# Patient Record
Sex: MALE | Race: WHITE | NOT HISPANIC OR LATINO | Employment: FULL TIME | URBAN - METROPOLITAN AREA
[De-identification: names, ages, dates, MRNs, and addresses within clinical notes are randomized per-mention and may not be internally consistent; named-entity substitution may affect disease eponyms.]

---

## 2017-02-22 DIAGNOSIS — E84.9 CF (CYSTIC FIBROSIS) (H): ICD-10-CM

## 2017-02-22 RX ORDER — LUMACAFTOR AND IVACAFTOR 200; 125 MG/1; MG/1
TABLET, FILM COATED ORAL
Qty: 336 TABLET | Refills: 0 | Status: SHIPPED | OUTPATIENT
Start: 2017-02-22 | End: 2017-04-19

## 2017-03-03 ENCOUNTER — TELEPHONE (OUTPATIENT)
Dept: PULMONOLOGY | Facility: CLINIC | Age: 52
End: 2017-03-03

## 2017-03-03 DIAGNOSIS — E84.9 CF (CYSTIC FIBROSIS) (H): Primary | ICD-10-CM

## 2017-03-03 RX ORDER — CIPROFLOXACIN 750 MG/1
750 TABLET, FILM COATED ORAL 2 TIMES DAILY
Qty: 42 TABLET | Refills: 0 | Status: SHIPPED | OUTPATIENT
Start: 2017-03-03 | End: 2017-03-24

## 2017-03-03 NOTE — TELEPHONE ENCOUNTER
[facility]  March 3, 2017    Dorene Mireles    CF Provider: Siobhan Munoz MD    Caller: Patient     Clinical information:  Michele Altamirano called to report that he will be traveling out of the country for work for the next five of six weeks. Wondering if he can take an antibiotic with in case of illness. Also has questions regarding possible side effects from [employer]. Notes that his blood pressure is elevated ~ 150/110. Also noted that in the afternoon his distance vision seems to be blurred.    Plan:   Discussed with Dr Munoz:  PCP to address elevated blood pressure as well as vision changes.  Call back with any new or worsening symptoms/concerns.    Caller verbalized understanding of plan and agrees with advice given.

## 2017-04-13 DIAGNOSIS — E84.9 CF (CYSTIC FIBROSIS) (H): Primary | ICD-10-CM

## 2017-04-19 ENCOUNTER — OFFICE VISIT (OUTPATIENT)
Dept: PULMONOLOGY | Facility: CLINIC | Age: 52
End: 2017-04-19
Attending: INTERNAL MEDICINE
Payer: COMMERCIAL

## 2017-04-19 ENCOUNTER — HOSPITAL ENCOUNTER (OUTPATIENT)
Dept: PHYSICAL THERAPY | Facility: CLINIC | Age: 52
Setting detail: THERAPIES SERIES
End: 2017-04-19
Attending: INTERNAL MEDICINE
Payer: COMMERCIAL

## 2017-04-19 ENCOUNTER — ALLIED HEALTH/NURSE VISIT (OUTPATIENT)
Dept: CARE COORDINATION | Facility: CLINIC | Age: 52
End: 2017-04-19

## 2017-04-19 DIAGNOSIS — K21.9 GASTROESOPHAGEAL REFLUX DISEASE WITHOUT ESOPHAGITIS: ICD-10-CM

## 2017-04-19 DIAGNOSIS — E84.0 CYSTIC FIBROSIS WITH PULMONARY MANIFESTATIONS (H): ICD-10-CM

## 2017-04-19 DIAGNOSIS — K86.89 PANCREATIC INSUFFICIENCY: ICD-10-CM

## 2017-04-19 DIAGNOSIS — E84.9 CYSTIC FIBROSIS (H): Primary | ICD-10-CM

## 2017-04-19 DIAGNOSIS — E84.9 CYSTIC FIBROSIS (H): ICD-10-CM

## 2017-04-19 DIAGNOSIS — Z13.9 RISK AND FUNCTIONAL ASSESSMENT: Primary | ICD-10-CM

## 2017-04-19 DIAGNOSIS — E55.9 VITAMIN D DEFICIENCY: ICD-10-CM

## 2017-04-19 DIAGNOSIS — E84.9 CF (CYSTIC FIBROSIS) (H): ICD-10-CM

## 2017-04-19 LAB
EXPTIME-PRE: 11.73 SEC
FEF2575-%PRED-PRE: 33 %
FEF2575-PRE: 1.16 L/SEC
FEF2575-PRED: 3.46 L/SEC
FEFMAX-%PRED-PRE: 79 %
FEFMAX-PRE: 7.57 L/SEC
FEFMAX-PRED: 9.56 L/SEC
FEV1-%PRED-PRE: 62 %
FEV1-PRE: 2.38 L
FEV1FEV6-PRE: 67 %
FEV1FEV6-PRED: 80 %
FEV1FVC-PRE: 64 %
FEV1FVC-PRED: 79 %
FIFMAX-PRE: 6.41 L/SEC
FVC-%PRED-PRE: 78 %
FVC-PRE: 3.75 L
FVC-PRED: 4.79 L

## 2017-04-19 PROCEDURE — 94375 RESPIRATORY FLOW VOLUME LOOP: CPT | Mod: ZF | Performed by: INTERNAL MEDICINE

## 2017-04-19 PROCEDURE — 99212 OFFICE O/P EST SF 10 MIN: CPT | Mod: ZF

## 2017-04-19 PROCEDURE — 40000185 ZZHC STATISTIC PT OUTPT VISIT

## 2017-04-19 RX ORDER — SODIUM CHLORIDE FOR INHALATION 7 %
4 VIAL, NEBULIZER (ML) INHALATION 2 TIMES DAILY
Qty: 720 ML | Refills: 3 | Status: SHIPPED | OUTPATIENT
Start: 2017-04-19 | End: 2017-12-22

## 2017-04-19 RX ORDER — AZITHROMYCIN 500 MG/1
500 TABLET, FILM COATED ORAL
Qty: 39 TABLET | Refills: 3 | Status: SHIPPED | OUTPATIENT
Start: 2017-04-19 | End: 2019-03-06

## 2017-04-19 ASSESSMENT — PAIN SCALES - GENERAL: PAINLEVEL: NO PAIN (0)

## 2017-04-19 NOTE — PROGRESS NOTES
[facility]  April 19, 2017         Assessment and Plan:   Michele Altamirano is a 51 year old male with cystic fibrosis.    1. CF lung disease with moderate obstruction: Michele has shown evidence of excellent stability in his pulmonary function as well as his pulmonary symptomatology.  He had stopped doing vest therapy after recent travel abroad and only recently restarted.  He did notice that his hypertonic saline caused his increased headaches.  We did discuss today stopping the hypertonic saline and switching to Pulmozyme 2.5 mg nebulized therapy once daily.  He will consider this as an option.    2.  Cystic fibrosis sinus disease.  Since last being seen, Michele has had sinus surgery.  He says although it helped him breathe better through his sinuses, he continued to have headaches following this.  Presently he has no concerning sinus symptoms.    3.  History of abnormal glucose tolerance.  Michele historically has shown evidence of hyperglycemia.  He currently is not on insulin therapy.  The plan is to follow blood sugars and respond to any symptom change.    4.  Abdominal bloating.  This has been a longstanding problem for Michele, but he did report after decreasing the amount of pancreatic enzymes that he is taking each day that this is much improved.  He reports he only went down by 1 or 2 with meals.  He had no change in stool with this.    5.  Fungal sinusitis.  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  He did his amphotericin nebulized therapy for 3 weeks' time and then discontinued because he did not find it useful.    6.  Psychosocial.  Michele continues to work full-time.  He reports that in general work is going well.  He is doing a fair amount of traveling for work.   7. Pancreatic Insufficiency:  The patient has no new symptoms consistent with worsening malabsorption.  The plan is to continue the present dose of pancreatic enzymes  and vitamin supplementation.    Siobhan Munoz MD MPH   of Medicine  Pulmonary, Allergy, Critical Care and Sleep Medicine      Interval History:     Michele reports that he continues to produce sputum that is yellow in color.  His cough frequency and sputum volume are unchanged.  His activity tolerance remains quite stable.  He tries to neb daily.          Review of Systems:     CONSTITUTIONAL: no fever, no chills, no change in weight, no change in energy, no change in appetite    INTEGUMENTARY/SKIN: no rash, no obvious new lesions    ENT/MOUTH: no sore throat, no new sinus pain, no new nasal drainage     RESPIRATORY: see interval history    CV: no chest pain, no palpitations, no peripheral edema, no orthopnea, no PND    GI: no nausea, no vomiting, no change in stools, no fatty stools, no GERD, no abdominal pain    : no dysuria, no urinary frequency, no incontinence    MUSCULOSKELETAL: no myalgias, no arthralgias    ENDOCRINE: no excessive thirst    NEURO:  continued headache, no numbness, no tingling    SLEEP: no issues    PSYCHIATRIC: mood stable          Past Medical and Surgical History:     Past Medical History:   Diagnosis Date     CAP (community acquired pneumonia)     2011     CF (cystic fibrosis) (H)     diagnosed 1969, malnutrition, staph empyema     Chronic knee pain      GERD (gastroesophageal reflux disease)     egd in the past     Pancreatic insufficiency      Past Surgical History:   Procedure Laterality Date     ENDOSCOPIC SINUS SURGERY      removal of mucocele behind eye, 1992     OPTICAL TRACKING SYSTEM ENDOSCOPIC SINUS SURGERY Bilateral 11/28/2016    Procedure: OPTICAL TRACKING SYSTEM ENDOSCOPIC SINUS SURGERY;  Surgeon: Harriett Cosby MD;  Location:  OR           Family History:     Family History   Problem Relation Age of Onset     Lipids Father      Cardiovascular Father      DIABETES Maternal Grandmother      type 2            Social History:     Social History      Social History     Marital status:      Spouse name: N/A     Number of children: 1     Years of education: N/A     Occupational History           Social History Main Topics     Smoking status: Never Smoker     Smokeless tobacco: Never Used     Alcohol use No     Drug use: No     Sexual activity: Yes     Partners: Female     Birth control/ protection: None     Other Topics Concern     Not on file     Social History Narrative    Nov 2015--Lives in [place] with his wife and 10 yo son.  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            Medications:     Current Outpatient Prescriptions   Medication     sodium chloride (OCEAN) 0.65 % nasal spray     pantoprazole (PROTONIX) 40 MG enteric coated tablet     ranitidine (ZANTAC) 150 MG tablet     albuterol (PROAIR HFA, PROVENTIL HFA, VENTOLIN HFA) 108 (90 BASE) MCG/ACT inhaler     Cholecalciferol (VITAMIN D) 2000 UNITS CAPS     multivitamin CF formula (AQUADEKS) chewable tablet     azithromycin (ZITHROMAX) 500 MG tablet     dornase alpha (PULMOZYME) 1 MG/ML neb solution     sodium chloride inhalant 7 % NEBU neb solution     ZENPEP 87857 UNITS CPEP per EC capsule     lumacaftor-ivacaftor (ORKAMBI) 200-125 MG tablet     No current facility-administered medications for this visit.             Physical Exam:   BP (P) 138/84 (BP Location: Right arm, Patient Position: Chair, Cuff Size: Adult Regular)  Pulse (P) 69  Resp (P) 16  Wt (P) 71.8 kg (158 lb 4.6 oz)  SpO2 (P) 98%  BMI (P) 22.71 kg/m2    Constitutional:   Awake, alert and in no apparent distress     Eyes:   nonicteric     ENT:   oral mucosa moist without lesions, normal tm bilaterally, bilateral mucosal edema     Neck:   Supple without supraclavicular or cervical lymphadenopathy     Lungs:   Good air flow.  No crackles. No rhonchi.  No wheezes.     Cardiovascular:   Normal S1 and S2.  RRR.  No murmur, gallop or rub.     Abdomen:   NABS,  soft, nontender, nondistended.  No HSM.     Musculoskeletal:   No edema, digital clubbing present     Neurologic:   Alert and conversant.     Skin:   Warm, dry.  No rash on limited exam.             Data:   All laboratory and imaging data reviewed.    Cystic Fibrosis Culture  Specimen Description   Date Value Ref Range Status   01/12/2016 Sputum  Final   01/12/2016 Sputum  Final   01/12/2016 Sputum  Final    Culture Micro   Date Value Ref Range Status   01/12/2016 (A)  Final    Normal tessa  Moderate growth Pseudomonas aeruginosa, mucoid strain  Heavy growth Pseudomonas aeruginosa     01/12/2016   Final    Culture negative for acid fast bacilli  Assayed at [facility] [address]     11/04/2015 (A)  Final    Light growth Serratia marcescens  Moderate growth Pseudomonas aeruginosa  Moderate growth Pseudomonas aeruginosa, mucoid strain  Plus normal tessa          Recent Results (from the past 168 hour(s))   General PFT Lab (Please always keep checked)    Collection Time: 04/19/17  7:45 AM   Result Value Ref Range    FVC-Pred 4.79 L    FVC-Pre 3.75 L    FVC-%Pred-Pre 78 %    FEV1-Pre 2.38 L    FEV1-%Pred-Pre 62 %    FEV1FVC-Pred 79 %    FEV1FVC-Pre 64 %    FEFMax-Pred 9.56 L/sec    FEFMax-Pre 7.57 L/sec    FEFMax-%Pred-Pre 79 %    FEF2575-Pred 3.46 L/sec    FEF2575-Pre 1.16 L/sec    KXL9890-%Pred-Pre 33 %    ExpTime-Pre 11.73 sec    FIFMax-Pre 6.41 L/sec    FEV1FEV6-Pred 80 %    FEV1FEV6-Pre 67 %       PFT: Moderate obstructive lung disease.  When compared to , the FEV1 and FVC have 10/18/2017.  The decrease in FVC may represent air trapping v. restrictive physiology.  Lung volumes would be necessary to determine.

## 2017-04-19 NOTE — MR AVS SNAPSHOT
After Visit Summary   4/19/2017    Michele Altamirano    MRN: [id number]           Patient Information     Date Of Birth          1965        Visit Information        Provider Department      4/19/2017  8:00 AM Regina Jose PT [facility]         Follow-ups after your visit        Your next 10 appointments already scheduled     Jul 19, 2017  8:00 AM CDT   CF LOOP with UC PFL CF   [facility] Pulmonary Function Testing ([facility])    [address] [place] [id number] [phone]            Jul 19, 2017  8:30 AM CDT   (Arrive by 8:15 AM)   RETURN CYSTIC FIBROSIS VISIT with Siobhan Munoz MD   [facility] ([facility])    [address] [place] [id number] [phone]              Future tests that were ordered for you today     Open Future Orders        Priority Expected Expires Ordered    Cystic Fibrosis Culture Aerob Bacterial Routine  4/19/2018 4/19/2017            Who to contact     If you have questions or need follow up information about today's clinic visit or your schedule please contact [facility] directly at [phone].  Normal or non-critical lab and imaging results will be communicated to you by MyChart, letter or phone within 4 business days after the clinic has received the results. If you do not hear from us within 7 days, please contact the clinic through [employer] or phone. If you have a critical or abnormal lab result, we will notify you by phone as soon as possible.  Submit refill requests through [employer] or call your pharmacy and they will forward the refill request to us. Please allow 3 business days for your refill to be completed.          Additional Information About Your Visit        [employer] Information     [employer] gives you secure access to  your electronic health record. If you see a primary care provider, you can also send messages to your care team and make appointments. If you have questions, please call your primary care clinic.  If you do not have a primary care provider, please call [phone] and they will assist you.        Care EveryWhere ID     This is your Care EveryWhere ID. This could be used by other organizations to access your [place] medical records  [id number]         Blood Pressure from Last 3 Encounters:   04/19/17 (P) 138/84   12/22/16 146/71   11/28/16 131/85    Weight from Last 3 Encounters:   04/19/17 (P) 71.8 kg (158 lb 4.6 oz)   11/28/16 70.3 kg (155 lb)   10/18/16 73.1 kg (161 lb 2.5 oz)              Today, you had the following     No orders found for display         Today's Medication Changes      Start taking these medicines.        Dose/Directions    dornase alpha 1 MG/ML neb solution   Commonly known as:  PULMOZYME   Used for:  Cystic fibrosis (H)        Dose:  2.5 mg   Inhale 2.5 mg into the lungs daily   Quantity:  75 mL   Refills:  11         These medicines have changed or have updated prescriptions.        Dose/Directions    lumacaftor-ivacaftor 200-125 MG tablet   Commonly known as:  ORKAMBI   This may have changed:  See the new instructions.   Used for:  CF (cystic fibrosis) (H)        TAKE 2 TABLETS BY MOUTH EVERY 12 HOURS WITH FAT CONTAINING FOOD. STOREAT ROOM TEMPERATURE.   Quantity:  336 tablet   Refills:  0            Where to get your medicines      These medications were sent to [facility] - [place], [place] - [address]  [address], [facility] [address]     Phone:  [phone]     azithromycin 500 MG tablet    dornase alpha 1 MG/ML neb solution    sodium chloride inhalant 7 % Nebu neb solution    ZENPEP 44160 UNITS Cpep         Call your pharmacy to confirm that your medication is ready for pickup. It may take up to 24 hours for them to receive the  prescription. If the prescription is not ready within 3 business days, please contact your clinic or your provider.     We will let you know when these medications are ready. If you don't hear back within 3 business days, please contact us.     lumacaftor-ivacaftor 200-125 MG tablet                Primary Care Provider Office Phone # Fax #    Dorene Mireles [phone]       [facility] [address]  [place] [address]        Thank you!     Thank you for choosing [facility] PHYSICAL [facility] OUTPATIENT CYSTIC FIBROSIS CLINIC  for your care. Our goal is always to provide you with excellent care. Hearing back from our patients is one way we can continue to improve our services. Please take a few minutes to complete the written survey that you may receive in the mail after your visit with us. Thank you!             Your Updated Medication List - Protect others around you: Learn how to safely use, store and throw away your medicines at www.disposemymeds.org.      ASK your doctor about these medications        Brand Name Dispense Instructions for use    albuterol 108 (90 BASE) MCG/ACT Inhaler    PROAIR HFA/PROVENTIL HFA/VENTOLIN HFA    3 Inhaler    Inhale 2 puffs into the lungs every 4 hours as needed for shortness of breath / dyspnea or wheezing       azithromycin 500 MG tablet    ZITHROMAX    39 tablet    Take 1 tablet (500 mg) by mouth Every Mon, Wed, Fri Morning       dornase alpha 1 MG/ML neb solution    PULMOZYME    75 mL    Inhale 2.5 mg into the lungs daily       lumacaftor-ivacaftor 200-125 MG tablet    ORKAMBI    336 tablet    TAKE 2 TABLETS BY MOUTH EVERY 12 HOURS WITH FAT CONTAINING FOOD. STOREAT ROOM TEMPERATURE.       multivitamin CF formula chewable tablet     60 tablet    Take 2 tablets by mouth daily       pantoprazole 40 MG EC tablet    PROTONIX    180 tablet    Take 1 tablet (40 mg) by mouth 2 times daily       ranitidine 150 MG tablet    ZANTAC    60 tablet    Take 1 tablet (150 mg)  by mouth 2 times daily       sodium chloride 0.65 % nasal spray    OCEAN    88 mL    Spray 2 sprays into both nostrils every 2 hours (while awake)       sodium chloride inhalant 7 % Nebu neb solution     720 mL    Take 4 mLs by nebulization 2 times daily       vitamin D 2000 UNITS Caps     60 capsule    Take 2 capsules by mouth daily       ZENPEP 07821 UNITS Cpep   Generic drug:  amylase-lipase-protease     3240 capsule    Take 8 with meals and 4 with snacks. (3 meals and 3 snacks per day)

## 2017-04-19 NOTE — LETTER
4/19/2017       RE: Michele Altamirano  [address] [address]     Dear Colleague,    Thank you for referring your patient, Michele Altamirano, to the [facility] at [facility]. Please see a copy of my visit note below.    [facility]  April 19, 2017         Assessment and Plan:   Michele Altamirano is a 51 year old male with cystic fibrosis.    1. CF lung disease with moderate obstruction: Michele has shown evidence of excellent stability in his pulmonary function as well as his pulmonary symptomatology.  He had stopped doing vest therapy after recent travel abroad and only recently restarted.  He did notice that his hypertonic saline caused his increased headaches.  We did discuss today stopping the hypertonic saline and switching to Pulmozyme 2.5 mg nebulized therapy once daily.  He will consider this as an option.    2.  Cystic fibrosis sinus disease.  Since last being seen, Michele has had sinus surgery.  He says although it helped him breathe better through his sinuses, he continued to have headaches following this.  Presently he has no concerning sinus symptoms.    3.  History of abnormal glucose tolerance.  Michele historically has shown evidence of hyperglycemia.  He currently is not on insulin therapy.  The plan is to follow blood sugars and respond to any symptom change.    4.  Abdominal bloating.  This has been a longstanding problem for Michele, but he did report after decreasing the amount of pancreatic enzymes that he is taking each day that this is much improved.  He reports he only went down by 1 or 2 with meals.  He had no change in stool with this.    5.  Fungal sinusitis.  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  He did his amphotericin nebulized therapy for 3 weeks' time and then discontinued because he did not find it useful.    6.  Psychosocial.  Michele continues to  work full-time.  He reports that in general work is going well.  He is doing a fair amount of traveling for work.   7. Pancreatic Insufficiency:  The patient has no new symptoms consistent with worsening malabsorption.  The plan is to continue the present dose of pancreatic enzymes and vitamin supplementation.    Siobhan Munoz MD MPH   of Medicine  Pulmonary, Allergy, Critical Care and Sleep Medicine      Interval History:     Michele reports that he continues to produce sputum that is yellow in color.  His cough frequency and sputum volume are unchanged.  His activity tolerance remains quite stable.  He tries to neb daily.          Review of Systems:     CONSTITUTIONAL: no fever, no chills, no change in weight, no change in energy, no change in appetite    INTEGUMENTARY/SKIN: no rash, no obvious new lesions    ENT/MOUTH: no sore throat, no new sinus pain, no new nasal drainage     RESPIRATORY: see interval history    CV: no chest pain, no palpitations, no peripheral edema, no orthopnea, no PND    GI: no nausea, no vomiting, no change in stools, no fatty stools, no GERD, no abdominal pain    : no dysuria, no urinary frequency, no incontinence    MUSCULOSKELETAL: no myalgias, no arthralgias    ENDOCRINE: no excessive thirst    NEURO:  continued headache, no numbness, no tingling    SLEEP: no issues    PSYCHIATRIC: mood stable          Past Medical and Surgical History:     Past Medical History:   Diagnosis Date     CAP (community acquired pneumonia)     2011     CF (cystic fibrosis) (H)     diagnosed 1969, malnutrition, staph empyema     Chronic knee pain      GERD (gastroesophageal reflux disease)     egd in the past     Pancreatic insufficiency      Past Surgical History:   Procedure Laterality Date     ENDOSCOPIC SINUS SURGERY      removal of mucocele behind eye, 1992     OPTICAL TRACKING SYSTEM ENDOSCOPIC SINUS SURGERY Bilateral 11/28/2016    Procedure: OPTICAL TRACKING SYSTEM ENDOSCOPIC  SINUS SURGERY;  Surgeon: Harriett Cosby MD;  Location:  OR           Family History:     Family History   Problem Relation Age of Onset     Lipids Father      Cardiovascular Father      DIABETES Maternal Grandmother      type 2            Social History:     Social History     Social History     Marital status:      Spouse name: N/A     Number of children: 1     Years of education: N/A     Occupational History           Social History Main Topics     Smoking status: Never Smoker     Smokeless tobacco: Never Used     Alcohol use No     Drug use: No     Sexual activity: Yes     Partners: Female     Birth control/ protection: None     Other Topics Concern     Not on file     Social History Narrative    Nov 2015--Lives in [place] with his wife and 10 yo son.  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            Medications:     Current Outpatient Prescriptions   Medication     sodium chloride (OCEAN) 0.65 % nasal spray     pantoprazole (PROTONIX) 40 MG enteric coated tablet     ranitidine (ZANTAC) 150 MG tablet     albuterol (PROAIR HFA, PROVENTIL HFA, VENTOLIN HFA) 108 (90 BASE) MCG/ACT inhaler     Cholecalciferol (VITAMIN D) 2000 UNITS CAPS     multivitamin CF formula (AQUADEKS) chewable tablet     azithromycin (ZITHROMAX) 500 MG tablet     dornase alpha (PULMOZYME) 1 MG/ML neb solution     sodium chloride inhalant 7 % NEBU neb solution     ZENPEP 96656 UNITS CPEP per EC capsule     lumacaftor-ivacaftor (ORKAMBI) 200-125 MG tablet     No current facility-administered medications for this visit.             Physical Exam:   BP (P) 138/84 (BP Location: Right arm, Patient Position: Chair, Cuff Size: Adult Regular)  Pulse (P) 69  Resp (P) 16  Wt (P) 71.8 kg (158 lb 4.6 oz)  SpO2 (P) 98%  BMI (P) 22.71 kg/m2    Constitutional:   Awake, alert and in no apparent distress     Eyes:   nonicteric     ENT:   oral mucosa moist without lesions,  normal tm bilaterally, bilateral mucosal edema     Neck:   Supple without supraclavicular or cervical lymphadenopathy     Lungs:   Good air flow.  No crackles. No rhonchi.  No wheezes.     Cardiovascular:   Normal S1 and S2.  RRR.  No murmur, gallop or rub.     Abdomen:   NABS, soft, nontender, nondistended.  No HSM.     Musculoskeletal:   No edema, digital clubbing present     Neurologic:   Alert and conversant.     Skin:   Warm, dry.  No rash on limited exam.             Data:   All laboratory and imaging data reviewed.    Cystic Fibrosis Culture  Specimen Description   Date Value Ref Range Status   01/12/2016 Sputum  Final   01/12/2016 Sputum  Final   01/12/2016 Sputum  Final    Culture Micro   Date Value Ref Range Status   01/12/2016 (A)  Final    Normal tessa  Moderate growth Pseudomonas aeruginosa, mucoid strain  Heavy growth Pseudomonas aeruginosa     01/12/2016   Final    Culture negative for acid fast bacilli  Assayed at [email or web address] [address]     11/04/2015 (A)  Final    Light growth Serratia marcescens  Moderate growth Pseudomonas aeruginosa  Moderate growth Pseudomonas aeruginosa, mucoid strain  Plus normal tessa          Recent Results (from the past 168 hour(s))   General PFT Lab (Please always keep checked)    Collection Time: 04/19/17  7:45 AM   Result Value Ref Range    FVC-Pred 4.79 L    FVC-Pre 3.75 L    FVC-%Pred-Pre 78 %    FEV1-Pre 2.38 L    FEV1-%Pred-Pre 62 %    FEV1FVC-Pred 79 %    FEV1FVC-Pre 64 %    FEFMax-Pred 9.56 L/sec    FEFMax-Pre 7.57 L/sec    FEFMax-%Pred-Pre 79 %    FEF2575-Pred 3.46 L/sec    FEF2575-Pre 1.16 L/sec    MVQ7607-%Pred-Pre 33 %    ExpTime-Pre 11.73 sec    FIFMax-Pre 6.41 L/sec    FEV1FEV6-Pred 80 %    FEV1FEV6-Pre 67 %       PFT: Moderate obstructive lung disease.  When compared to , the FEV1 and FVC have 10/18/2017.  The decrease in FVC may represent air trapping v. restrictive physiology.  Lung volumes would be necessary to  determine.      Again, thank you for allowing me to participate in the care of your patient.      Sincerely,    Siobhan Munoz MD

## 2017-04-19 NOTE — PATIENT INSTRUCTIONS
Cystic Fibrosis Self-Care Plan       MRN: [id number]   Clinic Date: April 19, 2017   Patient: Michele Altamirano     Annual Studies:   IGG   Date Value Ref Range Status   09/24/2015  695 - 1620 mg/dL Final    Canceled, Test credited   Canceled by station  As requested by nurse 1020,9/24/2015.  MR       Insulin   Date Value Ref Range Status   09/24/2015 29 mU/L Final     Comment:     Reference Range:  0-20     There are no preventive care reminders to display for this patient.      Pulmonary Function Tests  FEV1: amount of air you can blow out in 1 second  FVC: total amount of air you can take in and blow out    Your Goals:         PFT Latest Ref Rng & Units 4/19/2017   FVC L 3.75   FEV1 L 2.38   FVC% % 78   FEV1% % 62          Airway Clearance: The Most Important Way to Keep Your Lungs Healthy  Vest Settings:    Hill-Rom Frequencies: 8, 9, 10 Pressure 10 Then, Frequencies 18, 19, 20 Pressure 6      RespirTech: Quick Start with Pressure of     Do each frequency for 5 minutes; Deflate vest after each frequency & cough 3 times before beginning the next setting.    Vest and Neb Therapy should be done 1-2 times/day.    Good Nutrition Can Improve Lung Function and Overall Health     Take ALL of your vitamins with food     Take 1/2 of your enzymes before EVERY meal/snack and the other 1/2 mid-meal/snack    Wt Readings from Last 3 Encounters:   04/19/17 (P) 71.8 kg (158 lb 4.6 oz)   11/28/16 70.3 kg (155 lb)   10/18/16 73.1 kg (161 lb 2.5 oz)       Body mass index is 22.71 kg/(m^2) (pended).         National CF Foundation Recommendations for BMI in CF Adults: Women: at least 22 Men: at least 23        Controlling Blood Sugars Helps Prevent Lung Infections & Improves Nutrition  Test blood sugar:     In the morning before eating (goal is )     2 hours after a meal (goal is less than 150)     When pre-meal glucose is greater than 150 add correction     At bedtime (if less than 100 eat a snack with 15 grams of  carbohydrates  Last A1C Results:   Hemoglobin A1C   Date Value Ref Range Status   10/18/2016 6.3 (H) 4.3 - 6.0 % Final         If diabetic, measure A1C every 6 months. Goal: Under 7%    Staying Healthy    Research:  If you are interested in learning about research opportunities or have questions, please contact Jessenia Dalton at [phone] or [email or web address].      CF Foundation:  Compass is a personalized resource service to help you with the insurance, financial, legal and other issues you are facing.  It's free, confidential and available to anyone with CF.  Ask your  for more information or contact Compass directly at 092-COMPASS ([phone]) or compass@cff.org, or learn more at cff.org/compass.          RECOMMENDATIONS: Pulmozyme once daily.  Hold the hypertonic saline if doing the pulmozyme.  Will renew your scripts.    YOUR GOAL:  Enjoy the spring!      CF Nurse Line:  Ramirez Marlow: [phone]   Alyse Cheek, RT: [phone]     Willa Romero: [phone] or Alondra Vitale, Dieticians: [phone]   Priscila Norman, Diabetes Nurse: [phone]      Angela Guerrero: [phone] or Ayala Andino [phone], Social Workers   [email or web address]

## 2017-04-19 NOTE — NURSING NOTE
Chief Complaint   Patient presents with     Cystic Fibrosis     Patient is being seen for CF follow up      Lexi Damon CMA at 8:12 AM on 4/19/2017

## 2017-04-19 NOTE — MR AVS SNAPSHOT
After Visit Summary   4/19/2017    Michele Altamirano    MRN: [id number]           Patient Information     Date Of Birth          1965        Visit Information        Provider Department      4/19/2017 7:50 AM Siobhan Munoz MD [facility]        Today's Diagnoses     Cystic fibrosis (H)    -  1      Care Instructions    Cystic Fibrosis Self-Care Plan       MRN: [id number]   Clinic Date: April 19, 2017   Patient: Michele Altamirano     Annual Studies:   IGG   Date Value Ref Range Status   09/24/2015  695 - 1620 mg/dL Final    Canceled, Test credited   Canceled by station  As requested by nurse 1020,9/24/2015.  MR       Insulin   Date Value Ref Range Status   09/24/2015 29 mU/L Final     Comment:     Reference Range:  0-20     There are no preventive care reminders to display for this patient.      Pulmonary Function Tests  FEV1: amount of air you can blow out in 1 second  FVC: total amount of air you can take in and blow out    Your Goals:         PFT Latest Ref Rng & Units 4/19/2017   FVC L 3.75   FEV1 L 2.38   FVC% % 78   FEV1% % 62          Airway Clearance: The Most Important Way to Keep Your Lungs Healthy  Vest Settings:    Hill-Rom Frequencies: 8, 9, 10 Pressure 10 Then, Frequencies 18, 19, 20 Pressure 6      RespirTech: Quick Start with Pressure of     Do each frequency for 5 minutes; Deflate vest after each frequency & cough 3 times before beginning the next setting.    Vest and Neb Therapy should be done 1-2 times/day.    Good Nutrition Can Improve Lung Function and Overall Health     Take ALL of your vitamins with food     Take 1/2 of your enzymes before EVERY meal/snack and the other 1/2 mid-meal/snack    Wt Readings from Last 3 Encounters:   04/19/17 (P) 71.8 kg (158 lb 4.6 oz)   11/28/16 70.3 kg (155 lb)   10/18/16 73.1 kg (161 lb 2.5 oz)       Body mass index is 22.71 kg/(m^2) (pended).         National CF Foundation Recommendations for BMI in CF  Adults: Women: at least 22 Men: at least 23        Controlling Blood Sugars Helps Prevent Lung Infections & Improves Nutrition  Test blood sugar:     In the morning before eating (goal is )     2 hours after a meal (goal is less than 150)     When pre-meal glucose is greater than 150 add correction     At bedtime (if less than 100 eat a snack with 15 grams of carbohydrates  Last A1C Results:   Hemoglobin A1C   Date Value Ref Range Status   10/18/2016 6.3 (H) 4.3 - 6.0 % Final         If diabetic, measure A1C every 6 months. Goal: Under 7%    Staying Healthy    Research:  If you are interested in learning about research opportunities or have questions, please contact Jessenia Dalton at [phone] or [email or web address].      CF Foundation:  Compass is a personalized resource service to help you with the insurance, financial, legal and other issues you are facing.  It's free, confidential and available to anyone with CF.  Ask your  for more information or contact Compass directly at 131-[facility] (001-8624) or compass@cff.org, or learn more at cff.org/compass.          RECOMMENDATIONS: Pulmozyme once daily.  Hold the hypertonic saline if doing the pulmozyme.  Will renew your scripts.    YOUR GOAL:  Enjoy the spring!      CF Nurse Line:  Ramirez Marlow: [phone]   Alyse Cheek, RT: [phone]     Willa Romero: [phone] or Alondra Vitale, Dieticians: [phone]   Priscila Normna, Diabetes Nurse: [phone]      Angela Guerrero: [phone] or Ayala Andino [phone], Social Workers   [email or web address]                 Follow-ups after your visit        Follow-up notes from your care team     Return in about 3 months (around 7/19/2017).      Your next 10 appointments already scheduled     Jul 19, 2017  8:00 AM CDT   CF LOOP with [facility] PFL [facility] Pulmonary Function Testing ([facility])    [address]  [id number] [phone]            Jul 19, 2017  8:30 AM CDT   (Arrive by 8:15 AM)   RETURN CYSTIC FIBROSIS VISIT with Siobhan Munoz MD   [facility] ([facility])    [address] [facility]  [address] [facility] [id number] [phone]              Future tests that were ordered for you today     Open Future Orders        Priority Expected Expires Ordered    Cystic Fibrosis Culture Aerob Bacterial Routine  4/19/2018 4/19/2017            Who to contact     If you have questions or need follow up information about today's clinic visit or your schedule please contact [facility] directly at [phone].  Normal or non-critical lab and imaging results will be communicated to you by MyChart, letter or phone within 4 business days after the clinic has received the results. If you do not hear from us within 7 days, please contact the clinic through Bufferhart or phone. If you have a critical or abnormal lab result, we will notify you by phone as soon as possible.  Submit refill requests through [email or web address] or call your pharmacy and they will forward the refill request to us. Please allow 3 business days for your refill to be completed.          Additional Information About Your Visit        [email or web address] Information     [email or web address] gives you secure access to your electronic health record. If you see a primary care provider, you can also send messages to your care team and make appointments. If you have questions, please call your primary care clinic.  If you do not have a primary care provider, please call [phone] and they will assist you.        Care EveryWhere ID     This is your Care EveryWhere ID. This could be used by other organizations to access your [facility] medical records  [id number]         Blood Pressure from Last 3 Encounters:   04/19/17 (P) 138/84   12/22/16 146/71   11/28/16 131/85    Weight from Last 3  Encounters:   04/19/17 (P) 71.8 kg (158 lb 4.6 oz)   11/28/16 70.3 kg (155 lb)   10/18/16 73.1 kg (161 lb 2.5 oz)              We Performed the Following     RESPIRATORY FLOW VOLUME LOOP          Today's Medication Changes          These changes are accurate as of: 4/19/17 10:31 AM.  If you have any questions, ask your nurse or doctor.               Start taking these medicines.        Dose/Directions    dornase alpha 1 MG/ML neb solution   Commonly known as:  PULMOZYME   Used for:  Cystic fibrosis (H)   Started by:  Lora La RN        Dose:  2.5 mg   Inhale 2.5 mg into the lungs daily   Quantity:  75 mL   Refills:  11         These medicines have changed or have updated prescriptions.        Dose/Directions    albuterol 108 (90 BASE) MCG/ACT Inhaler   Commonly known as:  PROAIR HFA/PROVENTIL HFA/VENTOLIN HFA   This may have changed:    - when to take this  - additional instructions   Used for:  CF (cystic fibrosis) (H), Bronchiectasis without acute exacerbation (H)        Dose:  2 puff   Inhale 2 puffs into the lungs every 4 hours as needed for shortness of breath / dyspnea or wheezing   Quantity:  3 Inhaler   Refills:  3       lumacaftor-ivacaftor 200-125 MG tablet   Commonly known as:  ORKAMBI   This may have changed:  See the new instructions.   Used for:  CF (cystic fibrosis) (H)   Changed by:  Lora La, RN        TAKE 2 TABLETS BY MOUTH EVERY 12 HOURS WITH FAT CONTAINING FOOD. STOREAT ROOM TEMPERATURE.   Quantity:  336 tablet   Refills:  0            Where to get your medicines      These medications were sent to [facility] - [place], [place] - [address]  [address], [facility] [address]     Phone:  [phone]     azithromycin 500 MG tablet    dornase alpha 1 MG/ML neb solution    sodium chloride inhalant 7 % Nebu neb solution    ZENPEP 58816 UNITS Cpep         Call your pharmacy to confirm that your medication is ready for pickup. It may take up to 24  hours for them to receive the prescription. If the prescription is not ready within 3 business days, please contact your clinic or your provider.     We will let you know when these medications are ready. If you don't hear back within 3 business days, please contact us.     lumacaftor-ivacaftor 200-125 MG tablet                Primary Care Provider Office Phone # Fax #    Dorene Mireles [phone]       [facility] [address] [facility] [address]        Thank you!     Thank you for choosing [facility]  for your care. Our goal is always to provide you with excellent care. Hearing back from our patients is one way we can continue to improve our services. Please take a few minutes to complete the written survey that you may receive in the mail after your visit with us. Thank you!             Your Updated Medication List - Protect others around you: Learn how to safely use, store and throw away your medicines at www.disposemymeds.org.          This list is accurate as of: 4/19/17 10:31 AM.  Always use your most recent med list.                   Brand Name Dispense Instructions for use    albuterol 108 (90 BASE) MCG/ACT Inhaler    PROAIR HFA/PROVENTIL HFA/VENTOLIN HFA    3 Inhaler    Inhale 2 puffs into the lungs every 4 hours as needed for shortness of breath / dyspnea or wheezing       azithromycin 500 MG tablet    ZITHROMAX    39 tablet    Take 1 tablet (500 mg) by mouth Every Mon, Wed, Fri Morning       dornase alpha 1 MG/ML neb solution    PULMOZYME    75 mL    Inhale 2.5 mg into the lungs daily       lumacaftor-ivacaftor 200-125 MG tablet    ORKAMBI    336 tablet    TAKE 2 TABLETS BY MOUTH EVERY 12 HOURS WITH FAT CONTAINING FOOD. STOREAT ROOM TEMPERATURE.       multivitamin CF formula chewable tablet     60 tablet    Take 2 tablets by mouth daily       pantoprazole 40 MG EC tablet    PROTONIX    180 tablet    Take 1 tablet (40 mg) by mouth 2 times daily        ranitidine 150 MG tablet    ZANTAC    60 tablet    Take 1 tablet (150 mg) by mouth 2 times daily       sodium chloride 0.65 % nasal spray    OCEAN    88 mL    Spray 2 sprays into both nostrils every 2 hours (while awake)       sodium chloride inhalant 7 % Nebu neb solution     720 mL    Take 4 mLs by nebulization 2 times daily       vitamin D 2000 UNITS Caps     60 capsule    Take 2 capsules by mouth daily       ZENPEP 95383 UNITS Cpep   Generic drug:  amylase-lipase-protease     3240 capsule    Take 8 with meals and 4 with snacks. (3 meals and 3 snacks per day)

## 2017-04-19 NOTE — MR AVS SNAPSHOT
After Visit Summary   4/19/2017    Michele Altamirano    MRN: [id number]           Patient Information     Date Of Birth          1965        Visit Information        Provider Department      4/19/2017 11:21 AM Angela Guerrero MSW  CASE MANAGEMENT        Today's Diagnoses     Risk and functional assessment    -  1       Follow-ups after your visit        Your next 10 appointments already scheduled     Jul 19, 2017  8:00 AM CDT   CF LOOP with UC PFL CF   [facility] Pulmonary Function Testing ([place] [facility])    [address] [place] [id number] [phone]            Jul 19, 2017  8:30 AM CDT   (Arrive by 8:15 AM)   RETURN CYSTIC FIBROSIS VISIT with Siobhan Munoz MD   [facility] ([place] [facility])    [address] [place] [id number] [phone]              Future tests that were ordered for you today     Open Future Orders        Priority Expected Expires Ordered    Cystic Fibrosis Culture Aerob Bacterial Routine  4/19/2018 4/19/2017            Who to contact     If you have questions or need follow up information about today's clinic visit or your schedule please contact  CASE MANAGEMENT directly at [phone].  Normal or non-critical lab and imaging results will be communicated to you by [employer].hart, letter or phone within 4 business days after the clinic has received the results. If you do not hear from us within 7 days, please contact the clinic through [employer].hart or phone. If you have a critical or abnormal lab result, we will notify you by phone as soon as possible.  Submit refill requests through Handmark or call your pharmacy and they will forward the refill request to us. Please allow 3 business days for your refill to be completed.          Additional Information About Your Visit        [employer] Information     Handmark gives you secure access to your  electronic health record. If you see a primary care provider, you can also send messages to your care team and make appointments. If you have questions, please call your primary care clinic.  If you do not have a primary care provider, please call [phone] and they will assist you.        Care EveryWhere ID     This is your Care EveryWhere ID. This could be used by other organizations to access your [facility] medical records  [id number]         Blood Pressure from Last 3 Encounters:   04/19/17 (P) 138/84   12/22/16 146/71   11/28/16 131/85    Weight from Last 3 Encounters:   04/19/17 (P) 71.8 kg (158 lb 4.6 oz)   11/28/16 70.3 kg (155 lb)   10/18/16 73.1 kg (161 lb 2.5 oz)              Today, you had the following     No orders found for display         Today's Medication Changes          These changes are accurate as of: 4/19/17 11:59 PM.  If you have any questions, ask your nurse or doctor.               Start taking these medicines.        Dose/Directions    dornase alpha 1 MG/ML neb solution   Commonly known as:  PULMOZYME   Used for:  Cystic fibrosis (H)   Started by:  Lora La RN        Dose:  2.5 mg   Inhale 2.5 mg into the lungs daily   Quantity:  75 mL   Refills:  11         These medicines have changed or have updated prescriptions.        Dose/Directions    albuterol 108 (90 BASE) MCG/ACT Inhaler   Commonly known as:  PROAIR HFA/PROVENTIL HFA/VENTOLIN HFA   This may have changed:    - when to take this  - additional instructions   Used for:  CF (cystic fibrosis) (H), Bronchiectasis without acute exacerbation (H)        Dose:  2 puff   Inhale 2 puffs into the lungs every 4 hours as needed for shortness of breath / dyspnea or wheezing   Quantity:  3 Inhaler   Refills:  3       lumacaftor-ivacaftor 200-125 MG tablet   Commonly known as:  ORKAMBI   This may have changed:  See the new instructions.   Used for:  CF (cystic fibrosis) (H)   Changed by:  Lora La, RN        TAKE 2 TABLETS BY  MOUTH EVERY 12 HOURS WITH FAT CONTAINING FOOD. STOREAT ROOM TEMPERATURE.   Quantity:  336 tablet   Refills:  0            Where to get your medicines      These medications were sent to [facility] - [place], [place] - [address]  [address], [facility] [address]     Phone:  [phone]     azithromycin 500 MG tablet    dornase alpha 1 MG/ML neb solution    lumacaftor-ivacaftor 200-125 MG tablet    sodium chloride inhalant 7 % Nebu neb solution    ZENPEP 50195 UNITS Cpep                Primary Care Provider Office Phone # Fax #    Dorene Mireles [phone]       [facility] [address]  [facility] [address]        Thank you!     Thank you for choosing [facility] CASE MANAGEMENT  for your care. Our goal is always to provide you with excellent care. Hearing back from our patients is one way we can continue to improve our services. Please take a few minutes to complete the written survey that you may receive in the mail after your visit with us. Thank you!             Your Updated Medication List - Protect others around you: Learn how to safely use, store and throw away your medicines at www.disposemymeds.org.          This list is accurate as of: 4/19/17 11:59 PM.  Always use your most recent med list.                   Brand Name Dispense Instructions for use    albuterol 108 (90 BASE) MCG/ACT Inhaler    PROAIR HFA/PROVENTIL HFA/VENTOLIN HFA    3 Inhaler    Inhale 2 puffs into the lungs every 4 hours as needed for shortness of breath / dyspnea or wheezing       azithromycin 500 MG tablet    ZITHROMAX    39 tablet    Take 1 tablet (500 mg) by mouth Every Mon, Wed, Fri Morning       dornase alpha 1 MG/ML neb solution    PULMOZYME    75 mL    Inhale 2.5 mg into the lungs daily       lumacaftor-ivacaftor 200-125 MG tablet    ORKAMBI    336 tablet    TAKE 2 TABLETS BY MOUTH EVERY 12 HOURS WITH FAT CONTAINING FOOD. STOREAT ROOM TEMPERATURE.       multivitamin CF formula  chewable tablet     60 tablet    Take 2 tablets by mouth daily       pantoprazole 40 MG EC tablet    PROTONIX    180 tablet    Take 1 tablet (40 mg) by mouth 2 times daily       ranitidine 150 MG tablet    ZANTAC    60 tablet    Take 1 tablet (150 mg) by mouth 2 times daily       sodium chloride 0.65 % nasal spray    OCEAN    88 mL    Spray 2 sprays into both nostrils every 2 hours (while awake)       sodium chloride inhalant 7 % Nebu neb solution     720 mL    Take 4 mLs by nebulization 2 times daily       vitamin D 2000 UNITS Caps     60 capsule    Take 2 capsules by mouth daily       ZENPEP 33894 UNITS Cpep   Generic drug:  amylase-lipase-protease     3240 capsule    Take 8 with meals and 4 with snacks. (3 meals and 3 snacks per day)

## 2017-04-20 ASSESSMENT — ANXIETY QUESTIONNAIRES
5. BEING SO RESTLESS THAT IT IS HARD TO SIT STILL: NOT AT ALL
2. NOT BEING ABLE TO STOP OR CONTROL WORRYING: NOT AT ALL
3. WORRYING TOO MUCH ABOUT DIFFERENT THINGS: NOT AT ALL
1. FEELING NERVOUS, ANXIOUS, OR ON EDGE: NOT AT ALL
7. FEELING AFRAID AS IF SOMETHING AWFUL MIGHT HAPPEN: NOT AT ALL
GAD7 TOTAL SCORE: 0
IF YOU CHECKED OFF ANY PROBLEMS ON THIS QUESTIONNAIRE, HOW DIFFICULT HAVE THESE PROBLEMS MADE IT FOR YOU TO DO YOUR WORK, TAKE CARE OF THINGS AT HOME, OR GET ALONG WITH OTHER PEOPLE: NOT DIFFICULT AT ALL
6. BECOMING EASILY ANNOYED OR IRRITABLE: NOT AT ALL

## 2017-04-20 ASSESSMENT — PATIENT HEALTH QUESTIONNAIRE - PHQ9: 5. POOR APPETITE OR OVEREATING: NOT AT ALL

## 2017-04-20 NOTE — PROGRESS NOTES
Adult Cystic Fibrosis Program  Annual Psychosocial Assessment    Presenting Information:  Michele is a 51-year-old male with cystic fibrosis, presenting in CF clinic for a regular follow up with primary CF provider, Dr. uMnoz.  Met with Michele to complete an updated annual psychosocial assessment.     Living situation:  Michele lives with his wife Sherie and son Raghu in [place], [place].  They own their own home.  Michele and his family have one cat, Onur. He denies any concerns about his living situation.      Family Constellation:  Michele was raised by his biological parents and is the youngest of 4 brothers, all of whom are  with children. Michele noted previously that none of his brothers are CF-carriers. Michele's parents live in [place], [place], where he grew up. Two of his brothers remain in [place], and the other lives in [place]. His mother and brothers are doing well, but they would like his father to undergo evaluation for possible Alzheimer's symptoms.   Michele and his wife met in college and have been  for 19 years. Michele and his wife have one 12-year-old son, Raghu, whom they adopted from [place] when he was 3 years old. They would have considered adopting another son from [place] but were unable to proceed due to bureaucratic issues. His wife's parents live in [place].      Social Support:  Michele reports good social support.  He gets along well with family members and draws additional support from friends and co-workers.  He has never had any connections in the CF Community.    Adjustment to Illness:  Michele was diagnosed with CF at age 4. He knows that he was very ill prior to his diagnosis but has no memory of that time. Once he was diagnosed and received therapy, his health stabilized, and he had very few complications. He recalls his parents being diligent with treatments but also not following some of the conventions through allowing him to be active in sports and not limiting fat in his diet. Michele noted that  "most people in high school had no idea he had CF. He has never been admitted to the hospital for CF. He had received care through the [facility] but transferred care to [facility] in 2011.   Michele describes his current health status as \"good\" and feels he has been able to maintain stability with his health. Michele reports that he was asymptomatic until about 7 years ago. He reports vesting \"semi  regularly\", stating he gets a lot of nontraditional therapy from exercise. Michele exercises regularly, especially through activities with his son. He belongs to the [employer] and goes regularly, though noted he recently broke two of his toes so has not been exercising as much in recent weeks.  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 He also goes running and biking.  Michele did share he has restless leg syndrome, that sometimes keeps him up at night. His brother has a more severe case and is taking medication.  Michele has decided he does not want to take medication for this at this point.  Michele continues to take Orkambi.  He denies any physical health symptoms that interfere with daily activities.     Health Care Directive:  Michele has previously received Health Care Directive education.  This SW reviewed education, including concept/purpose of health care directive, default health care agents and how to complete a directive.  Michele is comfortable with his default health care agent in absence of a directive (wife).  Michele and his wife have had conversations regarding Michele's health care preferences, hopes and wishes.      Education:  Michele completed two Bachelor's degrees in business and mechanical engineering.    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 He enjoys his job and reports a supportive work place. He has access to employer-based benefits, including health insurance, short and long-term disability, however he is currently under his wife's health " "insurance. He and his wife evaluate their insurance policies each year to determine which makes the most sense.  He denies any employment concerns at this time.     Michele's spouse is an  and works full-time for a publishing firm. She works from home full-time. She is also benefits-eligible.     Finances:  Michele receives income from he and his wife's wages and denies any financial concerns.  Today, Michele had questions about insurance options for when he retires. Provided him with education the current options, but informed him these could likely change. Also, discussed SSDI.     Insurance:  Michele is insured through his wife's employer. He denies any current concerns.    Mental Health/Coping:  Michele denies any current or past symptoms indicative of mood, anxiety, eating, learning or other mental health disorder. He identifies himself as a \"pretty happy go henry hunter\".  He has never been under the care of a mental health provider.  He generally cheryl with stressors through enjoying time with his family, exercising and engaging in his hobbies. Michele did note he has difficulty sleeping but attributes this to staying up late and working on his hobbies.     Michele did complete the mental health screenings today:  LUIS DANIEL-7 Score: 0, indicating no symptoms of anxiety.   PHQ-9 Score: 3, indicating no symptoms of depression.      Chemical Health:  Michele denies tobacco or illicit drug use.  He occasionally drinks alcohol during social events at work.     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  He also reports having many hobbies/interests such as photography, brewing beer and restoring old cars.     Intervention:  -Psychosocial Assessment  -Mental health screening   -Health care directive education review  -Insurance/SSDI education     Assessment:  Michele appeared to be open in his responses. His psychosocial situation has remained largely the same since his last " clinic visit. Michele seems to be psychosocially stable overall, with access to relevant resources and supports.  No concerns expressed/noted. Recommend f/u as noted in the plan below.     Plan:  - Address psychosocial concerns as they may arise.   - Complete psychosocial assessment annually.    ANA Klein  Social Work for Adult Cystic Fibrosis   Phone: [phone]  Pager: [phone]  Fax: [phone]

## 2017-04-21 ENCOUNTER — TELEPHONE (OUTPATIENT)
Dept: PULMONOLOGY | Facility: CLINIC | Age: 52
End: 2017-04-21

## 2017-04-21 ASSESSMENT — PATIENT HEALTH QUESTIONNAIRE - PHQ9: SUM OF ALL RESPONSES TO PHQ QUESTIONS 1-9: 3

## 2017-04-21 ASSESSMENT — ANXIETY QUESTIONNAIRES: GAD7 TOTAL SCORE: 0

## 2017-04-21 NOTE — TELEPHONE ENCOUNTER
Received fax from insurance stating Orkambi was denied due to patient also currently taking Kalydeco. Spoke with rep at [facility], she stated an ePA was sent to insurance from this clinic, and the questions were filled out incorrectly and therefore they sent a denial letter. She did also state that because there is already a PA approval on file, that PA will be honored for life. She stated that the denial letter received can be disregarded.    Madina Villalobos [facility]  Pharmacy Liaison [facility] CF clinic  Phone: [phone] Pager: [phone]  [email or web address]

## 2017-05-12 ENCOUNTER — TELEPHONE (OUTPATIENT)
Dept: PULMONOLOGY | Facility: CLINIC | Age: 52
End: 2017-05-12

## 2017-05-12 NOTE — TELEPHONE ENCOUNTER
PA Initiation    Medication: Pulmozyme 2.5mg  Insurance Company: [employer] - Phone [phone] Fax [phone]  Pharmacy Filling the Rx: [facility] - [place], [place] - [address]  Filling Pharmacy Phone: [phone]  Filling Pharmacy Fax: [phone]  Start Date: 5/12/2017    Received PA form from insurance, completed and faxed back.

## 2017-05-15 NOTE — TELEPHONE ENCOUNTER
Prior Authorization Approval    Authorization Effective Date: 5/15/2017  Authorization Expiration Date: 5/15/2019  Medication: Pulmozyme 2.5mg (APPROVED)  Approved Dose/Quantity: 75 per 28 days  Reference #:   [id number]  Insurance Company: [employer] - Phone [phone] Fax [phone]  Expected CoPay:       CoPay Card Available:      Foundation Assistance Needed:    Which Pharmacy is filling the prescription (Not needed for infusion/clinic administered): [facility] - [place], [place] - [address] [place] [address]  Pharmacy Notified:  yes  Patient Notified:  yes   Completed PA over phone, once approval letter is received will scan in.

## 2017-07-05 NOTE — PROGRESS NOTES
04/19/17 1000   Quick Adds   Type of Visit (CF screen only)   General Information   Referring Physician Siobhan Munoz MD   Orders Evaluate and Treat as Indicated   Order Date 04/13/17   Medical Diagnosis Cystic Fibrosis   Surgical/Medical history reviewed Yes   Prior level of function comment PT: Pt is I with all mobility. Pt reports he bikes to work 2x/week, usually runs 2x/week but about 5 weeks ago brok his toe so has had to hold on running. 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 Pt states he will return to running in a few weeks when toe more healed.   Patient role/Employment history Employed   Living environment [facility]   General Information Comments PT: Pt agreeable to PT CF screen. Pt reports no incontinence of urine. Educated pt on PT's ability to provide treatment and education to improve incontinence and lessen the effects on daily activities and mobility. Pt verbalized understanding of available treatment and declined the need to address this with PT at this time.   Fall Risk Screen   Fall screen completed by PT   Per patient - Fall 2 or more times in past year? No   Per patient - Fall with injury in past year? No   Is patient a fall risk? No   Pain   Patient currently in pain No   Cognitive Status Examination   Orientation orientation to person, place and time   Level of Consciousness alert   Follows Commands and Answers Questions 100% of the time   Personal Safety and Judgment intact   Memory intact   Posture   Posture Comments PT: Pt demonstrates fair posture with mild shoulder protraction and no scapular winging. Pt's thoracic spine is in straight alignment with no natural lumbar lordotic curve, and cervical spine is in straight alignment again with no natural lordotic curve.   Range of Motion (ROM)   ROM Comment PT: Pt able to demonstrate 165 deg shoulder forward flexion while still maintaining neutral spinal positioning, and  with no pain. Pt does demonstrate compensatory upper thoracic kyphosis and cervical ext beginning at 150 of shoulder flexion. Pt achieves forward bend with finger tips 14cm from floor.   Strength   Strength Comments PT: Pt able to demonstrate a plank and maintain position for 4:34 minutes:seconds. Pt demonstrates mildly protracted scapular positioning, neutral spinal alignment and neutral hip alignment. With fatigue, pt began to demonstrate change in scapular and hip positioning moving into inc protraction and hip ext, at 3:30 minutes:seconds.  Pt demonstrates 3 sit ups in hooklying position. Pt initially achieves 80% of full range of motion, hands positioned behind head. Pt initiates the sit up utilizing abdominals and some spinal flexion.   Clinical Impression   Criteria for Skilled Therapeutic Interventions Met patient/family refuse skilled intervention at this time  (Screen only, pt declined addressing posture with PT.)   Patient, Family & other staff in agreement with plan of care Yes

## 2017-08-04 DIAGNOSIS — E84.9 CF (CYSTIC FIBROSIS) (H): ICD-10-CM

## 2017-08-07 DIAGNOSIS — E84.9 CF (CYSTIC FIBROSIS) (H): ICD-10-CM

## 2017-08-23 DIAGNOSIS — E55.9 VITAMIN D DEFICIENCY: ICD-10-CM

## 2017-08-23 DIAGNOSIS — K86.89 PANCREATIC INSUFFICIENCY: ICD-10-CM

## 2017-08-23 DIAGNOSIS — E84.0 CYSTIC FIBROSIS WITH PULMONARY MANIFESTATIONS (H): ICD-10-CM

## 2017-08-23 DIAGNOSIS — K21.9 GASTROESOPHAGEAL REFLUX DISEASE WITHOUT ESOPHAGITIS: ICD-10-CM

## 2017-08-23 RX ORDER — PANTOPRAZOLE SODIUM 40 MG/1
40 TABLET, DELAYED RELEASE ORAL 2 TIMES DAILY
Qty: 180 TABLET | Refills: 3 | Status: SHIPPED | OUTPATIENT
Start: 2017-08-23 | End: 2018-08-15

## 2017-11-06 DIAGNOSIS — E55.9 VITAMIN D DEFICIENCY: ICD-10-CM

## 2017-11-06 DIAGNOSIS — K21.9 GASTROESOPHAGEAL REFLUX DISEASE WITHOUT ESOPHAGITIS: ICD-10-CM

## 2017-11-06 DIAGNOSIS — E84.0 CYSTIC FIBROSIS WITH PULMONARY MANIFESTATIONS (H): ICD-10-CM

## 2017-11-06 DIAGNOSIS — K86.89 PANCREATIC INSUFFICIENCY: ICD-10-CM

## 2017-12-08 DIAGNOSIS — E84.0 CYSTIC FIBROSIS OF THE LUNG (H): ICD-10-CM

## 2017-12-22 ENCOUNTER — OFFICE VISIT (OUTPATIENT)
Dept: PULMONOLOGY | Facility: CLINIC | Age: 52
End: 2017-12-22
Attending: INTERNAL MEDICINE
Payer: COMMERCIAL

## 2017-12-22 VITALS
HEART RATE: 59 BPM | BODY MASS INDEX: 23.44 KG/M2 | HEIGHT: 69 IN | WEIGHT: 158.29 LBS | OXYGEN SATURATION: 98 % | RESPIRATION RATE: 18 BRPM | SYSTOLIC BLOOD PRESSURE: 155 MMHG | DIASTOLIC BLOOD PRESSURE: 88 MMHG

## 2017-12-22 DIAGNOSIS — Z13.1 SCREENING FOR DIABETES MELLITUS (DM): ICD-10-CM

## 2017-12-22 DIAGNOSIS — E84.9 CYSTIC FIBROSIS (H): ICD-10-CM

## 2017-12-22 DIAGNOSIS — E84.9 CF (CYSTIC FIBROSIS) (H): Primary | ICD-10-CM

## 2017-12-22 DIAGNOSIS — E55.9 VITAMIN D DEFICIENCY: ICD-10-CM

## 2017-12-22 DIAGNOSIS — K21.9 GASTROESOPHAGEAL REFLUX DISEASE WITHOUT ESOPHAGITIS: ICD-10-CM

## 2017-12-22 DIAGNOSIS — E84.9 CYSTIC FIBROSIS (H): Primary | ICD-10-CM

## 2017-12-22 DIAGNOSIS — Z13.220 SCREENING FOR HYPERLIPIDEMIA: ICD-10-CM

## 2017-12-22 DIAGNOSIS — R73.09 ABNORMAL GLUCOSE TOLERANCE TEST: ICD-10-CM

## 2017-12-22 DIAGNOSIS — Z13.220 LIPID SCREENING: ICD-10-CM

## 2017-12-22 DIAGNOSIS — K86.89 PANCREATIC INSUFFICIENCY: ICD-10-CM

## 2017-12-22 DIAGNOSIS — E84.0 CYSTIC FIBROSIS WITH PULMONARY MANIFESTATIONS (H): ICD-10-CM

## 2017-12-22 LAB
EXPTIME-PRE: 9.51 SEC
FEF2575-%PRED-PRE: 36 %
FEF2575-PRE: 1.25 L/SEC
FEF2575-PRED: 3.42 L/SEC
FEFMAX-%PRED-PRE: 80 %
FEFMAX-PRE: 7.63 L/SEC
FEFMAX-PRED: 9.52 L/SEC
FEV1-%PRED-PRE: 62 %
FEV1-PRE: 2.37 L
FEV1FEV6-PRE: 68 %
FEV1FEV6-PRED: 80 %
FEV1FVC-PRE: 64 %
FEV1FVC-PRED: 79 %
FIFMAX-PRE: 6.72 L/SEC
FVC-%PRED-PRE: 77 %
FVC-PRE: 3.68 L
FVC-PRED: 4.77 L

## 2017-12-22 PROCEDURE — 87186 SC STD MICRODIL/AGAR DIL: CPT | Performed by: INTERNAL MEDICINE

## 2017-12-22 PROCEDURE — 97803 MED NUTRITION INDIV SUBSEQ: CPT | Mod: ZF | Performed by: DIETITIAN, REGISTERED

## 2017-12-22 PROCEDURE — 87070 CULTURE OTHR SPECIMN AEROBIC: CPT | Performed by: INTERNAL MEDICINE

## 2017-12-22 PROCEDURE — 87077 CULTURE AEROBIC IDENTIFY: CPT | Performed by: INTERNAL MEDICINE

## 2017-12-22 PROCEDURE — 99212 OFFICE O/P EST SF 10 MIN: CPT | Mod: ZF

## 2017-12-22 RX ORDER — SODIUM CHLORIDE FOR INHALATION 7 %
4 VIAL, NEBULIZER (ML) INHALATION DAILY
Qty: 720 ML | Refills: 3 | COMMUNITY
Start: 2017-12-22 | End: 2019-03-06

## 2017-12-22 RX ORDER — OSELTAMIVIR PHOSPHATE 75 MG/1
75 CAPSULE ORAL 2 TIMES DAILY
Qty: 10 CAPSULE | Refills: 1 | Status: SHIPPED | OUTPATIENT
Start: 2017-12-22 | End: 2018-10-24

## 2017-12-22 RX ORDER — NEBULIZER
1 EACH MISCELLANEOUS 3 TIMES DAILY
Qty: 1 EACH | Refills: 5 | Status: SHIPPED | OUTPATIENT
Start: 2017-12-22 | End: 2022-03-25

## 2017-12-22 RX ORDER — NEBULIZER
1 EACH MISCELLANEOUS 3 TIMES DAILY
Qty: 1 EACH | Refills: 1 | Status: SHIPPED | OUTPATIENT
Start: 2017-12-22 | End: 2022-03-17

## 2017-12-22 RX ORDER — ALBUTEROL SULFATE 0.83 MG/ML
2.5 SOLUTION RESPIRATORY (INHALATION) 2 TIMES DAILY
Qty: 180 ML | Refills: 11 | Status: SHIPPED | OUTPATIENT
Start: 2017-12-22 | End: 2018-02-09

## 2017-12-22 ASSESSMENT — PAIN SCALES - GENERAL: PAINLEVEL: NO PAIN (0)

## 2017-12-22 NOTE — LETTER
12/22/2017       RE: Michele Altamirano  [address] [facility] [address]     Dear Colleague,    Thank you for referring your patient, Michele Altamirano, to the [facility] FOR LUNG SCIENCE AND HEALTH at [facility]. Please see a copy of my visit note below.    Reason for Visit  Michele Altamirano is a 52 year old year old male who is being seen for Cystic Fibrosis (Patient is being seen for CF follow up)    Assessment and plan:   Michele Altamirano is a 52-year-old male with cystic fibrosis with moderate obstructive lung disease, pancreatic insufficiency, CF related sinus disease of the history of fungal sinusitis and glucose intolerance.    Pulmonary:The patient reports very good exercise tolerance. He is oxygenating well. PFTs show moderate obstruction. They seem to very visit to visit but are below his recent best. He is feeling well and does not appear to be having an exacerbation. He does not do any formal airway clearance. I suspect his PFTs and chest congestion could be better if he were to do regular airway clearance. I recommended a trial of twice daily vest therapy with coughing and huffing between each setting. I've recommended albuterol with every therapy, hypertonic saline neb with his morning therapy and Pulmozyme with this evening therapy. He reports that he has had difficulty with MARYBETH in the past. I suggested a trial of [place] with his next visit which could be used every other month. His enthusiasm for formal airway clearance therapy will likely depend on his response to one month of consistent therapy. Continue three-time weekly azithromycin.    Pancreatic insufficiency: Patient denies symptoms of malabsorption. His weight has been variable over the past few years but is currently in an adequate range with a BMI of 23.4. He will continue his current vitamins and enzymes.    Abnormal glucose tolerance: The patient has a history of hyperglycemia. He is not receiving insulin at this time. Glucose  tolerance testing will be pursued with his next visit.    Cystic fibrosis sinus disease/fungal sinusitis:  Quiescent at this time.    Abdominal bloating:  Adequately controlled with current pancreatic enzymes, proton pump inhibitor and H2 blocker.    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 He is tolerating Orkambi well.    Healthcare maintenance: The patient received an influenza vaccine through his local clinic for this flu season. Annual studies are overdue and will be scheduled with his next visit including glucose tolerance testing and bone density scan.     Patient will be seen in follow-up in one month with full annual studies.    50 minutes face to face time with the patient, more than half spent in counseling and coordination of care regarding Options for reducing chest congestion and maintaining stable lung function.  CF HPI  The patient was seen and examined by Ziyad Meyer   Michele Altamirano is a 52-year-old male with cystic fibrosis with moderate obstructive lung disease, pancreatic insufficiency, glucose intolerance and history of fungal sinusitis. He has been well since his last visit. Breathing is comfortable at rest and with all usual activity. He runs twice weekly for about 25 minutes covering about 2 miles. He also does yoga regularly. He reports minimal sputum production which is variable in color and volume but generally a small amount of yellow. He denies hemoptysis. Sputum production is decreased compared with 20 years ago but he does feel congested at times. Sometimes he's able to clear his secretions other times he feels they are stuck in his chest. He denies chest pain. He does not do any vest therapy. He uses his exercise her airway clearance. He has not used Pulmozyme in the recent past although it is on his medication list.    Review of Systems:  Appetite is good.  No ear pain, sore throat, sinus pain or rhinorrhea.  Abdominal fullness after meals. No nausea, vomiting or  diarrhea.  Chronic muscular pain of the neck and shoulders.  A complete ROS was otherwise negative except as noted in the HPI.    Current Outpatient Prescriptions   Medication     oseltamivir (TAMIFLU) 75 MG capsule     sodium chloride inhalant 7 % NEBU neb solution     ZENPEP 71312 UNITS CPEP per EC capsule     albuterol (2.5 MG/3ML) 0.083% neb solution     pantoprazole (PROTONIX) 40 MG EC tablet     lumacaftor-ivacaftor (ORKAMBI) 200-125 MG tablet     azithromycin (ZITHROMAX) 500 MG tablet     dornase alpha (PULMOZYME) 1 MG/ML neb solution     sodium chloride (OCEAN) 0.65 % nasal spray     ranitidine (ZANTAC) 150 MG tablet     albuterol (PROAIR HFA, PROVENTIL HFA, VENTOLIN HFA) 108 (90 BASE) MCG/ACT inhaler     Cholecalciferol (VITAMIN D) 2000 UNITS CAPS     multivitamin CF formula (AQUADEKS) chewable tablet     [DISCONTINUED] sodium chloride inhalant 7 % NEBU neb solution     No current facility-administered medications for this visit.      No Known Allergies  Past Medical History:   Diagnosis Date     CAP (community acquired pneumonia)     2011     CF (cystic fibrosis) (H)     diagnosed 1969, malnutrition, staph empyema     Chronic knee pain      GERD (gastroesophageal reflux disease)     egd in the past     Pancreatic insufficiency        Past Surgical History:   Procedure Laterality Date     ENDOSCOPIC SINUS SURGERY      removal of mucocele behind eye, 1992     OPTICAL TRACKING SYSTEM ENDOSCOPIC SINUS SURGERY Bilateral 11/28/2016    Procedure: OPTICAL TRACKING SYSTEM ENDOSCOPIC SINUS SURGERY;  Surgeon: Harriett Cosby MD;  Location:  OR       Social History     Social History     Marital status:      Spouse name: N/A     Number of children: 1     Years of education: N/A     Occupational History           Social History Main Topics     Smoking status: Never Smoker     Smokeless tobacco: Never Used     Alcohol use No     Drug use: No     Sexual activity: Yes     Partners: Female     Birth  "control/ protection: None     Other Topics Concern     Not on file     Social History Narrative    Nov 2015--Lives in [place] with his wife and 10 yo son.  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     /88  Pulse 59  Resp 18  Ht 1.75 m (5' 8.9\")  Wt 71.8 kg (158 lb 4.6 oz)  SpO2 98%  BMI 23.44 kg/m2  Body mass index is 23.44 kg/(m^2).  Exam:   GENERAL APPEARANCE: Well developed, well nourished, alert, and in no apparent distress.  EYES: PERRL, EOMI  HENT: Nasal mucosa with edema and no hyperemia. No nasal polyps.  EARS: Canals clear, TMs normal  MOUTH: Oral mucosa is moist, without any lesions, no tonsillar enlargement, no oropharyngeal exudate.  NECK: supple, no masses, no thyromegaly.  LYMPHATICS: No significant axillary, cervical, or supraclavicular nodes.  RESP: normal percussion, good air flow throughout.  Bilat upper lung crackles. No rhonchi. No wheezes.  CV: Normal S1, S2, regular rhythm, normal rate. No murmur.  No rub. No gallop. No LE edema.   ABDOMEN:  Bowel sounds normal, soft, nontender, no HSM or masses.   MS: extremities normal. No clubbing. No cyanosis.  SKIN: no rash on limited exam  NEURO: Mentation intact, speech normal, normal strength and tone, normal gait and stance  PSYCH: mentation appears normal. and affect normal/bright  Results:  Recent Results (from the past 168 hour(s))   General PFT Lab (Please always keep checked)    Collection Time: 12/22/17  8:38 AM   Result Value Ref Range    FVC-Pred 4.77 L    FVC-Pre 3.68 L    FVC-%Pred-Pre 77 %    FEV1-Pre 2.37 L    FEV1-%Pred-Pre 62 %    FEV1FVC-Pred 79 %    FEV1FVC-Pre 64 %    FEFMax-Pred 9.52 L/sec    FEFMax-Pre 7.63 L/sec    FEFMax-%Pred-Pre 80 %    FEF2575-Pred 3.42 L/sec    FEF2575-Pre 1.25 L/sec    LJY7315-%Pred-Pre 36 %    ExpTime-Pre 9.51 sec    FIFMax-Pre 6.72 L/sec    FEV1FEV6-Pred 80 %    FEV1FEV6-Pre 68 %                                   Results as noted " above.    PFT Interpretation:  Moderate obstructive ventilatory defect.  Unchanged from previous.   Below recent best.   Valid Maneuver    CF Exacerbation  absent      CF Annual Nutrition Assessment    Reason for Assessment  Assessed during Dr. Meyer clinic r/t increased nutrition risk with diagnosis of CF per protocol.    Nutrition Significant PMH  Moderate Lung Disease  Pancreatic Insufficient   GERD  Abnormal OGTT    Social Assessment  Living situation: Lives with his wife and son in a house in [place], [place].   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    Food Insecurity:  None    Anthropometric Assessment  Height: 175 cm  IBW based on BMI 22 for females and 23 for males per CF Foundation recs: 70.7 kg  Today's Weight: 71.8 kg (actual weight)   %IBW: 102%  Body mass index is 23.44 kg/(m^2).   Current weight is considered: Normal BMI and at CF goal    Wt Readings from Last 10 Encounters:   17 71.8 kg (158 lb 4.6 oz)   17 (P) 71.8 kg (158 lb 4.6 oz)   16 70.3 kg (155 lb)   10/18/16 73.1 kg (161 lb 2.5 oz)   16 72.7 kg (160 lb 3.2 oz)   16 74 kg (163 lb 2.3 oz)   16 72.4 kg (159 lb 9.8 oz)   11/20/15 71.3 kg (157 lb 1.6 oz)   09/29/15 70.8 kg (156 lb 1.4 oz)   06/02/15 71 kg (156 lb 8.4 oz)       Weight Status:  Stable, at CF goal.  Pt happy with weight and doesn't feel the need to focus on it too much since he's stable.     Physical Activity/Exercise:  Not discussed this visit.    MALNUTRITION  Patient does not meet 2 of the criteria necessary for diagnosing malnutrition.    Pancreatic Enzymes  Brand:  [email or web address]  Dosin with meals, 2-3 with snacks  (~22 per day on average)    High dose providing 1671 units lipase/kg/meal which is within the recommended range per CF Foundation to inhibit fibrosing colonopathy.    Are you taking enzymes as recommended: Yes (takes most before, some during)  Signs of Malabsorption:  No  Enzyme Program:  [id number], very  "active, loves this program and using [employer] for extra nutrition items    Diet History and Assessment  Diet Preferences/Allergies/Intolerances:  Regular, tries for moderate carb intake. Hx of suspected delayed gastric emptying and sometimes alters his diet/reduces portions due to early satiety/feeling of food getting \"stuck\" in his stomach.      Intake Recall/Comments:  Eats three meals per day and drives for a high calorie but healthy/balanced diet.  Very interested in the recommended proportions of macronutrients for all meals, particularly from a diabetes standpoint.  Tends to eat breakfast meals that are somewhat high in sugar/simple CHO sources and tends to get post-prandial hypoglycemia mid-morning unless he has a snack around 10:30.  This has improved a bit since he added peanut butter to his breakfast for a source of protein + fat (ex bkfst: white toast with peanut butter, Scandishake made with whole milk, juice or coffee).   In the past when he has seen an endocrinologist they have recommended a consistent carbohydrate diet, he tries to stick to this by eating mostly the same meals around the same times everyday.  Of note, he currently does not see an endocrinologist but would consider a referral pending the results of his next OGTT.     Calcium: Drinks whole milk plus Scandishakes, likely 3-4 servings/day      -One packet Scandishake + whole milk = ~400 mg calcium  Salt: Not discussed this visit  Hydration:  Good/stable, >60 ounces per day  Supplements:  Yes, once Scandishake daily in the AM. Receives these from [employer] program.     Estimated Energy and Protein Needs  Estimation based on weight maintenance with Moderate lung disease and pancreatic insufficient.    BEE: ~1600  8955-1321 kcals/day 200-225% BEE  105-145 g protein/day 1.5-2 g/kg    Laboratory Assessment    Vitamin A   Date Value Ref Range Status   03/27/2012 0.45  Final     Comment:     Reference range: 0.30 to 1.20  Unit: mg/L     Vitamin " D Deficiency screening   Date Value Ref Range Status   09/24/2015  20 - 75 ug/L Final    Canceled, Test credited  Credit per order location       Vitamin E   Date Value Ref Range Status   03/27/2012 11.4  Final     Comment:     Reference range: 5.5 to 18.0  Unit: mg/L     Iron   Date Value Ref Range Status   04/12/2016 146 35 - 180 ug/dL Final     Cholesterol   Date Value Ref Range Status   09/24/2015 160 <200 mg/dL Final     Comment:     LDL Cholesterol is the primary guide to therapy.   The NCEP recommends further evaluation of: patients with cholesterol greater   than 200 mg/dL if additional risk factors are present, cholesterol greater   than   240 mg/dL, triglycerides greater than 150 mg/dL, or HDL less than 40 mg/dL.       Triglycerides   Date Value Ref Range Status   09/24/2015 64 0 - 150 mg/dL Final     HDL Cholesterol   Date Value Ref Range Status   09/24/2015 70 >40 mg/dL Final     LDL Cholesterol Calculated   Date Value Ref Range Status   09/24/2015 77 0 - 129 mg/dL Final     Comment:     LDL Cholesterol is the primary guide to therapy: LDL-cholesterol goal in high   risk patients is <100 mg/dL and in very high risk patients is <70 mg/dL.       VLDL-Cholesterol   Date Value Ref Range Status   09/24/2015 13 0 - 30 mg/dL Final     Cholesterol/HDL Ratio   Date Value Ref Range Status   09/24/2015 2.3 0.0 - 5.0 Final     Annual studies overdue, pt plans to complete them next visit (in 6 weeks).    Current Vitamin/Mineral Supplements: MVW Complete 1 softgel BID, Vitamin D International Units and Vitamin E 400 International Units BID    Comments:  Pt receives all of his supplements at no cost from the [employer] program.       NUTRITION DIAGNOSIS  Impaired nutrient utilization r/t CF lung disease and CF-related pancreatic insufficiency AEB requires pancreatic enzyme replacement therapy, high dose vitamin/mineral supplementation and high kcal/pro diet to maintain nutrition and health  status.    INTERVENTIONS/RECOMMENDATIONS  1) Endocrine:  Discussed recommended oral intake for management of blood sugars including reducing intake of simple sugars and choosing more sources of whole grains/complex CHO in replacement.  Recommended pt add protein, fat, and fiber to meals to prevent post-prandial hypoglycemia and avoiding high intakes of simple CHO in one sitting.  Reviewed guidelines for correcting low blood sugar and encouraged pt to have a source of >15 grams sugar with him at all times (juice, glucose tablets, etc), particularly those times he is prone to having hypoglycemia.        -Pt will repeat an OGTT with next annual studies, may recommend Endocrine referral at that time pending results.     2) Pt overdue for annual studies including a DEXA scan.  He will complete these tests in 6 weeks at his next follow-up appointment.  Provided education on annual studies tests and procedures including vitamin levels and bone scan.  Reviewed vitamin/mineral regimen 1:1 with pt and discussed that we may decide to recommend changes pending the results of his annual labs. Will follow.      Patient Understanding: Good  Expected Compliance: Good  Follow-Up Plans: Per protocol    GOALS:  1) Weight maintenance to BMI of 23 kg/m2 or above.   2) 100% compliance with prescribed enzymes, vitamin/mineral supplements.    FOLLOW-UP/MONITORING:  Visit patient within 12 month(s) for annual follow-up, review annual studies once complete and adjust nutrition plan of care accordingly.     Time Spent In Face-to-Face Patient Interactions:  15 minutes      Alondra Vitale MS, RD, LD (pager [phone])  Cystic Fibrosis/Lung Transplant Dietitian      -Available Tues-Fri 8 AM-4:30 PM. On Mondays please contact pager [phone] (Willa Romero RD) and on weekends/holidays contact coverage dietitian at pager [phone] (inpatient use only).     Again, thank you for allowing me to participate in the care of your patient.       Sincerely,    Ziyad Meyer MD

## 2017-12-22 NOTE — PROGRESS NOTES
Reason for Visit  Michele Altamirano is a 52 year old year old male who is being seen for Cystic Fibrosis (Patient is being seen for CF follow up)    Assessment and plan:   Michele Altamirano is a 52-year-old male with cystic fibrosis with moderate obstructive lung disease, pancreatic insufficiency, CF related sinus disease of the history of fungal sinusitis and glucose intolerance.    Pulmonary:The patient reports very good exercise tolerance. He is oxygenating well. PFTs show moderate obstruction. They seem to very visit to visit but are below his recent best. He is feeling well and does not appear to be having an exacerbation. He does not do any formal airway clearance. I suspect his PFTs and chest congestion could be better if he were to do regular airway clearance. I recommended a trial of twice daily vest therapy with coughing and huffing between each setting. I've recommended albuterol with every therapy, hypertonic saline neb with his morning therapy and Pulmozyme with this evening therapy. He reports that he has had difficulty with MARYBETH in the past. I suggested a trial of [place] with his next visit which could be used every other month. His enthusiasm for formal airway clearance therapy will likely depend on his response to one month of consistent therapy. Continue three-time weekly azithromycin.    Pancreatic insufficiency: Patient denies symptoms of malabsorption. His weight has been variable over the past few years but is currently in an adequate range with a BMI of 23.4. He will continue his current vitamins and enzymes.    Abnormal glucose tolerance: The patient has a history of hyperglycemia. He is not receiving insulin at this time. Glucose tolerance testing will be pursued with his next visit.    Cystic fibrosis sinus disease/fungal sinusitis:  Quiescent at this time.    Abdominal bloating:  Adequately controlled with current pancreatic enzymes, proton pump inhibitor and H2 blocker.    CFTR modulation: The patient  is a [id number] homozygote. He is tolerating Orkambi well.    Healthcare maintenance: The patient received an influenza vaccine through his local clinic for this flu season. Annual studies are overdue and will be scheduled with his next visit including glucose tolerance testing and bone density scan.     Patient will be seen in follow-up in one month with full annual studies.    50 minutes face to face time with the patient, more than half spent in counseling and coordination of care regarding Options for reducing chest congestion and maintaining stable lung function.  CF HPI  The patient was seen and examined by Ziyad Meyer   Michele Altamirano is a 52-year-old male with cystic fibrosis with moderate obstructive lung disease, pancreatic insufficiency, glucose intolerance and history of fungal sinusitis. He has been well since his last visit. Breathing is comfortable at rest and with all usual activity. He runs twice weekly for about 25 minutes covering about 2 miles. He also does yoga regularly. He reports minimal sputum production which is variable in color and volume but generally a small amount of yellow. He denies hemoptysis. Sputum production is decreased compared with 20 years ago but he does feel congested at times. Sometimes he's able to clear his secretions other times he feels they are stuck in his chest. He denies chest pain. He does not do any vest therapy. He uses his exercise her airway clearance. He has not used Pulmozyme in the recent past although it is on his medication list.    Review of Systems:  Appetite is good.  No ear pain, sore throat, sinus pain or rhinorrhea.  Abdominal fullness after meals. No nausea, vomiting or diarrhea.  Chronic muscular pain of the neck and shoulders.  A complete ROS was otherwise negative except as noted in the HPI.    Current Outpatient Prescriptions   Medication     oseltamivir (TAMIFLU) 75 MG capsule     sodium chloride inhalant 7 % NEBU neb solution     ZENPEP  60296 UNITS CPEP per EC capsule     albuterol (2.5 MG/3ML) 0.083% neb solution     pantoprazole (PROTONIX) 40 MG EC tablet     lumacaftor-ivacaftor (ORKAMBI) 200-125 MG tablet     azithromycin (ZITHROMAX) 500 MG tablet     dornase alpha (PULMOZYME) 1 MG/ML neb solution     sodium chloride (OCEAN) 0.65 % nasal spray     ranitidine (ZANTAC) 150 MG tablet     albuterol (PROAIR HFA, PROVENTIL HFA, VENTOLIN HFA) 108 (90 BASE) MCG/ACT inhaler     Cholecalciferol (VITAMIN D) 2000 UNITS CAPS     multivitamin CF formula (AQUADEKS) chewable tablet     [DISCONTINUED] sodium chloride inhalant 7 % NEBU neb solution     No current facility-administered medications for this visit.      No Known Allergies  Past Medical History:   Diagnosis Date     CAP (community acquired pneumonia)     2011     CF (cystic fibrosis) (H)     diagnosed 1969, malnutrition, staph empyema     Chronic knee pain      GERD (gastroesophageal reflux disease)     egd in the past     Pancreatic insufficiency        Past Surgical History:   Procedure Laterality Date     ENDOSCOPIC SINUS SURGERY      removal of mucocele behind eye, 1992     OPTICAL TRACKING SYSTEM ENDOSCOPIC SINUS SURGERY Bilateral 11/28/2016    Procedure: OPTICAL TRACKING SYSTEM ENDOSCOPIC SINUS SURGERY;  Surgeon: Harriett Cosby MD;  Location:  OR       Social History     Social History     Marital status:      Spouse name: N/A     Number of children: 1     Years of education: N/A     Occupational History           Social History Main Topics     Smoking status: Never Smoker     Smokeless tobacco: Never Used     Alcohol use No     Drug use: No     Sexual activity: Yes     Partners: Female     Birth control/ protection: None     Other Topics Concern     Not on file     Social History Narrative    Nov 2015--Lives in [place] with his wife and 10 yo son.  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         /88   "Pulse 59  Resp 18  Ht 1.75 m (5' 8.9\")  Wt 71.8 kg (158 lb 4.6 oz)  SpO2 98%  BMI 23.44 kg/m2  Body mass index is 23.44 kg/(m^2).  Exam:   GENERAL APPEARANCE: Well developed, well nourished, alert, and in no apparent distress.  EYES: PERRL, EOMI  HENT: Nasal mucosa with edema and no hyperemia. No nasal polyps.  EARS: Canals clear, TMs normal  MOUTH: Oral mucosa is moist, without any lesions, no tonsillar enlargement, no oropharyngeal exudate.  NECK: supple, no masses, no thyromegaly.  LYMPHATICS: No significant axillary, cervical, or supraclavicular nodes.  RESP: normal percussion, good air flow throughout.  Bilat upper lung crackles. No rhonchi. No wheezes.  CV: Normal S1, S2, regular rhythm, normal rate. No murmur.  No rub. No gallop. No LE edema.   ABDOMEN:  Bowel sounds normal, soft, nontender, no HSM or masses.   MS: extremities normal. No clubbing. No cyanosis.  SKIN: no rash on limited exam  NEURO: Mentation intact, speech normal, normal strength and tone, normal gait and stance  PSYCH: mentation appears normal. and affect normal/bright  Results:  Recent Results (from the past 168 hour(s))   General PFT Lab (Please always keep checked)    Collection Time: 12/22/17  8:38 AM   Result Value Ref Range    FVC-Pred 4.77 L    FVC-Pre 3.68 L    FVC-%Pred-Pre 77 %    FEV1-Pre 2.37 L    FEV1-%Pred-Pre 62 %    FEV1FVC-Pred 79 %    FEV1FVC-Pre 64 %    FEFMax-Pred 9.52 L/sec    FEFMax-Pre 7.63 L/sec    FEFMax-%Pred-Pre 80 %    FEF2575-Pred 3.42 L/sec    FEF2575-Pre 1.25 L/sec    VSF7624-%Pred-Pre 36 %    ExpTime-Pre 9.51 sec    FIFMax-Pre 6.72 L/sec    FEV1FEV6-Pred 80 %    FEV1FEV6-Pre 68 %                                   Results as noted above.    PFT Interpretation:  Moderate obstructive ventilatory defect.  Unchanged from previous.   Below recent best.   Valid Maneuver             CF Exacerbation  absent        "

## 2017-12-22 NOTE — MR AVS SNAPSHOT
After Visit Summary   12/22/2017    Michele Altamirano    MRN: [id number]           Patient Information     Date Of Birth          1965        Visit Information        Provider Department      12/22/2017 8:40 AM Ziyad Meyer MD [facility]        Today's Diagnoses     CF (cystic fibrosis) (H)    -  1    Cystic fibrosis with pulmonary manifestations (H)        Gastroesophageal reflux disease without esophagitis        Pancreatic insufficiency        Vitamin D deficiency        Abnormal glucose tolerance test        Screening for diabetes mellitus (DM)        Screening for hyperlipidemia        Lipid screening          Care Instructions    Cystic Fibrosis Self-Care Plan    RECOMMENDATIONS:   Vest therapy twice daily for 30 minutes with coughing and huffing every 5 minute.  Albuterol nebs first with every therapy.  Hypertonic saline with morning therapy.  Pulmozyme with evening therapy.  Otherwise continue current medication.      Signs and Symptoms of the flu are temperature > 100.5, body aches, increased cough,( nausea, diarrhea, headache). Start Tamiflu within 48 hours of onset of symptoms and please notify the CF office.    [facility]:[phone]. [place] prescription has been sent here. Please call the CF office ([phone]) once you receive it to schedule a test dose to be done in clinic. Bring Albuterol MDI along with nebulizer and [place] when doing the test dose.    YOUR GOAL:Enjoy the holidays!      CF Nurse line:          Ramirez Marlow   [phone]            CF Resp Therapist: Alyse Cheek            [phone]   CF Dietitians:           Willa Romero            [phone]                       Alondra Vitale                       [phone]   CF Diabetes Nurse: Livia Mondragon             [phone]    CF Social Workers:  Kaykay Vieyra             [phone]                Ayala Andino            [phone]  CF Pharmacist:         Jael Diamondt                              [phone]  [email or web address]         MRN: [id number]   Clinic Date: December 22, 2017   Patient: Michele Altamirano     Annual Studies:   IGG   Date Value Ref Range Status   09/24/2015  695 - 1620 mg/dL Final    Canceled, Test credited   Canceled by station  As requested by nurse 1020,9/24/2015.  MR       Insulin   Date Value Ref Range Status   09/24/2015 29 mU/L Final     Comment:     Reference Range:  0-20     There are no preventive care reminders to display for this patient.      Pulmonary Function Tests  FEV1: amount of air you can blow out in 1 second  FVC: total amount of air you can take in and blow out    Your Goals:         PFT Latest Ref Rng & Units 12/22/2017   FVC L 3.68   FEV1 L 2.37   FVC% % 77   FEV1% % 62          Airway Clearance: The Most Important Way to Keep Your Lungs Healthy  Vest Settings:    Hill-Rom Frequencies: 8, 9, 10 Pressure 10 Then, Frequencies 18, 19, 20 Pressure 6      RespirTech: Quick Start with Pressure of     Do each frequency for 5 minutes; Deflate vest after each frequency & cough 3 times before beginning the next setting.    Vest and Neb Therapy should be done 2 times/day.    Good Nutrition Can Improve Lung Function and Overall Health     Take ALL of your vitamins with food     Take 1/2 of your enzymes before EVERY meal/snack and the other 1/2 mid-meal/snack    Wt Readings from Last 3 Encounters:   04/19/17 (P) 71.8 kg (158 lb 4.6 oz)   11/28/16 70.3 kg (155 lb)   10/18/16 73.1 kg (161 lb 2.5 oz)       There is no height or weight on file to calculate BMI.         National CF Foundation Recommendations for BMI in CF Adults: Women: at least 22 Men: at least 23        Controlling Blood Sugars Helps Prevent Lung Infections & Improves Nutrition  Test blood sugar:     In the morning before eating (goal is )     2 hours after a meal (goal is less than 150)     When pre-meal glucose is greater than 150 add correction     At  bedtime (if less than 100 eat a snack with 15 grams of carbohydrates  Last A1C Results:   Hemoglobin A1C   Date Value Ref Range Status   10/18/2016 6.3 (H) 4.3 - 6.0 % Final         If diabetic, measure A1C every 6 months. Goal: Under 7%    Staying Healthy    Research:  If you are interested in learning about research opportunities or have questions, please contact Jessenia Dalton at [phone] or [email or web address].       Foundation:  [employer] is a personalized resource service to help you with the insurance, financial, legal and other issues you are facing.  It's free, confidential and available to anyone with CF.  Ask your  for more information or contact [employer] directly at 709-COMPASS ([phone]) or compass@cff.org, or learn more at cff.org/compass.                             Follow-ups after your visit        Follow-up notes from your care team     Return in about 6 weeks (around 2/2/2018).      Your next 10 appointments already scheduled     Feb 09, 2018  8:00 AM CST   Infusion 180 with UC SPEC INFUSION, UC 51 ATC   [facility] Specialty and Procedure ([facility])    [address] [place] [address] [phone]            Feb 09, 2018 10:45 AM CST   (Arrive by 10:30 AM)   XR CHEST 2 VIEWS with UCXR1   [facility] X[facility] ([facility])    [address] [place] [address] [phone]           Please bring a list of your current medicines to your exam. (Include vitamins, minerals and over-thecounter medicines.) Leave your valuables at home.  Tell your doctor if there is a chance you may be pregnant.  You do not need to do anything special for this exam.            Feb 09, 2018 11:00 AM CST   DX HIP/PELVIS/SPINE with UCDX1   [facility] Dexa ([facility])    [address] [place] [address]    [phone]           Please do not take any of the following 24 hours prior to the day of your exam: vitamins, calcium tablets, antacids.  If possible, please wear clothes without metal (snaps, zippers). A sweatsuit works well.            Feb 09, 2018 11:30 AM CST   CF LOOP with UC PFL CF   [facility] Pulmonary Function Testing ([facility])    [address] [facility]  [address] [facility] [address] [phone]            Feb 09, 2018 11:50 AM CST   (Arrive by 11:35 AM)   RETURN CYSTIC FIBROSIS VISIT with Ziyad Meyer MD   [facility] ([facility])    [address] [address] [phone]              Future tests that were ordered for you today     Open Standing Orders        Priority Remaining Interval Expires Ordered    Cystic Fibrosis Culture Aerob Bacterial Routine 99/100  12/22/2018 12/22/2017          Open Future Orders        Priority Expected Expires Ordered    Basic metabolic panel Routine 3/21/2018 12/22/2018 12/22/2017    Lipid Profile Routine 3/21/2018 12/22/2018 12/22/2017    Albumin level Routine 3/21/2018 12/22/2018 12/22/2017    Alkaline phosphatase Routine 3/21/2018 12/22/2018 12/22/2017    AST Routine 3/21/2018 12/22/2018 12/22/2017    Iron Routine 3/21/2018 12/22/2018 12/22/2017    GGT Routine 3/21/2018 12/22/2018 12/22/2017    Hemoglobin A1c Routine 3/21/2018 12/22/2018 12/22/2017    IgA Routine 3/21/2018 12/22/2018 12/22/2017    IgG Routine 3/21/2018 12/22/2018 12/22/2017    IgM Routine 3/21/2018 12/22/2018 12/22/2017    IgE Routine 3/21/2018 12/22/2018 12/22/2017    INR Routine 3/21/2018 12/22/2018 12/22/2017    Magnesium Routine 3/21/2018 12/22/2018 12/22/2017    Phosphorus Routine 3/21/2018 12/22/2018 12/22/2017    Testosterone total  Routine 3/21/2018 12/22/2018 12/22/2017    Protein total Routine 3/21/2018 12/22/2018 12/22/2017    TSH with free T4 reflex  Routine 3/21/2018 12/22/2018 12/22/2017    Vitamin A Routine 3/21/2018 12/22/2018 12/22/2017    Vitamin E Routine 3/21/2018 12/22/2018 12/22/2017    Vitamin D Deficiency Routine 3/21/2018 12/22/2018 12/22/2017    CBC with platelets differential Routine 3/21/2018 12/22/2018 12/22/2017    Erythrocyte sedimentation rate auto Routine 3/21/2018 12/22/2018 12/22/2017    Routine UA with microscopic Routine 3/21/2018 12/22/2018 12/22/2017    Albumin Random Urine Quantitative with Creat Ratio Routine 3/21/2018 12/22/2018 12/22/2017    Nocardia culture Routine 3/21/2018 12/22/2018 12/22/2017    Fungus Culture, non-blood Routine 3/21/2018 12/22/2018 12/22/2017    AFB Stain Non Blood Routine 3/21/2018 12/22/2018 12/22/2017    AFB Culture Non Blood Routine 3/21/2018 12/22/2018 12/22/2017    Cystic Fibrosis Culture Aerob Bacterial Routine 3/21/2018 12/22/2018 12/22/2017    Spirometry, Breathing Capacity Routine 3/21/2018 12/22/2018 12/22/2017    X-ray Chest 2 vws* Routine 3/21/2018 12/22/2018 12/22/2017    Dexa hip/pelvis/spine* Routine 3/21/2018 12/22/2018 12/22/2017    ALT Routine 3/21/2018 12/22/2018 12/22/2017    CBC with platelets differential Routine 2/2/2018 3/2/2018 12/22/2017    Erythrocyte sedimentation rate auto Routine 2/2/2018 3/2/2018 12/22/2017    Routine UA with microscopic Routine 2/2/2018 3/2/2018 12/22/2017    Albumin Random Urine Quantitative with Creat Ratio Routine 2/2/2018 3/2/2018 12/22/2017    Nocardia culture Routine 2/2/2018 3/2/2018 12/22/2017    Fungus Culture, non-blood Routine 2/2/2018 3/2/2018 12/22/2017    AFB Stain Non Blood Routine 2/2/2018 3/2/2018 12/22/2017    AFB Culture Non Blood Routine 2/2/2018 3/2/2018 12/22/2017    Cystic Fibrosis Culture Aerob Bacterial Routine 2/2/2018 3/2/2018 12/22/2017    Spirometry, Breathing Capacity Routine 2/2/2018 3/2/2018 12/22/2017    X-ray Chest 2 vws* Routine 2/2/2018 3/2/2018 12/22/2017    Dexa hip/pelvis/spine* Routine 2/2/2018 3/2/2018 12/22/2017    ALT  Routine 2/2/2018 3/2/2018 12/22/2017    Basic metabolic panel Routine 2/2/2018 3/2/2018 12/22/2017    Lipid Profile Routine 2/2/2018 3/2/2018 12/22/2017    Albumin level Routine 2/2/2018 3/2/2018 12/22/2017    Alkaline phosphatase Routine 2/2/2018 3/2/2018 12/22/2017    AST Routine 2/2/2018 3/2/2018 12/22/2017    Iron Routine 2/2/2018 3/2/2018 12/22/2017    GGT Routine 2/2/2018 3/2/2018 12/22/2017    Hemoglobin A1c Routine 2/2/2018 3/2/2018 12/22/2017    IgG Routine 2/2/2018 3/2/2018 12/22/2017    IgE Routine 2/2/2018 3/2/2018 12/22/2017    INR Routine 2/2/2018 3/2/2018 12/22/2017    Magnesium Routine 2/2/2018 3/2/2018 12/22/2017    Phosphorus Routine 2/2/2018 3/2/2018 12/22/2017    Testosterone total  Routine 2/2/2018 3/2/2018 12/22/2017    Protein total Routine 2/2/2018 3/2/2018 12/22/2017    TSH with free T4 reflex Routine 2/2/2018 3/2/2018 12/22/2017    Vitamin A Routine 2/2/2018 3/2/2018 12/22/2017    Vitamin E Routine 2/2/2018 3/2/2018 12/22/2017    Vitamin D Deficiency Routine 2/2/2018 3/2/2018 12/22/2017            Who to contact     If you have questions or need follow up information about today's clinic visit or your schedule please contact [facility] directly at [phone].  Normal or non-critical lab and imaging results will be communicated to you by MyChart, letter or phone within 4 business days after the clinic has received the results. If you do not hear from us within 7 days, please contact the clinic through MyChart or phone. If you have a critical or abnormal lab result, we will notify you by phone as soon as possible.  Submit refill requests through [employer] or call your pharmacy and they will forward the refill request to us. Please allow 3 business days for your refill to be completed.          Additional Information About Your Visit        [facility] Information     Health Benefits Direct gives you secure access to your electronic health record. If you see a primary care  "provider, you can also send messages to your care team and make appointments. If you have questions, please call your primary care clinic.  If you do not have a primary care provider, please call [phone] and they will assist you.        Care EveryWhere ID     This is your Care EveryWhere ID. This could be used by other organizations to access your [place] medical records  [id number]        Your Vitals Were     Pulse Respirations Height Pulse Oximetry BMI (Body Mass Index)       59 18 1.75 m (5' 8.9\") 98% 23.44 kg/m2        Blood Pressure from Last 3 Encounters:   12/22/17 155/88   04/19/17 (P) 138/84   12/22/16 146/71    Weight from Last 3 Encounters:   12/22/17 71.8 kg (158 lb 4.6 oz)   04/19/17 (P) 71.8 kg (158 lb 4.6 oz)   11/28/16 70.3 kg (155 lb)              We Performed the Following     Cystic Fibrosis Culture Aerob Bacterial          Today's Medication Changes          These changes are accurate as of: 12/22/17 10:44 AM.  If you have any questions, ask your nurse or doctor.               Start taking these medicines.        Dose/Directions    aztreonam lysine 75 MG Solr   Commonly known as:  CAYSTON   Used for:  CF (cystic fibrosis) (H)   Started by:  Ziyad Meyer MD        Dose:  75 mg   Take 1 mL (75 mg) by nebulization 3 times daily Use 28 days on, 28 days off.   Quantity:  84 mL   Refills:  6       oseltamivir 75 MG capsule   Commonly known as:  TAMIFLU   Used for:  CF (cystic fibrosis) (H), Cystic fibrosis with pulmonary manifestations (H), Gastroesophageal reflux disease without esophagitis, Pancreatic insufficiency, Vitamin D deficiency, Abnormal glucose tolerance test, Screening for diabetes mellitus (DM), Screening for hyperlipidemia, Lipid screening   Started by:  Ziyad Meyer MD        Dose:  75 mg   Take 1 capsule (75 mg) by mouth 2 times daily   Quantity:  10 capsule   Refills:  1       EDU ALTERA NEBULIZER HANDSET Misc   Used for:  Cystic fibrosis with pulmonary " manifestations (H)   Started by:  Ziyad Meyer MD        Dose:  1 Device   1 Device 3 times daily 28 days on, 28 days off.   Quantity:  1 each   Refills:  5       EDU ALTERA NEBULIZER SYSTEM Misc   Used for:  Cystic fibrosis with pulmonary manifestations (H)   Started by:  Ziyad Meyer MD        Dose:  1 Units   1 Units 3 times daily 28 days on, 28 days off.   Quantity:  1 each   Refills:  1         These medicines have changed or have updated prescriptions.        Dose/Directions    * albuterol 108 (90 BASE) MCG/ACT Inhaler   Commonly known as:  PROAIR HFA/PROVENTIL HFA/VENTOLIN HFA   This may have changed:    - when to take this  - additional instructions   Used for:  CF (cystic fibrosis) (H), Bronchiectasis without acute exacerbation (H)   Changed by:  Tri Simmons RN        Dose:  2 puff   Inhale 2 puffs into the lungs every 4 hours as needed for shortness of breath / dyspnea or wheezing   Quantity:  3 Inhaler   Refills:  3       * albuterol (2.5 MG/3ML) 0.083% neb solution   This may have changed:  You were already taking a medication with the same name, and this prescription was added. Make sure you understand how and when to take each.   Used for:  CF (cystic fibrosis) (H), Cystic fibrosis with pulmonary manifestations (H), Gastroesophageal reflux disease without esophagitis, Pancreatic insufficiency, Vitamin D deficiency, Abnormal glucose tolerance test, Screening for diabetes mellitus (DM), Screening for hyperlipidemia, Lipid screening   Changed by:  Ziyad Meyer MD        Dose:  2.5 mg   Take 1 vial (2.5 mg) by nebulization 2 times daily   Quantity:  180 mL   Refills:  11       sodium chloride inhalant 7 % Nebu neb solution   This may have changed:  when to take this   Used for:  Cystic fibrosis with pulmonary manifestations (H), Gastroesophageal reflux disease without esophagitis, Pancreatic insufficiency, Vitamin D deficiency, CF (cystic fibrosis) (H), Abnormal  glucose tolerance test, Screening for diabetes mellitus (DM), Screening for hyperlipidemia, Lipid screening   Changed by:  Ziyad Meyer MD        Dose:  4 mL   Take 4 mLs by nebulization daily   Quantity:  720 mL   Refills:  3       ZENPEP 41390 UNITS Cpep   This may have changed:  additional instructions   Used for:  Cystic fibrosis with pulmonary manifestations (H), Gastroesophageal reflux disease without esophagitis, Pancreatic insufficiency, Vitamin D deficiency, CF (cystic fibrosis) (H), Abnormal glucose tolerance test, Screening for diabetes mellitus (DM), Screening for hyperlipidemia, Lipid screening   Generic drug:  amylase-lipase-protease   Changed by:  Ziyad Meyer MD        Take 6 with meals and 3 with snacks. (3 meals and 3 snacks per day)   Quantity:  3240 capsule   Refills:  3       * Notice:  This list has 2 medication(s) that are the same as other medications prescribed for you. Read the directions carefully, and ask your doctor or other care provider to review them with you.         Where to get your medicines      These medications were sent to [place] [address]     Phone:  [phone]     albuterol (2.5 MG/3ML) 0.083% neb solution    oseltamivir 75 MG capsule         These medications were sent to [address]  [address] [facility] [address]     Phone:  [phone]     EDU ALTERA NEBULIZER HANDSET Mis    EDU ALTERA NEBULIZER SYSTEM [facility]         Call your pharmacy to confirm that your medication is ready for pickup. It may take up to 24 hours for them to receive the prescription. If the prescription is not ready within 3 business days, please contact your clinic or your provider.     We will let you know when these medications are ready. If you don't hear back within 3 business days, please contact us.     aztreonam lysine 75 MG Solr                 Primary Care Provider Office Phone # Fax #    Dorene Mireles [phone]       [facility] [address]  [facility] [address]        Equal Access to Services     POLLY CHAU : Hadii aad ku hadcarola Taylor, waraeganda luqvishal, qaybta kacraig sen, samantha ziegler kaldaniel atulmariloujoshua chavis. So [facility] [phone].    ATENCIÓN: Si habla español, tiene a babcock disposición servicios gratuitos de asistencia lingüística. Llame al [phone].    We comply with applicable federal civil rights laws and [place] laws. We do not discriminate on the basis of race, color, national origin, age, disability, sex, sexual orientation, or gender identity.            Thank you!     Thank you for choosing [facility]  for your care. Our goal is always to provide you with excellent care. Hearing back from our patients is one way we can continue to improve our services. Please take a few minutes to complete the written survey that you may receive in the mail after your visit with us. Thank you!             Your Updated Medication List - Protect others around you: Learn how to safely use, store and throw away your medicines at www.disposemymeds.org.          This list is accurate as of: 12/22/17 10:44 AM.  Always use your most recent med list.                   Brand Name Dispense Instructions for use Diagnosis    * albuterol 108 (90 BASE) MCG/ACT Inhaler    PROAIR HFA/PROVENTIL HFA/VENTOLIN HFA    3 Inhaler    Inhale 2 puffs into the lungs every 4 hours as needed for shortness of breath / dyspnea or wheezing    CF (cystic fibrosis) (H), Bronchiectasis without acute exacerbation (H)       * albuterol (2.5 MG/3ML) 0.083% neb solution     180 mL    Take 1 vial (2.5 mg) by nebulization 2 times daily    CF (cystic fibrosis) (H), Cystic fibrosis with pulmonary manifestations (H), Gastroesophageal reflux disease without esophagitis, Pancreatic insufficiency, Vitamin D deficiency, Abnormal  glucose tolerance test, Screening for diabetes mellitus (DM), Screening for hyperlipidemia, Lipid screening       azithromycin 500 MG tablet    ZITHROMAX    39 tablet    Take 1 tablet (500 mg) by mouth Every Mon, Wed, Fri Morning    Cystic fibrosis with pulmonary manifestations (H), Gastroesophageal reflux disease without esophagitis, Pancreatic insufficiency, Vitamin D deficiency       aztreonam lysine 75 MG Solr    CAYSTON    84 mL    Take 1 mL (75 mg) by nebulization 3 times daily Use 28 days on, 28 days off.    CF (cystic fibrosis) (H)       dornase alpha 1 MG/ML neb solution    PULMOZYME    75 mL    Inhale 2.5 mg into the lungs daily    Cystic fibrosis (H)       lumacaftor-ivacaftor 200-125 MG tablet    ORKAMBI    336 tablet    TAKE 2 TABLETS BY MOUTH EVERY 12 HOURS WITH FAT CONTAINING FOOD. STOREAT ROOM TEMPERATURE.    CF (cystic fibrosis) (H)       multivitamin CF formula chewable tablet     60 tablet    Take 2 tablets by mouth daily    Cystic fibrosis with pulmonary manifestations (H), Gastroesophageal reflux disease without esophagitis, Pancreatic insufficiency, Vitamin D deficiency       oseltamivir 75 MG capsule    TAMIFLU    10 capsule    Take 1 capsule (75 mg) by mouth 2 times daily    CF (cystic fibrosis) (H), Cystic fibrosis with pulmonary manifestations (H), Gastroesophageal reflux disease without esophagitis, Pancreatic insufficiency, Vitamin D deficiency, Abnormal glucose tolerance test, Screening for diabetes mellitus (DM), Screening for hyperlipidemia, Lipid screening       pantoprazole 40 MG EC tablet    PROTONIX    180 tablet    Take 1 tablet (40 mg) by mouth 2 times daily    Gastroesophageal reflux disease without esophagitis, Cystic fibrosis with pulmonary manifestations (H), Pancreatic insufficiency, Vitamin D deficiency       EDU JAMES NEBULIZER HANDSET Misc     1 each    1 Device 3 times daily 28 days on, 28 days off.    Cystic fibrosis with pulmonary manifestations (H)       EDU GUMEA  NEBULIZER SYSTEM Misc     1 each    1 Units 3 times daily 28 days on, 28 days off.    Cystic fibrosis with pulmonary manifestations (H)       ranitidine 150 MG tablet    ZANTAC    60 tablet    Take 1 tablet (150 mg) by mouth 2 times daily    Cystic fibrosis with pulmonary manifestations (H), Gastroesophageal reflux disease without esophagitis, Pancreatic insufficiency, Vitamin D deficiency       sodium chloride 0.65 % nasal spray    OCEAN    88 mL    Spray 2 sprays into both nostrils every 2 hours (while awake)    Chronic pansinusitis       sodium chloride inhalant 7 % Nebu neb solution     720 mL    Take 4 mLs by nebulization daily    Cystic fibrosis with pulmonary manifestations (H), Gastroesophageal reflux disease without esophagitis, Pancreatic insufficiency, Vitamin D deficiency, CF (cystic fibrosis) (H), Abnormal glucose tolerance test, Screening for diabetes mellitus (DM), Screening for hyperlipidemia, Lipid screening       vitamin D 2000 UNITS Caps     60 capsule    Take 2 capsules by mouth daily    Cystic fibrosis with pulmonary manifestations (H), Gastroesophageal reflux disease without esophagitis, Pancreatic insufficiency, Vitamin D deficiency       ZENPEP 70582 UNITS Cpep   Generic drug:  amylase-lipase-protease     3240 capsule    Take 6 with meals and 3 with snacks. (3 meals and 3 snacks per day)    Cystic fibrosis with pulmonary manifestations (H), Gastroesophageal reflux disease without esophagitis, Pancreatic insufficiency, Vitamin D deficiency, CF (cystic fibrosis) (H), Abnormal glucose tolerance test, Screening for diabetes mellitus (DM), Screening for hyperlipidemia, Lipid screening       * Notice:  This list has 2 medication(s) that are the same as other medications prescribed for you. Read the directions carefully, and ask your doctor or other care provider to review them with you.

## 2017-12-22 NOTE — PATIENT INSTRUCTIONS
Cystic Fibrosis Self-Care Plan    RECOMMENDATIONS:   Vest therapy twice daily for 30 minutes with coughing and huffing every 5 minute.  Albuterol nebs first with every therapy.  Hypertonic saline with morning therapy.  Pulmozyme with evening therapy.  Otherwise continue current medication.      Signs and Symptoms of the flu are temperature > 100.5, body aches, increased cough,( nausea, diarrhea, headache). Start Tamiflu within 48 hours of onset of symptoms and please notify the CF office.    [facility]:[phone]. [place] prescription has been sent here. Please call the CF office ([phone]) once you receive it to schedule a test dose to be done in clinic. Bring Albuterol MDI along with nebulizer and Cay[place] when doing the test dose.    YOUR GOAL:Enjoy the holidays!      CF Nurse line:          Ramirez Marlow   [phone]            CF Resp Therapist: Alyse Cheek            [phone]   CF Dietitians:           Willa Romero            [phone]                       Alondra Vitale                       [phone]   CF Diabetes Nurse: Livia Mondragon             [phone]    CF Social Workers:  Kaykay Vieyra             [phone]                Ayala Andino            [phone]  CF Pharmacist:        Jael Arvizu                              [phone]  [email or web address]         MRN: [id number]   Clinic Date: December 22, 2017   Patient: Michele Altamirano     Annual Studies:   IGG   Date Value Ref Range Status   09/24/2015  695 - 1620 mg/dL Final    Canceled, Test credited   Canceled by station  As requested by nurse 1020,9/24/2015.  MR       Insulin   Date Value Ref Range Status   09/24/2015 29 mU/L Final     Comment:     Reference Range:  0-20     There are no preventive care reminders to display for this patient.      Pulmonary Function Tests  FEV1: amount of air you can blow out in 1 second  FVC: total amount of air you can take in and blow out    Your Goals:         PFT Latest  Ref Rng & Units 12/22/2017   FVC L 3.68   FEV1 L 2.37   FVC% % 77   FEV1% % 62          Airway Clearance: The Most Important Way to Keep Your Lungs Healthy  Vest Settings:    Hill-Rom Frequencies: 8, 9, 10 Pressure 10 Then, Frequencies 18, 19, 20 Pressure 6      RespirTech: Quick Start with Pressure of     Do each frequency for 5 minutes; Deflate vest after each frequency & cough 3 times before beginning the next setting.    Vest and Neb Therapy should be done 2 times/day.    Good Nutrition Can Improve Lung Function and Overall Health     Take ALL of your vitamins with food     Take 1/2 of your enzymes before EVERY meal/snack and the other 1/2 mid-meal/snack    Wt Readings from Last 3 Encounters:   04/19/17 (P) 71.8 kg (158 lb 4.6 oz)   11/28/16 70.3 kg (155 lb)   10/18/16 73.1 kg (161 lb 2.5 oz)       There is no height or weight on file to calculate BMI.         National CF Foundation Recommendations for BMI in CF Adults: Women: at least 22 Men: at least 23        Controlling Blood Sugars Helps Prevent Lung Infections & Improves Nutrition  Test blood sugar:     In the morning before eating (goal is )     2 hours after a meal (goal is less than 150)     When pre-meal glucose is greater than 150 add correction     At bedtime (if less than 100 eat a snack with 15 grams of carbohydrates  Last A1C Results:   Hemoglobin A1C   Date Value Ref Range Status   10/18/2016 6.3 (H) 4.3 - 6.0 % Final         If diabetic, measure A1C every 6 months. Goal: Under 7%    Staying Healthy    Research:  If you are interested in learning about research opportunities or have questions, please contact Jessenia Dalton at [phone] or [email or web address].      CF Foundation:  Compass is a personalized resource service to help you with the insurance, financial, legal and other issues you are facing.  It's free, confidential and available to anyone with CF.  Ask your  for more information or contact Compass directly at  844-[facility] ([phone]) or compass@cff.org, or learn more at cff.org/compass.

## 2017-12-22 NOTE — PROGRESS NOTES
CF Annual Nutrition Assessment    Reason for Assessment  Assessed during Dr. Meyer clinic r/t increased nutrition risk with diagnosis of CF per protocol.    Nutrition Significant PMH  Moderate Lung Disease  Pancreatic Insufficient   GERD  Abnormal OGTT    Social Assessment  Living situation: Lives with his wife and son in a house in [place], [place].   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    Food Insecurity:  None    Anthropometric Assessment  Height: 175 cm  IBW based on BMI 22 for females and 23 for males per CF Foundation recs: 70.7 kg  Today's Weight: 71.8 kg (actual weight)   %IBW: 102%  Body mass index is 23.44 kg/(m^2).   Current weight is considered: Normal BMI and at CF goal    Wt Readings from Last 10 Encounters:   17 71.8 kg (158 lb 4.6 oz)   17 (P) 71.8 kg (158 lb 4.6 oz)   16 70.3 kg (155 lb)   10/18/16 73.1 kg (161 lb 2.5 oz)   16 72.7 kg (160 lb 3.2 oz)   16 74 kg (163 lb 2.3 oz)   16 72.4 kg (159 lb 9.8 oz)   11/20/15 71.3 kg (157 lb 1.6 oz)   09/29/15 70.8 kg (156 lb 1.4 oz)   06/02/15 71 kg (156 lb 8.4 oz)       Weight Status:  Stable, at CF goal.  Pt happy with weight and doesn't feel the need to focus on it too much since he's stable.     Physical Activity/Exercise:  Not discussed this visit.    MALNUTRITION  Patient does not meet 2 of the criteria necessary for diagnosing malnutrition.    Pancreatic Enzymes  Brand:  Zenpep [id number]  Dosin with meals, 2-3 with snacks  (~22 per day on average)    High dose providing 1671 units lipase/kg/meal which is within the recommended range per CF Foundation to inhibit fibrosing colonopathy.    Are you taking enzymes as recommended: Yes (takes most before, some during)  Signs of Malabsorption:  No  Enzyme Program:  [id number], very active, loves this program and using [employer] for extra nutrition items    Diet History and Assessment  Diet Preferences/Allergies/Intolerances:  Regular, tries for  "moderate carb intake. Hx of suspected delayed gastric emptying and sometimes alters his diet/reduces portions due to early satiety/feeling of food getting \"stuck\" in his stomach.      Intake Recall/Comments:  Eats three meals per day and drives for a high calorie but healthy/balanced diet.  Very interested in the recommended proportions of macronutrients for all meals, particularly from a diabetes standpoint.  Tends to eat breakfast meals that are somewhat high in sugar/simple CHO sources and tends to get post-prandial hypoglycemia mid-morning unless he has a snack around 10:30.  This has improved a bit since he added peanut butter to his breakfast for a source of protein + fat (ex bkfst: white toast with peanut butter, Scandishake made with whole milk, juice or coffee).   In the past when he has seen an endocrinologist they have recommended a consistent carbohydrate diet, he tries to stick to this by eating mostly the same meals around the same times everyday.  Of note, he currently does not see an endocrinologist but would consider a referral pending the results of his next OGTT.     Calcium: Drinks whole milk plus Scandishakes, likely 3-4 servings/day      -One packet Scandishake + whole milk = ~400 mg calcium  Salt: Not discussed this visit  Hydration:  Good/stable, >60 ounces per day  Supplements:  Yes, once Scandishake daily in the AM. Receives these from [facility].     Estimated Energy and Protein Needs  Estimation based on weight maintenance with Moderate lung disease and pancreatic insufficient.    BEE: ~1600  3778-8353 kcals/day 200-225% BEE  105-145 g protein/day 1.5-2 g/kg    Laboratory Assessment    Vitamin A   Date Value Ref Range Status   03/27/2012 0.45  Final     Comment:     Reference range: 0.30 to 1.20  Unit: mg/L     Vitamin D Deficiency screening   Date Value Ref Range Status   09/24/2015  20 - 75 ug/L Final    Canceled, Test credited  Credit per order location       Vitamin E   Date " Value Ref Range Status   03/27/2012 11.4  Final     Comment:     Reference range: 5.5 to 18.0  Unit: mg/L     Iron   Date Value Ref Range Status   04/12/2016 146 35 - 180 ug/dL Final     Cholesterol   Date Value Ref Range Status   09/24/2015 160 <200 mg/dL Final     Comment:     LDL Cholesterol is the primary guide to therapy.   The NCEP recommends further evaluation of: patients with cholesterol greater   than 200 mg/dL if additional risk factors are present, cholesterol greater   than   240 mg/dL, triglycerides greater than 150 mg/dL, or HDL less than 40 mg/dL.       Triglycerides   Date Value Ref Range Status   09/24/2015 64 0 - 150 mg/dL Final     HDL Cholesterol   Date Value Ref Range Status   09/24/2015 70 >40 mg/dL Final     LDL Cholesterol Calculated   Date Value Ref Range Status   09/24/2015 77 0 - 129 mg/dL Final     Comment:     LDL Cholesterol is the primary guide to therapy: LDL-cholesterol goal in high   risk patients is <100 mg/dL and in very high risk patients is <70 mg/dL.       VLDL-Cholesterol   Date Value Ref Range Status   09/24/2015 13 0 - 30 mg/dL Final     Cholesterol/HDL Ratio   Date Value Ref Range Status   09/24/2015 2.3 0.0 - 5.0 Final     Annual studies overdue, pt plans to complete them next visit (in 6 weeks).    Current Vitamin/Mineral Supplements: MVW Complete 1 softgel BID, Vitamin D International Units and Vitamin E 400 International Units BID    Comments:  Pt receives all of his supplements at no cost from the [employer] program.       NUTRITION DIAGNOSIS  Impaired nutrient utilization r/t CF lung disease and CF-related pancreatic insufficiency AEB requires pancreatic enzyme replacement therapy, high dose vitamin/mineral supplementation and high kcal/pro diet to maintain nutrition and health status.    INTERVENTIONS/RECOMMENDATIONS  1) Endocrine:  Discussed recommended oral intake for management of blood sugars including reducing intake of simple sugars and choosing more  sources of whole grains/complex CHO in replacement.  Recommended pt add protein, fat, and fiber to meals to prevent post-prandial hypoglycemia and avoiding high intakes of simple CHO in one sitting.  Reviewed guidelines for correcting low blood sugar and encouraged pt to have a source of >15 grams sugar with him at all times (juice, glucose tablets, etc), particularly those times he is prone to having hypoglycemia.        -Pt will repeat an OGTT with next annual studies, may recommend Endocrine referral at that time pending results.     2) Pt overdue for annual studies including a DEXA scan.  He will complete these tests in 6 weeks at his next follow-up appointment.  Provided education on annual studies tests and procedures including vitamin levels and bone scan.  Reviewed vitamin/mineral regimen 1:1 with pt and discussed that we may decide to recommend changes pending the results of his annual labs. Will follow.      Patient Understanding: Good  Expected Compliance: Good  Follow-Up Plans: Per protocol    GOALS:  1) Weight maintenance to BMI of 23 kg/m2 or above.   2) 100% compliance with prescribed enzymes, vitamin/mineral supplements.    FOLLOW-UP/MONITORING:  Visit patient within 12 month(s) for annual follow-up, review annual studies once complete and adjust nutrition plan of care accordingly.     Time Spent In Face-to-Face Patient Interactions:  15 minutes      Alondra Vitale MS, RD, LD (pager [phone])  Cystic Fibrosis/Lung Transplant Dietitian      -Available Tues-Fri 8 AM-4:30 PM. On Mondays please contact pager [phone] (Willa Romero RD) and on weekends/holidays contact coverage dietitian at pager [phone] (inpatient use only).

## 2017-12-22 NOTE — NURSING NOTE
Chief Complaint   Patient presents with     Cystic Fibrosis     Patient is being seen for CF follow up     Michele Lazcano CMA at 8:56 AM on 12/22/2017

## 2017-12-26 ENCOUNTER — TELEPHONE (OUTPATIENT)
Dept: PHARMACY | Facility: CLINIC | Age: 52
End: 2017-12-26

## 2017-12-26 NOTE — TELEPHONE ENCOUNTER
Prior Authorization Approval    Authorization Effective Date: 12/22/2017  Authorization Expiration Date: 12/22/2019  Medication: [place] Approved  Approved Dose/Quantity: 84 PER 28 DAYS  Reference #:     Insurance Company: [employer] [phone] Fax [phone]  Expected CoPay:       CoPay Card Available:      Foundation Assistance Needed:    Which Pharmacy is filling the prescription (Not needed for infusion/clinic administered):    Pharmacy Notified:  YES  Patient Notified:

## 2017-12-27 LAB
BACTERIA SPEC CULT: ABNORMAL
SPECIMEN SOURCE: ABNORMAL

## 2018-02-09 ENCOUNTER — OFFICE VISIT (OUTPATIENT)
Dept: PHARMACY | Facility: CLINIC | Age: 53
End: 2018-02-09
Payer: COMMERCIAL

## 2018-02-09 ENCOUNTER — RADIANT APPOINTMENT (OUTPATIENT)
Dept: BONE DENSITY | Facility: CLINIC | Age: 53
End: 2018-02-09
Attending: INTERNAL MEDICINE
Payer: COMMERCIAL

## 2018-02-09 ENCOUNTER — OFFICE VISIT (OUTPATIENT)
Dept: PULMONOLOGY | Facility: CLINIC | Age: 53
End: 2018-02-09
Attending: INTERNAL MEDICINE
Payer: COMMERCIAL

## 2018-02-09 ENCOUNTER — RADIANT APPOINTMENT (OUTPATIENT)
Dept: GENERAL RADIOLOGY | Facility: CLINIC | Age: 53
End: 2018-02-09
Attending: INTERNAL MEDICINE
Payer: COMMERCIAL

## 2018-02-09 ENCOUNTER — INFUSION THERAPY VISIT (OUTPATIENT)
Dept: INFUSION THERAPY | Facility: CLINIC | Age: 53
End: 2018-02-09
Attending: INTERNAL MEDICINE
Payer: COMMERCIAL

## 2018-02-09 VITALS
TEMPERATURE: 96 F | BODY MASS INDEX: 23.59 KG/M2 | WEIGHT: 159.3 LBS | DIASTOLIC BLOOD PRESSURE: 84 MMHG | OXYGEN SATURATION: 98 % | HEART RATE: 62 BPM | SYSTOLIC BLOOD PRESSURE: 131 MMHG | RESPIRATION RATE: 14 BRPM

## 2018-02-09 VITALS
BODY MASS INDEX: 23.59 KG/M2 | DIASTOLIC BLOOD PRESSURE: 84 MMHG | SYSTOLIC BLOOD PRESSURE: 131 MMHG | HEART RATE: 62 BPM | TEMPERATURE: 96 F | RESPIRATION RATE: 14 BRPM | OXYGEN SATURATION: 98 % | WEIGHT: 159.3 LBS

## 2018-02-09 DIAGNOSIS — E84.9 CYSTIC FIBROSIS (H): ICD-10-CM

## 2018-02-09 DIAGNOSIS — K21.9 GASTROESOPHAGEAL REFLUX DISEASE WITHOUT ESOPHAGITIS: ICD-10-CM

## 2018-02-09 DIAGNOSIS — Z13.220 LIPID SCREENING: ICD-10-CM

## 2018-02-09 DIAGNOSIS — Z13.1 SCREENING FOR DIABETES MELLITUS (DM): ICD-10-CM

## 2018-02-09 DIAGNOSIS — E84.9 CF (CYSTIC FIBROSIS) (H): Primary | ICD-10-CM

## 2018-02-09 DIAGNOSIS — E84.0 CYSTIC FIBROSIS OF THE LUNG (H): ICD-10-CM

## 2018-02-09 DIAGNOSIS — R73.09 ABNORMAL GLUCOSE TOLERANCE TEST: ICD-10-CM

## 2018-02-09 DIAGNOSIS — K86.89 PANCREATIC INSUFFICIENCY: ICD-10-CM

## 2018-02-09 DIAGNOSIS — E84.9 CYSTIC FIBROSIS (H): Primary | ICD-10-CM

## 2018-02-09 DIAGNOSIS — E55.9 VITAMIN D DEFICIENCY: ICD-10-CM

## 2018-02-09 DIAGNOSIS — Z13.220 SCREENING FOR HYPERLIPIDEMIA: ICD-10-CM

## 2018-02-09 DIAGNOSIS — E84.9 CF (CYSTIC FIBROSIS) (H): ICD-10-CM

## 2018-02-09 DIAGNOSIS — E84.0 CYSTIC FIBROSIS WITH PULMONARY MANIFESTATIONS (H): ICD-10-CM

## 2018-02-09 LAB
ALBUMIN SERPL-MCNC: 4.2 G/DL (ref 3.4–5)
ALBUMIN UR-MCNC: NEGATIVE MG/DL
ALP SERPL-CCNC: 65 U/L (ref 40–150)
ALT SERPL W P-5'-P-CCNC: 36 U/L (ref 0–70)
ANION GAP SERPL CALCULATED.3IONS-SCNC: 8 MMOL/L (ref 3–14)
APPEARANCE UR: CLEAR
AST SERPL W P-5'-P-CCNC: 35 U/L (ref 0–45)
BASOPHILS # BLD AUTO: 0.1 10E9/L (ref 0–0.2)
BASOPHILS NFR BLD AUTO: 1.2 %
BILIRUB DIRECT SERPL-MCNC: <0.1 MG/DL (ref 0–0.2)
BILIRUB SERPL-MCNC: 0.4 MG/DL (ref 0.2–1.3)
BILIRUB UR QL STRIP: NEGATIVE
BUN SERPL-MCNC: 15 MG/DL (ref 7–30)
CALCIUM SERPL-MCNC: 8.6 MG/DL (ref 8.5–10.1)
CHLORIDE SERPL-SCNC: 101 MMOL/L (ref 94–109)
CHOLEST SERPL-MCNC: 164 MG/DL
CO2 SERPL-SCNC: 28 MMOL/L (ref 20–32)
COLOR UR AUTO: YELLOW
CREAT SERPL-MCNC: 0.82 MG/DL (ref 0.66–1.25)
CREAT UR-MCNC: 85 MG/DL
DEPRECATED CALCIDIOL+CALCIFEROL SERPL-MC: 23 UG/L (ref 20–75)
DIFFERENTIAL METHOD BLD: NORMAL
EOSINOPHIL # BLD AUTO: 0.1 10E9/L (ref 0–0.7)
EOSINOPHIL NFR BLD AUTO: 2.5 %
ERYTHROCYTE [DISTWIDTH] IN BLOOD BY AUTOMATED COUNT: 13.2 % (ref 10–15)
ERYTHROCYTE [SEDIMENTATION RATE] IN BLOOD BY WESTERGREN METHOD: 7 MM/H (ref 0–20)
EXPTIME-PRE: 13.37 SEC
FEF2575-%PRED-PRE: 40 %
FEF2575-PRE: 1.36 L/SEC
FEF2575-PRED: 3.41 L/SEC
FEFMAX-%PRED-PRE: 75 %
FEFMAX-PRE: 7.23 L/SEC
FEFMAX-PRED: 9.52 L/SEC
FEV1-%PRED-PRE: 67 %
FEV1-PRE: 2.52 L
FEV1FEV6-PRE: 70 %
FEV1FEV6-PRED: 80 %
FEV1FVC-PRE: 67 %
FEV1FVC-PRED: 79 %
FIFMAX-PRE: 6.77 L/SEC
FVC-%PRED-PRE: 79 %
FVC-PRE: 3.78 L
FVC-PRED: 4.76 L
GFR SERPL CREATININE-BSD FRML MDRD: >90 ML/MIN/1.7M2
GGT SERPL-CCNC: 62 U/L (ref 0–75)
GLUCOSE BLDC GLUCOMTR-MCNC: 203 MG/DL (ref 70–99)
GLUCOSE SERPL-MCNC: 104 MG/DL (ref 70–99)
GLUCOSE SERPL-MCNC: 105 MG/DL (ref 70–99)
GLUCOSE SERPL-MCNC: 146 MG/DL (ref 70–99)
GLUCOSE SERPL-MCNC: 203 MG/DL (ref 70–99)
GLUCOSE SERPL-MCNC: 221 MG/DL (ref 70–99)
GLUCOSE SERPL-MCNC: 255 MG/DL (ref 70–99)
GLUCOSE UR STRIP-MCNC: 50 MG/DL
HBA1C MFR BLD: 6.3 % (ref 4.3–6)
HCT VFR BLD AUTO: 45.3 % (ref 40–53)
HDLC SERPL-MCNC: 68 MG/DL
HGB BLD-MCNC: 15.4 G/DL (ref 13.3–17.7)
HGB UR QL STRIP: NEGATIVE
IGG SERPL-MCNC: 883 MG/DL (ref 695–1620)
IMM GRANULOCYTES # BLD: 0 10E9/L (ref 0–0.4)
IMM GRANULOCYTES NFR BLD: 0.2 %
INR PPP: 0.89 (ref 0.86–1.14)
INSULIN SERPL-ACNC: 20.6 MU/L (ref 3–25)
INSULIN SERPL-ACNC: 36.2 MU/L (ref 3–25)
INSULIN SERPL-ACNC: 37.8 MU/L (ref 3–25)
INSULIN SERPL-ACNC: 4.4 MU/L (ref 3–25)
INSULIN SERPL-ACNC: NORMAL MU/L (ref 3–25)
IRON SERPL-MCNC: 132 UG/DL (ref 35–180)
KETONES UR STRIP-MCNC: NEGATIVE MG/DL
LDLC SERPL CALC-MCNC: 80 MG/DL
LEUKOCYTE ESTERASE UR QL STRIP: NEGATIVE
LYMPHOCYTES # BLD AUTO: 1.8 10E9/L (ref 0.8–5.3)
LYMPHOCYTES NFR BLD AUTO: 42 %
MAGNESIUM SERPL-MCNC: 2.1 MG/DL (ref 1.6–2.3)
MCH RBC QN AUTO: 29.6 PG (ref 26.5–33)
MCHC RBC AUTO-ENTMCNC: 34 G/DL (ref 31.5–36.5)
MCV RBC AUTO: 87 FL (ref 78–100)
MICROALBUMIN UR-MCNC: 5 MG/L
MICROALBUMIN/CREAT UR: 6.33 MG/G CR (ref 0–17)
MONOCYTES # BLD AUTO: 0.5 10E9/L (ref 0–1.3)
MONOCYTES NFR BLD AUTO: 11.3 %
MUCOUS THREADS #/AREA URNS LPF: PRESENT /LPF
NEUTROPHILS # BLD AUTO: 1.9 10E9/L (ref 1.6–8.3)
NEUTROPHILS NFR BLD AUTO: 42.8 %
NITRATE UR QL: NEGATIVE
NONHDLC SERPL-MCNC: 96 MG/DL
NRBC # BLD AUTO: 0 10*3/UL
NRBC BLD AUTO-RTO: 0 /100
PH UR STRIP: 6 PH (ref 5–7)
PHOSPHATE SERPL-MCNC: 2.8 MG/DL (ref 2.5–4.5)
PLATELET # BLD AUTO: 246 10E9/L (ref 150–450)
POTASSIUM SERPL-SCNC: 3.4 MMOL/L (ref 3.4–5.3)
PROT SERPL-MCNC: 8 G/DL (ref 6.8–8.8)
RBC # BLD AUTO: 5.21 10E12/L (ref 4.4–5.9)
RBC #/AREA URNS AUTO: 1 /HPF (ref 0–2)
SODIUM SERPL-SCNC: 137 MMOL/L (ref 133–144)
SOURCE: ABNORMAL
SP GR UR STRIP: 1.01 (ref 1–1.03)
TRIGL SERPL-MCNC: 78 MG/DL
TSH SERPL DL<=0.005 MIU/L-ACNC: 2.23 MU/L (ref 0.4–4)
UROBILINOGEN UR STRIP-MCNC: 0 MG/DL (ref 0–2)
WBC # BLD AUTO: 4.3 10E9/L (ref 4–11)
WBC #/AREA URNS AUTO: 0 /HPF (ref 0–2)

## 2018-02-09 PROCEDURE — 85610 PROTHROMBIN TIME: CPT | Performed by: INTERNAL MEDICINE

## 2018-02-09 PROCEDURE — 84446 ASSAY OF VITAMIN E: CPT | Performed by: INTERNAL MEDICINE

## 2018-02-09 PROCEDURE — 85025 COMPLETE CBC W/AUTO DIFF WBC: CPT | Performed by: INTERNAL MEDICINE

## 2018-02-09 PROCEDURE — 82977 ASSAY OF GGT: CPT | Performed by: INTERNAL MEDICINE

## 2018-02-09 PROCEDURE — 83525 ASSAY OF INSULIN: CPT | Performed by: INTERNAL MEDICINE

## 2018-02-09 PROCEDURE — 87186 SC STD MICRODIL/AGAR DIL: CPT | Performed by: INTERNAL MEDICINE

## 2018-02-09 PROCEDURE — 82947 ASSAY GLUCOSE BLOOD QUANT: CPT | Mod: 91 | Performed by: INTERNAL MEDICINE

## 2018-02-09 PROCEDURE — 83735 ASSAY OF MAGNESIUM: CPT | Performed by: INTERNAL MEDICINE

## 2018-02-09 PROCEDURE — 83540 ASSAY OF IRON: CPT | Performed by: INTERNAL MEDICINE

## 2018-02-09 PROCEDURE — 84590 ASSAY OF VITAMIN A: CPT | Performed by: INTERNAL MEDICINE

## 2018-02-09 PROCEDURE — G0463 HOSPITAL OUTPT CLINIC VISIT: HCPCS | Mod: ZF

## 2018-02-09 PROCEDURE — 82306 VITAMIN D 25 HYDROXY: CPT | Performed by: INTERNAL MEDICINE

## 2018-02-09 PROCEDURE — 80061 LIPID PANEL: CPT | Performed by: INTERNAL MEDICINE

## 2018-02-09 PROCEDURE — 81001 URINALYSIS AUTO W/SCOPE: CPT | Performed by: INTERNAL MEDICINE

## 2018-02-09 PROCEDURE — 82947 ASSAY GLUCOSE BLOOD QUANT: CPT | Performed by: INTERNAL MEDICINE

## 2018-02-09 PROCEDURE — 82784 ASSAY IGA/IGD/IGG/IGM EACH: CPT | Performed by: INTERNAL MEDICINE

## 2018-02-09 PROCEDURE — 85652 RBC SED RATE AUTOMATED: CPT | Performed by: INTERNAL MEDICINE

## 2018-02-09 PROCEDURE — 83036 HEMOGLOBIN GLYCOSYLATED A1C: CPT | Performed by: INTERNAL MEDICINE

## 2018-02-09 PROCEDURE — 84100 ASSAY OF PHOSPHORUS: CPT | Performed by: INTERNAL MEDICINE

## 2018-02-09 PROCEDURE — 80076 HEPATIC FUNCTION PANEL: CPT | Performed by: INTERNAL MEDICINE

## 2018-02-09 PROCEDURE — 83525 ASSAY OF INSULIN: CPT | Mod: 91 | Performed by: INTERNAL MEDICINE

## 2018-02-09 PROCEDURE — 80048 BASIC METABOLIC PNL TOTAL CA: CPT | Performed by: INTERNAL MEDICINE

## 2018-02-09 PROCEDURE — 87077 CULTURE AEROBIC IDENTIFY: CPT | Performed by: INTERNAL MEDICINE

## 2018-02-09 PROCEDURE — 82043 UR ALBUMIN QUANTITATIVE: CPT | Performed by: INTERNAL MEDICINE

## 2018-02-09 PROCEDURE — 82962 GLUCOSE BLOOD TEST: CPT

## 2018-02-09 PROCEDURE — 84155 ASSAY OF PROTEIN SERUM: CPT | Performed by: INTERNAL MEDICINE

## 2018-02-09 PROCEDURE — 25000125 ZZHC RX 250: Mod: ZF | Performed by: INTERNAL MEDICINE

## 2018-02-09 PROCEDURE — 82785 ASSAY OF IGE: CPT | Performed by: INTERNAL MEDICINE

## 2018-02-09 PROCEDURE — 99605 MTMS BY PHARM NP 15 MIN: CPT | Performed by: PHARMACIST

## 2018-02-09 PROCEDURE — 87070 CULTURE OTHR SPECIMN AEROBIC: CPT | Performed by: INTERNAL MEDICINE

## 2018-02-09 PROCEDURE — 84443 ASSAY THYROID STIM HORMONE: CPT | Performed by: INTERNAL MEDICINE

## 2018-02-09 PROCEDURE — 84403 ASSAY OF TOTAL TESTOSTERONE: CPT | Performed by: INTERNAL MEDICINE

## 2018-02-09 RX ADMIN — ALCOHOL 75 G: 65 GEL TOPICAL at 08:39

## 2018-02-09 ASSESSMENT — PAIN SCALES - GENERAL: PAINLEVEL: NO PAIN (0)

## 2018-02-09 NOTE — PATIENT INSTRUCTIONS
Cystic Fibrosis Self-Care Plan    RECOMMENDATIONS:   Consider massage or Yoga    Otherwise continue current medication, nebs and vest therapy.           CF Nurse line:          Ramirez Marlow   [phone]            CF Resp Therapist: Alyse Cheek            [phone]   CF Dietitians:           Willa Romero            [phone]                       Alondra Vitale                       [phone]   CF Diabetes Nurse: Livia Mondragon             [phone]    CF Social Workers:  Kaykay Vieyra             [phone]                Ayala Andino            [phone]  CF Pharmacist:        Jael Arvizu                              [phone]  [email or web address]         MRN: [id number]   Clinic Date: February 9, 2018   Patient: Michele Altamirano     Annual Studies:   IGG   Date Value Ref Range Status   09/24/2015  695 - 1620 mg/dL Final    Canceled, Test credited   Canceled by station  As requested by nurse 1020,9/24/2015.  MR       Insulin   Date Value Ref Range Status   02/09/2018 37.8 (H) 3 - 25 mU/L Final     Comment:     Starting 7/13/2017, insulin results will decrease by approximately 37%   compared to insulin results reported in EPIC between (12/16/2016-7/12/2017),   and should be interpreted accordingly. Insulin results reported prior to   12/16/16 are comparable to current insulin results and therefore no adjustment   is needed.       There are no preventive care reminders to display for this patient.      Pulmonary Function Tests  FEV1: amount of air you can blow out in 1 second  FVC: total amount of air you can take in and blow out    Your Goals:         PFT Latest Ref Rng & Units 2/9/2018   FVC L 3.78   FEV1 L 2.52   FVC% % 79   FEV1% % 67          Airway Clearance: The Most Important Way to Keep Your Lungs Healthy  Vest Settings:    Hill-Rom Frequencies: 8, 9, 10 Pressure 10 Then, Frequencies 18, 19, 20 Pressure 6      RespirTech: Quick Start with Pressure of     Do each frequency  for 5 minutes; Deflate vest after each frequency & cough 3 times before beginning the next setting.    Vest and Neb Therapy should be done 2 times/day.    Good Nutrition Can Improve Lung Function and Overall Health     Take ALL of your vitamins with food     Take 1/2 of your enzymes before EVERY meal/snack and the other 1/2 mid-meal/snack    Wt Readings from Last 3 Encounters:   02/09/18 72.3 kg (159 lb 4.8 oz)   02/09/18 72.3 kg (159 lb 4.8 oz)   12/22/17 71.8 kg (158 lb 4.6 oz)       Body mass index is 23.59 kg/(m^2).         National CF Foundation Recommendations for BMI in CF Adults: Women: at least 22 Men: at least 23        Controlling Blood Sugars Helps Prevent Lung Infections & Improves Nutrition  Test blood sugar:     In the morning before eating (goal is )     2 hours after a meal (goal is less than 150)     When pre-meal glucose is greater than 150 add correction     At bedtime (if less than 100 eat a snack with 15 grams of carbohydrates  Last A1C Results:   Hemoglobin A1C   Date Value Ref Range Status   02/09/2018 6.3 (H) 4.3 - 6.0 % Final         If diabetic, measure A1C every 6 months. Goal: Under 7%    Staying Healthy    Research:  If you are interested in learning about research opportunities or have questions, please contact Jessenia Dalton at [phone] or [email or web address].      CF Foundation:  Compass is a personalized resource service to help you with the insurance, financial, legal and other issues you are facing.  It's free, confidential and available to anyone with CF.  Ask your  for more information or contact Compass directly at [phone]-COMPASS ([phone]) or compass@cff.org, or learn more at cff.org/compass.

## 2018-02-09 NOTE — PROGRESS NOTES
"Respiratory Therapist Note:    Vest    Brand: Hill-Rom - Model 105   Settings: Reqlut Rom: Frequencies 8, 9, 10 at pressure 10 then frequencies 18, 19, 20 at pressure 6.   Cough Pause: No   Vest Garment Size: Adult Medium   Last Fitting Date: due as needed   Frequency of therapy: 0-1 times per day (averaged 5 vests per week this past month, zero during week long travel to [place])   Concerns: patient was challenged by provider to vest 2 x daily for 1 month, he reports doing on average of 5 per week, and none in [place]. He reports to me it makes no difference in his sputum production. I explained this is not the only measure, he is not concerned because of the low amount of sputum he produces, he also reports it makes no difference in how he feels or his energy level.    Alternative Airway Clearance: he runs for 30 minutes 2 x week, and uses caryl coughing regularly- he reports to me that this is as effective as the nebs and vest.     Nebulized Medications   Bronchodilators: Albuterol   Mucolytic: 7% Hypertonic Saline   Antibiotics: they caused him \"inflammation\" per his report.   Additional Inhaled Medications: MDI- albuterol    Review Cleaning: No    Education and Transition Information   Correct order of inhaled medications: Yes   Mechanism of Action of inhaled medications: Yes   Frequency of inhaled medications: Yes   Dosage of inhaled medications: Yes   Other: With the last month challenge he was using the albuterol MDI and 7% nebs  More often up to 10 x per week. He will do this because he can do the neb in his car. He has not told any co-workers he has CF. I introduced the idea of the monarch vest for his travel (he travels for work about 6 weeks/ year). He told me he was not interested at this time to do therapy when traveling.     Home Care   Nebulizer Compressor    Year Purchased: unknown   Home Care Company:     -unknown.        Other Comments: Patient maintains that vest therapy has not made a " difference for him, despite teams interventions.     Goals   Next Visit: encourage exercise and any airway clearance.   Next Year: Daily nebulizer and vest therapy on a regular basis, as tolerated, plan for airway clearance during travel as patient is willing to apply.

## 2018-02-09 NOTE — MR AVS SNAPSHOT
After Visit Summary   2/9/2018    Michele Altamirano    MRN: [id number]           Patient Information     Date Of Birth          1965        Visit Information        Provider Department      2/9/2018 8:00 AM [facility];  [facility]        Today's Diagnoses     CF (cystic fibrosis) (H)    -  1    Cystic fibrosis of the lung (H)        Abnormal glucose tolerance test        Vitamin D deficiency        Screening for diabetes mellitus (DM)        Pancreatic insufficiency        Cystic fibrosis          Care Instructions    Dear Michele Altamirano    Thank you for choosing [facility] ([facility]) for your GTT test.    We look forward in seeing you on your next appointment here at [facility].  Please don t hesitate to call us at [phone] to reschedule any of your appointments or to speak with one of the [facility] registered nurses.  It was a pleasure taking care of you today.    Sincerely,    [facility]  [facility]  [address] [place] [address]  [id number]  Phone:  [phone]            Follow-ups after your visit        Who to contact     If you have questions or need follow up information about today's clinic visit or your schedule please contact [facility] directly at [phone].  Normal or non-critical lab and imaging results will be communicated to you by MyChart, letter or phone within 4 business days after the clinic has received the results. If you do not hear from us within 7 days, please contact the clinic through MyChart or phone. If you have a critical or abnormal lab result, we will notify you by phone as soon as possible.  Submit refill requests through [employer] or call your pharmacy and they will forward the refill request to us. Please allow 3 business days for your refill to be  completed.          Additional Information About Your Visit        Tr[employer] Information     [employer] gives you secure access to your electronic health record. If you see a primary care provider, you can also send messages to your care team and make appointments. If you have questions, please call your primary care clinic.  If you do not have a primary care provider, please call [phone] and they will assist you.        Care EveryWhere ID     This is your Care EveryWhere ID. This could be used by other organizations to access your [place] medical records  [id number]        Your Vitals Were     Pulse Temperature Respirations Pulse Oximetry BMI (Body Mass Index)       62 96  F (35.6  C) (Oral) 14 98% 23.59 kg/m2        Blood Pressure from Last 3 Encounters:   02/09/18 131/84   02/09/18 131/84   12/22/17 155/88    Weight from Last 3 Encounters:   02/09/18 72.3 kg (159 lb 4.8 oz)   02/09/18 72.3 kg (159 lb 4.8 oz)   12/22/17 71.8 kg (158 lb 4.6 oz)              We Performed the Following     Albumin Random Urine Quantitative with Creat Ratio     Basic metabolic panel     CBC with platelets differential     Erythrocyte sedimentation rate auto     GGT     Glucose by meter     Glucose in a Series: Draw +120 minutes     Glucose in a Series: Draw +30 minutes     Glucose in a Series: Draw +60 minutes     Glucose in a Series: Draw +90 minutes     Glucose in a Series: Draw Time Zero     Hemoglobin A1c     Hepatic panel     IgE     IgG     INR     Insulin in a Series: Draw +120 minutes     Insulin in a Series: Draw +30 minutes     Insulin in a Series: Draw +60 minutes     Insulin in a Series: Draw +90 minutes     Insulin in a Series: Draw Time Zero     Iron     Lipid Profile     Magnesium     Phosphorus     Routine UA with microscopic     Testosterone total     TSH with free T4 reflex     Vitamin A     Vitamin D Deficiency     Vitamin E          Today's Medication Changes          These changes are accurate as of 2/9/18  12:31 PM.  If you have any questions, ask your nurse or doctor.               These medicines have changed or have updated prescriptions.        Dose/Directions    * albuterol 108 (90 BASE) MCG/ACT Inhaler   Commonly known as:  PROAIR HFA/PROVENTIL HFA/VENTOLIN HFA   This may have changed:    - when to take this  - additional instructions   Used for:  CF (cystic fibrosis) (H), Bronchiectasis without acute exacerbation (H)        Dose:  2 puff   Inhale 2 puffs into the lungs every 4 hours as needed for shortness of breath / dyspnea or wheezing   Quantity:  3 Inhaler   Refills:  3       * albuterol (2.5 MG/3ML) 0.083% neb solution   This may have changed:  Another medication with the same name was changed. Make sure you understand how and when to take each.   Used for:  CF (cystic fibrosis) (H), Cystic fibrosis with pulmonary manifestations (H), Gastroesophageal reflux disease without esophagitis, Pancreatic insufficiency, Vitamin D deficiency, Abnormal glucose tolerance test, Screening for diabetes mellitus (DM), Screening for hyperlipidemia, Lipid screening        Dose:  2.5 mg   Take 1 vial (2.5 mg) by nebulization 2 times daily   Quantity:  180 mL   Refills:  11       * Notice:  This list has 2 medication(s) that are the same as other medications prescribed for you. Read the directions carefully, and ask your doctor or other care provider to review them with you.             Primary Care Provider Office Phone # Fax #    Dorene Mireles [phone]       [facility] [address] [place] [id number]        Equal Access to Services     POLLY CHAU : Bridger martin Sogurjit, waraeganda laurel, qaybta kaalsamantha machado. So [place] [phone].    ATENCIÓN: Si habla español, tiene a babcock disposición servicios gratuitos de asistencia lingüística. Llame al [phone].    We comply with applicable federal civil rights laws and [place] laws. We do not  discriminate on the basis of race, color, national origin, age, disability, sex, sexual orientation, or gender identity.            Thank you!     Thank you for choosing [facility]  for your care. Our goal is always to provide you with excellent care. Hearing back from our patients is one way we can continue to improve our services. Please take a few minutes to complete the written survey that you may receive in the mail after your visit with us. Thank you!             Your Updated Medication List - Protect others around you: Learn how to safely use, store and throw away your medicines at www.disposemymeds.org.          This list is accurate as of 2/9/18 12:31 PM.  Always use your most recent med list.                   Brand Name Dispense Instructions for use Diagnosis    * albuterol 108 (90 BASE) MCG/ACT Inhaler    PROAIR HFA/PROVENTIL HFA/VENTOLIN HFA    3 Inhaler    Inhale 2 puffs into the lungs every 4 hours as needed for shortness of breath / dyspnea or wheezing    CF (cystic fibrosis) (H), Bronchiectasis without acute exacerbation (H)       * albuterol (2.5 MG/3ML) 0.083% neb solution     180 mL    Take 1 vial (2.5 mg) by nebulization 2 times daily    CF (cystic fibrosis) (H), Cystic fibrosis with pulmonary manifestations (H), Gastroesophageal reflux disease without esophagitis, Pancreatic insufficiency, Vitamin D deficiency, Abnormal glucose tolerance test, Screening for diabetes mellitus (DM), Screening for hyperlipidemia, Lipid screening       azithromycin 500 MG tablet    ZITHROMAX    39 tablet    Take 1 tablet (500 mg) by mouth Every Mon, Wed, Fri Morning    Cystic fibrosis with pulmonary manifestations (H), Gastroesophageal reflux disease without esophagitis, Pancreatic insufficiency, Vitamin D deficiency       aztreonam lysine 75 MG Solr    [facility]    84 mL    Take 1 mL (75 mg) by nebulization 3 times daily Use 28 days on, 28 days off.    CF (cystic  fibrosis) (H)       dornase alpha 1 MG/ML neb solution    PULMOZYME    75 mL    Inhale 2.5 mg into the lungs daily    Cystic fibrosis (H)       lumacaftor-ivacaftor 200-125 MG tablet    ORKAMBI    336 tablet    TAKE 2 TABLETS BY MOUTH EVERY 12 HOURS WITH FAT CONTAINING FOOD. STOREAT ROOM TEMPERATURE.    CF (cystic fibrosis) (H)       multivitamin CF formula chewable tablet     60 tablet    Take 2 tablets by mouth daily    Cystic fibrosis with pulmonary manifestations (H), Gastroesophageal reflux disease without esophagitis, Pancreatic insufficiency, Vitamin D deficiency       oseltamivir 75 MG capsule    TAMIFLU    10 capsule    Take 1 capsule (75 mg) by mouth 2 times daily    CF (cystic fibrosis) (H), Cystic fibrosis with pulmonary manifestations (H), Gastroesophageal reflux disease without esophagitis, Pancreatic insufficiency, Vitamin D deficiency, Abnormal glucose tolerance test, Screening for diabetes mellitus (DM), Screening for hyperlipidemia, Lipid screening       pantoprazole 40 MG EC tablet    PROTONIX    180 tablet    Take 1 tablet (40 mg) by mouth 2 times daily    Gastroesophageal reflux disease without esophagitis, Cystic fibrosis with pulmonary manifestations (H), Pancreatic insufficiency, Vitamin D deficiency       EDU ALTERA NEBULIZER HANDSET Misc     1 each    1 Device 3 times daily 28 days on, 28 days off.    Cystic fibrosis with pulmonary manifestations (H)       EDU ALTERA NEBULIZER SYSTEM Misc     1 each    1 Units 3 times daily 28 days on, 28 days off.    Cystic fibrosis with pulmonary manifestations (H)       ranitidine 150 MG tablet    ZANTAC    60 tablet    Take 1 tablet (150 mg) by mouth 2 times daily    Cystic fibrosis with pulmonary manifestations (H), Gastroesophageal reflux disease without esophagitis, Pancreatic insufficiency, Vitamin D deficiency       sodium chloride 0.65 % nasal spray    OCEAN    88 mL    Spray 2 sprays into both nostrils every 2 hours (while awake)    Chronic  pansinusitis       sodium chloride inhalant 7 % Nebu neb solution     720 mL    Take 4 mLs by nebulization daily    Cystic fibrosis with pulmonary manifestations (H), Gastroesophageal reflux disease without esophagitis, Pancreatic insufficiency, Vitamin D deficiency, CF (cystic fibrosis) (H), Abnormal glucose tolerance test, Screening for diabetes mellitus (DM), Screening for hyperlipidemia, Lipid screening       vitamin D 2000 UNITS Caps     60 capsule    Take 2 capsules by mouth daily    Cystic fibrosis with pulmonary manifestations (H), Gastroesophageal reflux disease without esophagitis, Pancreatic insufficiency, Vitamin D deficiency       ZENPEP 68459 UNITS Cpep   Generic drug:  amylase-lipase-protease     3240 capsule    Take 6 with meals and 3 with snacks. (3 meals and 3 snacks per day)    Cystic fibrosis with pulmonary manifestations (H), Gastroesophageal reflux disease without esophagitis, Pancreatic insufficiency, Vitamin D deficiency, CF (cystic fibrosis) (H), Abnormal glucose tolerance test, Screening for diabetes mellitus (DM), Screening for hyperlipidemia, Lipid screening       * Notice:  This list has 2 medication(s) that are the same as other medications prescribed for you. Read the directions carefully, and ask your doctor or other care provider to review them with you.

## 2018-02-09 NOTE — PROGRESS NOTES
Nursing Note  Michele Altamirano [facility] for:   Chief Complaint   Patient presents with     Eval/Assessment     GTT     During today's [facility] appointment, orders from Dr.Jordan Tim Meyer were completed.  Frequency: once GTT test    Progress note:  Patient identification verified by name and date of birth.  Assessment completed.  Vitals recorded in Doc Flowsheets.  Patient was provided with education regarding infusion and possible side effects.  Patient verbalized understanding.      needed: No   Michele has been NPO.  Premedications: were not ordered.  Base line 0840  30 minutes  0910.  60 minutes  0940  90 minutes   1010  120 minutes  1040     Blood Glucose was 203    Orange juice was given to Michele he was not feeling light headed or dizzy.    Weight was 159.3 lbs  Approximate GTT length:2 hours.   Labs: were drawn per orders.   Vascular access: peripheral IV placed today.  Treatment Conditions: patient denies fever, chills, signs of infection, recent illness, on antibiotics, productive cough or elevated temperature.  Patient tolerated GTT test: well.    Discharge Plan:   Follow up plan of care with:   Discharge instructions were reviewed with patient.  Patient/representative verbalized understanding of discharge instructions and all questions answered.  Patient discharged [facility] in stable condition.    Yaz Perez RN    Administrations This Visit     glucose tolerence test (GLUCOLA) 25% oral liquid 75 g     Admin Date Action Dose Route Administered By             02/09/2018 Given 75 g Oral Yaz Perez RN                          /84  Pulse 62  Temp 96  F (35.6  C) (Oral)  Resp 14  Wt 72.3 kg (159 lb 4.8 oz)  SpO2 98%  BMI 23.59 kg/m2

## 2018-02-09 NOTE — NURSING NOTE
Chief Complaint   Patient presents with     Cystic Fibrosis     Patient is being seen for CF follow up      Lexi Damon CMA at 11:53 AM on 2/9/2018

## 2018-02-09 NOTE — PROGRESS NOTES
"Reason for Visit  Michele Altamirano is a 52 year old year old male who is being seen for Cystic Fibrosis (Patient is being seen for CF follow up)    Assessment and plan:   Michele Altamirano is a 52-year-old male with cystic fibrosis with moderate obstructive lung disease, pancreatic insufficiency, CF related sinus disease of the history of fungal sinusitis and glucose intolerance.    1. Pulmonary: The patient reports very good exercise tolerance. He is oxygenating well. PFTs are modestly increased.  His baseline appears to be highly variable but current values are in his usual range although not at his best.  He is feeling well and does not appear to be having an exacerbation. He did increase vesting to once daily for 3 weeks but did not notice a significant benefit. Currently he has not vested for about a week and does not appear to be interested in ongoing formal airway clearance . I have encouraged him to continue regular exercise for airway clearance interestingly, he reports his best sputum production following surgery.  This may be related to \"airway relaxation\" so I suggested he consider increasing yoga or massage. The patient prefers hypertonic saline to Pulmozyme or Mucomyst so he will continue with twice daily hypertonic saline.  Continue three-time weekly azithromycin. Could consider a trial of [place] with his next visit which could be used every other month. As below, will plan to initiate Tezacaftor at his follow-up in 2 months.     2. Pancreatic insufficiency: Patient denies symptoms of malabsorption. His weight has been variable over the past few years but is currently in an adequate range. Body mass index is 23.59 kg/(m^2). He will continue his current vitamins and enzymes.    3. Abnormal glucose tolerance: The patient has a history of hyperglycemia. Hemoglobin A1c was 6.3 % and OGTT was consistent with CFRD, with an elevated 2 hour BG level of 255. He is not receiving insulin at this time and is not interested in " initiating it in the immediate future.    4. Cystic fibrosis sinus disease/fungal sinusitis:  Quiescent at this time.    5. Abdominal bloating:  Adequately controlled with current pancreatic enzymes, proton pump inhibitor and H2 blocker.    6. 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  He is currently receiving orkambi but has developed hypertension so a switch to Tezacaftor/ivacaftor should be considered once it becomes available.     7. Healthcare maintenance: Annual studies were completed today and reviewed with the patient. Hemoglobin A1c and fasting blood glucose level were both mildly elevated. Additionally, 2 hr blood glucose level during OGTT was 255, consistent with CFRD (see above) . CXR was reviewed with the patient  and stable from 2014 imaging - CF-related markings appar most prominent in the right upper lung field . DEXA scan was reviewed with the patient - overall bone density has slightly decreased however remains in an excellent range. I encouraged him to continue regular exercise. The patient received an influenza vaccine through his local clinic for this flu season. The remainder of annual studies available at the time of the appointment were reviewed and were unremarkable.    Patient will be seen in follow-up in two months with PFTs, a sputum culture and LFTs. Will plan to initiate Tezacafto/ivacaftor  at that yomaira if it is available.      40 minutes face to face time with the patient, more than half spent in counseling and coordination of care regarding options for reducing chest congestion and maintaining stable lung function.  CF HPI  The patient was seen and examined by Ziyad Meyer   Michele Altamirano is a 52-year-old male with cystic fibrosis with moderate obstructive lung disease, pancreatic insufficiency, glucose intolerance and history of fungal sinusitis.  Today, he is feeling at his recent baseline. Breathing is comfortable at rest and with all usual activity. He does yoga  once a week. He uses saline nebs BID and discontinued pulmozyme as he did not notice a difference between HTS and pulmozyme. He was vesting for 2-3 weeks in January -about 5 times per week.  he has been traveling for work and has not done any vest therapy the past week.. When vesting, he produces 1-2 teaspoons of sputum. With vigorous exercise, he produces a bit more sputum. He noted significant sputum production after his hernia repair and sinus surgery and a similarly large amount of sputum production when taking, in combination, guaifenesin, ephedrine and 1200mg ibuprofen. His sputum is yellow or green without hemoptysis. No recent change in sputum color or volume. The patient denies CP, fever or chills. He only experiences night sweats if he eats a large amount of carbs late at night, which is related to his blood sugar.    Review of Systems:  Appetite is good.  No ear pain, sore throat, sinus pain or rhinorrhea.  Endocrine: He does not check insulin levels very frequently. Occasional episodes of hypoglycemia - generally is symptomatic in the 60s. Generally sees 60s blood sugar if he catches it, down into the 40s when working and not checking it  No nausea, vomiting or diarrhea.  Chronic muscular pain of the neck and shoulders.  A complete ROS was otherwise negative except as noted in the HPI.    Current Outpatient Prescriptions   Medication     oseltamivir (TAMIFLU) 75 MG capsule     sodium chloride inhalant 7 % NEBU neb solution     ZENPEP 43085 UNITS CPEP per EC capsule     albuterol (2.5 MG/3ML) 0.083% neb solution     aztreonam lysine (CAYSTON) 75 MG SOLR     Respiratory Therapy Supplies (EDU ALTERA NEBULIZER HANDSET) MISC     EDU ALTERA NEBULIZER SYSTEM MISC     pantoprazole (PROTONIX) 40 MG EC tablet     lumacaftor-ivacaftor (ORKAMBI) 200-125 MG tablet     azithromycin (ZITHROMAX) 500 MG tablet     dornase alpha (PULMOZYME) 1 MG/ML neb solution     sodium chloride (OCEAN) 0.65 % nasal spray      ranitidine (ZANTAC) 150 MG tablet     albuterol (PROAIR HFA, PROVENTIL HFA, VENTOLIN HFA) 108 (90 BASE) MCG/ACT inhaler     Cholecalciferol (VITAMIN D) 2000 UNITS CAPS     multivitamin CF formula (AQUADEKS) chewable tablet     No current facility-administered medications for this visit.      No Known Allergies  Past Medical History:   Diagnosis Date     CAP (community acquired pneumonia)     2011     CF (cystic fibrosis) (H)     diagnosed 1969, malnutrition, staph empyema     Chronic knee pain      GERD (gastroesophageal reflux disease)     egd in the past     Pancreatic insufficiency        Past Surgical History:   Procedure Laterality Date     ENDOSCOPIC SINUS SURGERY      removal of mucocele behind eye, 1992     OPTICAL TRACKING SYSTEM ENDOSCOPIC SINUS SURGERY Bilateral 11/28/2016    Procedure: OPTICAL TRACKING SYSTEM ENDOSCOPIC SINUS SURGERY;  Surgeon: Harriett Cosby MD;  Location:  OR       Social History     Social History     Marital status:      Spouse name: N/A     Number of children: 1     Years of education: N/A     Occupational History           Social History Main Topics     Smoking status: Never Smoker     Smokeless tobacco: Never Used     Alcohol use No     Drug use: No     Sexual activity: Yes     Partners: Female     Birth control/ protection: None     Other Topics Concern     Not on file     Social History Narrative    Nov 2015--Lives in [place] with his wife and 10 yo son.  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         /84  Pulse 62  Temp 96  F (35.6  C) (Oral)  Resp 14  Wt 72.3 kg (159 lb 4.8 oz)  SpO2 98%  BMI 23.59 kg/m2  Body mass index is 23.59 kg/(m^2).  Exam:   GENERAL APPEARANCE: Well developed, well nourished, alert, and in no apparent distress.  EYES: PERRL, EOMI  HENT: Nasal mucosa with edema and no hyperemia. No nasal polyps.  EARS: Canals clear, TMs normal  MOUTH: Oral mucosa is moist,  without any lesions, no tonsillar enlargement, no oropharyngeal exudate.  NECK: supple, no masses, no thyromegaly.  LYMPHATICS: No significant axillary, cervical, or supraclavicular nodes.  RESP: normal percussion, good air flow throughout.  Bilat upper lung crackles. No rhonchi. No wheezes.  CV: Normal S1, S2, regular rhythm, normal rate. No murmur.  No rub. No gallop. No LE edema.   ABDOMEN:  Bowel sounds normal, soft, nontender, no SM or masses. Liver edge palpable at CM with deep inspiration.  MS: extremities normal. No clubbing. No cyanosis.  SKIN: no rash on limited exam  NEURO: Mentation intact, speech normal, normal strength and tone, normal gait and stance  PSYCH: mentation appears normal. and affect normal/bright  Results:  Recent Results (from the past 168 hour(s))   Hepatic panel    Collection Time: 02/09/18  8:13 AM   Result Value Ref Range    Bilirubin Direct <0.1 0.0 - 0.2 mg/dL    Bilirubin Total 0.4 0.2 - 1.3 mg/dL    Albumin 4.2 3.4 - 5.0 g/dL    Protein Total 8.0 6.8 - 8.8 g/dL    Alkaline Phosphatase 65 40 - 150 U/L    ALT 36 0 - 70 U/L    AST 35 0 - 45 U/L   Basic metabolic panel    Collection Time: 02/09/18  8:13 AM   Result Value Ref Range    Sodium 137 133 - 144 mmol/L    Potassium 3.4 3.4 - 5.3 mmol/L    Chloride 101 94 - 109 mmol/L    Carbon Dioxide 28 20 - 32 mmol/L    Anion Gap 8 3 - 14 mmol/L    Glucose 105 (H) 70 - 99 mg/dL    Urea Nitrogen 15 7 - 30 mg/dL    Creatinine 0.82 0.66 - 1.25 mg/dL    GFR Estimate >90 >60 mL/min/1.7m2    GFR Estimate If Black >90 >60 mL/min/1.7m2    Calcium 8.6 8.5 - 10.1 mg/dL   Lipid Profile    Collection Time: 02/09/18  8:13 AM   Result Value Ref Range    Cholesterol 164 <200 mg/dL    Triglycerides 78 <150 mg/dL    HDL Cholesterol 68 >39 mg/dL    LDL Cholesterol Calculated 80 <100 mg/dL    Non HDL Cholesterol 96 <130 mg/dL   Iron    Collection Time: 02/09/18  8:13 AM   Result Value Ref Range    Iron 132 35 - 180 ug/dL   GGT    Collection Time: 02/09/18   8:13 AM   Result Value Ref Range    GGT 62 0 - 75 U/L   Hemoglobin A1c    Collection Time: 02/09/18  8:13 AM   Result Value Ref Range    Hemoglobin A1C 6.3 (H) 4.3 - 6.0 %   INR    Collection Time: 02/09/18  8:13 AM   Result Value Ref Range    INR 0.89 0.86 - 1.14   Magnesium    Collection Time: 02/09/18  8:13 AM   Result Value Ref Range    Magnesium 2.1 1.6 - 2.3 mg/dL   Phosphorus    Collection Time: 02/09/18  8:13 AM   Result Value Ref Range    Phosphorus 2.8 2.5 - 4.5 mg/dL   TSH with free T4 reflex    Collection Time: 02/09/18  8:13 AM   Result Value Ref Range    TSH 2.23 0.40 - 4.00 mU/L   CBC with platelets differential    Collection Time: 02/09/18  8:13 AM   Result Value Ref Range    WBC 4.3 4.0 - 11.0 10e9/L    RBC Count 5.21 4.4 - 5.9 10e12/L    Hemoglobin 15.4 13.3 - 17.7 g/dL    Hematocrit 45.3 40.0 - 53.0 %    MCV 87 78 - 100 fl    MCH 29.6 26.5 - 33.0 pg    MCHC 34.0 31.5 - 36.5 g/dL    RDW 13.2 10.0 - 15.0 %    Platelet Count 246 150 - 450 10e9/L    Diff Method Automated Method     % Neutrophils 42.8 %    % Lymphocytes 42.0 %    % Monocytes 11.3 %    % Eosinophils 2.5 %    % Basophils 1.2 %    % Immature Granulocytes 0.2 %    Nucleated RBCs 0 0 /100    Absolute Neutrophil 1.9 1.6 - 8.3 10e9/L    Absolute Lymphocytes 1.8 0.8 - 5.3 10e9/L    Absolute Monocytes 0.5 0.0 - 1.3 10e9/L    Absolute Eosinophils 0.1 0.0 - 0.7 10e9/L    Absolute Basophils 0.1 0.0 - 0.2 10e9/L    Abs Immature Granulocytes 0.0 0 - 0.4 10e9/L    Absolute Nucleated RBC 0.0    Erythrocyte sedimentation rate auto    Collection Time: 02/09/18  8:13 AM   Result Value Ref Range    Sed Rate 7 0 - 20 mm/h   Glucose in a Series: Draw Time Zero    Collection Time: 02/09/18  8:36 AM   Result Value Ref Range    Glucose 104 (H) 70 - 99 mg/dL   Insulin in a Series: Draw Time Zero    Collection Time: 02/09/18  8:36 AM   Result Value Ref Range    Insulin 4.4 3 - 25 mU/L   Glucose in a Series: Draw +30 minutes    Collection Time: 02/09/18  9:10 AM    Result Value Ref Range    Glucose 146 (H) 70 - 99 mg/dL   Glucose in a Series: Draw +60 minutes    Collection Time: 02/09/18  9:40 AM   Result Value Ref Range    Glucose 221 (H) 70 - 99 mg/dL   Glucose in a Series: Draw +90 minutes    Collection Time: 02/09/18 10:10 AM   Result Value Ref Range    Glucose 255 (H) 70 - 99 mg/dL   Glucose in a Series: Draw +120 minutes    Collection Time: 02/09/18 10:40 AM   Result Value Ref Range    Glucose 203 (H) 70 - 99 mg/dL   Glucose by meter    Collection Time: 02/09/18 10:55 AM   Result Value Ref Range    Glucose 203 (H) 70 - 99 mg/dL   Routine UA with microscopic    Collection Time: 02/09/18 11:00 AM   Result Value Ref Range    Color Urine Yellow     Appearance Urine Clear     Glucose Urine 50 (A) NEG^Negative mg/dL    Bilirubin Urine Negative NEG^Negative    Ketones Urine Negative NEG^Negative mg/dL    Specific Gravity Urine 1.013 1.003 - 1.035    Blood Urine Negative NEG^Negative    pH Urine 6.0 5.0 - 7.0 pH    Protein Albumin Urine Negative NEG^Negative mg/dL    Urobilinogen mg/dL 0.0 0.0 - 2.0 mg/dL    Nitrite Urine Negative NEG^Negative    Leukocyte Esterase Urine Negative NEG^Negative    Source Midstream Urine     WBC Urine 0 0 - 2 /HPF    RBC Urine 1 0 - 2 /HPF    Mucous Urine Present (A) NEG^Negative /LPF   General PFT Lab (Please always keep checked)    Collection Time: 02/09/18 11:39 AM   Result Value Ref Range    FVC-Pred 4.76 L    FVC-Pre 3.78 L    FVC-%Pred-Pre 79 %    FEV1-Pre 2.52 L    FEV1-%Pred-Pre 67 %    FEV1FVC-Pred 79 %    FEV1FVC-Pre 67 %    FEFMax-Pred 9.52 L/sec    FEFMax-Pre 7.23 L/sec    FEFMax-%Pred-Pre 75 %    FEF2575-Pred 3.41 L/sec    FEF2575-Pre 1.36 L/sec    HIP8488-%Pred-Pre 40 %    ExpTime-Pre 13.37 sec    FIFMax-Pre 6.77 L/sec    FEV1FEV6-Pred 80 %    FEV1FEV6-Pre 70 %                 Results as noted above.    PFT Interpretation:  Moderate obstructive ventilatory defect.  Increased from previous.  Below recent best.   Valid  Maneuver          CF Exacerbation:  absent      Scribe Disclosure:   I, Dhaval Orozco, am serving as a scribe; to document services personally performed by Ziyad Meyer MD based on data collection and the provider's statements to me.     Provider Disclosure:  I agree with above History, Review of Systems, Physical exam and Plan.  I have reviewed the content of the documentation and have edited it as needed. I have personally performed the services documented here and the documentation accurately represents those services and the decisions I have made.      Electronically signed by:  Ziyad Meyer

## 2018-02-09 NOTE — PATIENT INSTRUCTIONS
Dear Michele Altamirano    Thank you for choosing [facility] ([facility]) for your GTT test.    We look forward in seeing you on your next appointment here at [facility].  Please don t hesitate to call us at [phone] to reschedule any of your appointments or to speak with one of the [facility] registered nurses.  It was a pleasure taking care of you today.    Sincerely,    [facility]  [address] [place] [address]  [id number]  Phone:  [phone]

## 2018-02-09 NOTE — LETTER
"2/9/2018       RE: Michele Altamirano  [address] [facility] [address]     Dear Colleague,    Thank you for referring your patient, Michele Altamirano, to the [facility] LUNG SCIENCE [facility] at [facility]. Please see a copy of my visit note below.    Reason for Visit  Michele Altamirano is a 52 year old year old male who is being seen for Cystic Fibrosis (Patient is being seen for CF follow up)    Assessment and plan:   Michele Altamirano is a 52-year-old male with cystic fibrosis with moderate obstructive lung disease, pancreatic insufficiency, CF related sinus disease of the history of fungal sinusitis and glucose intolerance.    1. Pulmonary: The patient reports very good exercise tolerance. He is oxygenating well. PFTs are modestly increased.  His baseline appears to be highly variable but current values are in his usual range although not at his best.  He is feeling well and does not appear to be having an exacerbation. He did increase vesting to once daily for 3 weeks but did not notice a significant benefit. Currently he has not vested for about a week and does not appear to be interested in ongoing formal airway clearance . I have encouraged him to continue regular exercise for airway clearance interestingly, he reports his best sputum production following surgery.  This may be related to \"airway relaxation\" so I suggested he consider increasing yoga or massage. The patient prefers hypertonic saline to Pulmozyme or Mucomyst so he will continue with twice daily hypertonic saline.  Continue three-time weekly azithromycin. Could consider a trial of [place] with his next visit which could be used every other month. As below, will plan to initiate Tezacaftor at his follow-up in 2 months.     2. Pancreatic insufficiency: Patient denies symptoms of malabsorption. His weight has been variable over the past few years but is currently in an adequate range. Body mass index is 23.59 kg/(m^2). He will continue " his current vitamins and enzymes.    3. Abnormal glucose tolerance: The patient has a history of hyperglycemia. Hemoglobin A1c was 6.3 % and OGTT was consistent with CFRD, with an elevated 2 hour BG level of 255. He is not receiving insulin at this time and is not interested in initiating it in the immediate future.    4. Cystic fibrosis sinus disease/fungal sinusitis:  Quiescent at this time.    5. Abdominal bloating:  Adequately controlled with current pancreatic enzymes, proton pump inhibitor and H2 blocker.    6. 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  He is currently receiving orkambi but has developed hypertension so a switch to Tezacaftor/ivacaftor should be considered once it becomes available.     7. Healthcare maintenance: Annual studies were completed today and reviewed with the patient. Hemoglobin A1c and fasting blood glucose level were both mildly elevated. Additionally, 2 hr blood glucose level during OGTT was 255, consistent with CFRD (see above) . CXR was reviewed with the patient  and stable from 2014 imaging - CF-related markings appar most prominent in the right upper lung field . DEXA scan was reviewed with the patient - overall bone density has slightly decreased however remains in an excellent range. I encouraged him to continue regular exercise. The patient received an influenza vaccine through his local clinic for this flu season. The remainder of annual studies available at the time of the appointment were reviewed and were unremarkable.    Patient will be seen in follow-up in two months with PFTs, a sputum culture and LFTs. Will plan to initiate Tezacafto/ivacaftor  at that yomaira if it is available.      40 minutes face to face time with the patient, more than half spent in counseling and coordination of care regarding options for reducing chest congestion and maintaining stable lung function.  CF HPI  The patient was seen and examined by Ziyad Altamirano is a  52-year-old male with cystic fibrosis with moderate obstructive lung disease, pancreatic insufficiency, glucose intolerance and history of fungal sinusitis.  Today, he is feeling at his recent baseline. Breathing is comfortable at rest and with all usual activity. He does yoga once a week. He uses saline nebs BID and discontinued pulmozyme as he did not notice a difference between HTS and pulmozyme. He was vesting for 2-3 weeks in January -about 5 times per week.  he has been traveling for work and has not done any vest therapy the past week.. When vesting, he produces 1-2 teaspoons of sputum. With vigorous exercise, he produces a bit more sputum. He noted significant sputum production after his hernia repair and sinus surgery and a similarly large amount of sputum production when taking, in combination, guaifenesin, ephedrine and 1200mg ibuprofen. His sputum is yellow or green without hemoptysis. No recent change in sputum color or volume. The patient denies CP, fever or chills. He only experiences night sweats if he eats a large amount of carbs late at night, which is related to his blood sugar.    Review of Systems:  Appetite is good.  No ear pain, sore throat, sinus pain or rhinorrhea.  Endocrine: He does not check insulin levels very frequently. Occasional episodes of hypoglycemia - generally is symptomatic in the 60s. Generally sees 60s blood sugar if he catches it, down into the 40s when working and not checking it  No nausea, vomiting or diarrhea.  Chronic muscular pain of the neck and shoulders.  A complete ROS was otherwise negative except as noted in the HPI.    Current Outpatient Prescriptions   Medication     oseltamivir (TAMIFLU) 75 MG capsule     sodium chloride inhalant 7 % NEBU neb solution     ZENPEP 93594 UNITS CPEP per EC capsule     albuterol (2.5 MG/3ML) 0.083% neb solution     aztreonam lysine (CAYSTON) 75 MG SOLR     Respiratory Therapy Supplies (EDU ALTERA NEBULIZER HANDSET) MISC     EDU  ALTERA NEBULIZER SYSTEM MISC     pantoprazole (PROTONIX) 40 MG EC tablet     lumacaftor-ivacaftor (ORKAMBI) 200-125 MG tablet     azithromycin (ZITHROMAX) 500 MG tablet     dornase alpha (PULMOZYME) 1 MG/ML neb solution     sodium chloride (OCEAN) 0.65 % nasal spray     ranitidine (ZANTAC) 150 MG tablet     albuterol (PROAIR HFA, PROVENTIL HFA, VENTOLIN HFA) 108 (90 BASE) MCG/ACT inhaler     Cholecalciferol (VITAMIN D) 2000 UNITS CAPS     multivitamin CF formula (AQUADEKS) chewable tablet     No current facility-administered medications for this visit.      No Known Allergies  Past Medical History:   Diagnosis Date     CAP (community acquired pneumonia)     2011     CF (cystic fibrosis) (H)     diagnosed 1969, malnutrition, staph empyema     Chronic knee pain      GERD (gastroesophageal reflux disease)     egd in the past     Pancreatic insufficiency        Past Surgical History:   Procedure Laterality Date     ENDOSCOPIC SINUS SURGERY      removal of mucocele behind eye, 1992     OPTICAL TRACKING SYSTEM ENDOSCOPIC SINUS SURGERY Bilateral 11/28/2016    Procedure: OPTICAL TRACKING SYSTEM ENDOSCOPIC SINUS SURGERY;  Surgeon: Harriett Cosby MD;  Location:  OR       Social History     Social History     Marital status:      Spouse name: N/A     Number of children: 1     Years of education: N/A     Occupational History           Social History Main Topics     Smoking status: Never Smoker     Smokeless tobacco: Never Used     Alcohol use No     Drug use: No     Sexual activity: Yes     Partners: Female     Birth control/ protection: None     Other Topics Concern     Not on file     Social History Narrative    Nov 2015--Lives in [place] with his wife and 10 yo son.  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         /84  Pulse 62  Temp 96  F (35.6  C) (Oral)  Resp 14  Wt 72.3 kg (159 lb 4.8 oz)  SpO2 98%  BMI 23.59 kg/m2  Body mass  index is 23.59 kg/(m^2).  Exam:   GENERAL APPEARANCE: Well developed, well nourished, alert, and in no apparent distress.  EYES: PERRL, EOMI  HENT: Nasal mucosa with edema and no hyperemia. No nasal polyps.  EARS: Canals clear, TMs normal  MOUTH: Oral mucosa is moist, without any lesions, no tonsillar enlargement, no oropharyngeal exudate.  NECK: supple, no masses, no thyromegaly.  LYMPHATICS: No significant axillary, cervical, or supraclavicular nodes.  RESP: normal percussion, good air flow throughout.  Bilat upper lung crackles. No rhonchi. No wheezes.  CV: Normal S1, S2, regular rhythm, normal rate. No murmur.  No rub. No gallop. No LE edema.   ABDOMEN:  Bowel sounds normal, soft, nontender, no SM or masses. Liver edge palpable at CM with deep inspiration.  MS: extremities normal. No clubbing. No cyanosis.  SKIN: no rash on limited exam  NEURO: Mentation intact, speech normal, normal strength and tone, normal gait and stance  PSYCH: mentation appears normal. and affect normal/bright  Results:  Recent Results (from the past 168 hour(s))   Hepatic panel    Collection Time: 02/09/18  8:13 AM   Result Value Ref Range    Bilirubin Direct <0.1 0.0 - 0.2 mg/dL    Bilirubin Total 0.4 0.2 - 1.3 mg/dL    Albumin 4.2 3.4 - 5.0 g/dL    Protein Total 8.0 6.8 - 8.8 g/dL    Alkaline Phosphatase 65 40 - 150 U/L    ALT 36 0 - 70 U/L    AST 35 0 - 45 U/L   Basic metabolic panel    Collection Time: 02/09/18  8:13 AM   Result Value Ref Range    Sodium 137 133 - 144 mmol/L    Potassium 3.4 3.4 - 5.3 mmol/L    Chloride 101 94 - 109 mmol/L    Carbon Dioxide 28 20 - 32 mmol/L    Anion Gap 8 3 - 14 mmol/L    Glucose 105 (H) 70 - 99 mg/dL    Urea Nitrogen 15 7 - 30 mg/dL    Creatinine 0.82 0.66 - 1.25 mg/dL    GFR Estimate >90 >60 mL/min/1.7m2    GFR Estimate If Black >90 >60 mL/min/1.7m2    Calcium 8.6 8.5 - 10.1 mg/dL   Lipid Profile    Collection Time: 02/09/18  8:13 AM   Result Value Ref Range    Cholesterol 164 <200 mg/dL     Triglycerides 78 <150 mg/dL    HDL Cholesterol 68 >39 mg/dL    LDL Cholesterol Calculated 80 <100 mg/dL    Non HDL Cholesterol 96 <130 mg/dL   Iron    Collection Time: 02/09/18  8:13 AM   Result Value Ref Range    Iron 132 35 - 180 ug/dL   GGT    Collection Time: 02/09/18  8:13 AM   Result Value Ref Range    GGT 62 0 - 75 U/L   Hemoglobin A1c    Collection Time: 02/09/18  8:13 AM   Result Value Ref Range    Hemoglobin A1C 6.3 (H) 4.3 - 6.0 %   INR    Collection Time: 02/09/18  8:13 AM   Result Value Ref Range    INR 0.89 0.86 - 1.14   Magnesium    Collection Time: 02/09/18  8:13 AM   Result Value Ref Range    Magnesium 2.1 1.6 - 2.3 mg/dL   Phosphorus    Collection Time: 02/09/18  8:13 AM   Result Value Ref Range    Phosphorus 2.8 2.5 - 4.5 mg/dL   TSH with free T4 reflex    Collection Time: 02/09/18  8:13 AM   Result Value Ref Range    TSH 2.23 0.40 - 4.00 mU/L   CBC with platelets differential    Collection Time: 02/09/18  8:13 AM   Result Value Ref Range    WBC 4.3 4.0 - 11.0 10e9/L    RBC Count 5.21 4.4 - 5.9 10e12/L    Hemoglobin 15.4 13.3 - 17.7 g/dL    Hematocrit 45.3 40.0 - 53.0 %    MCV 87 78 - 100 fl    MCH 29.6 26.5 - 33.0 pg    MCHC 34.0 31.5 - 36.5 g/dL    RDW 13.2 10.0 - 15.0 %    Platelet Count 246 150 - 450 10e9/L    Diff Method Automated Method     % Neutrophils 42.8 %    % Lymphocytes 42.0 %    % Monocytes 11.3 %    % Eosinophils 2.5 %    % Basophils 1.2 %    % Immature Granulocytes 0.2 %    Nucleated RBCs 0 0 /100    Absolute Neutrophil 1.9 1.6 - 8.3 10e9/L    Absolute Lymphocytes 1.8 0.8 - 5.3 10e9/L    Absolute Monocytes 0.5 0.0 - 1.3 10e9/L    Absolute Eosinophils 0.1 0.0 - 0.7 10e9/L    Absolute Basophils 0.1 0.0 - 0.2 10e9/L    Abs Immature Granulocytes 0.0 0 - 0.4 10e9/L    Absolute Nucleated RBC 0.0    Erythrocyte sedimentation rate auto    Collection Time: 02/09/18  8:13 AM   Result Value Ref Range    Sed Rate 7 0 - 20 mm/h   Glucose in a Series: Draw Time Zero    Collection Time: 02/09/18   8:36 AM   Result Value Ref Range    Glucose 104 (H) 70 - 99 mg/dL   Insulin in a Series: Draw Time Zero    Collection Time: 02/09/18  8:36 AM   Result Value Ref Range    Insulin 4.4 3 - 25 mU/L   Glucose in a Series: Draw +30 minutes    Collection Time: 02/09/18  9:10 AM   Result Value Ref Range    Glucose 146 (H) 70 - 99 mg/dL   Glucose in a Series: Draw +60 minutes    Collection Time: 02/09/18  9:40 AM   Result Value Ref Range    Glucose 221 (H) 70 - 99 mg/dL   Glucose in a Series: Draw +90 minutes    Collection Time: 02/09/18 10:10 AM   Result Value Ref Range    Glucose 255 (H) 70 - 99 mg/dL   Glucose in a Series: Draw +120 minutes    Collection Time: 02/09/18 10:40 AM   Result Value Ref Range    Glucose 203 (H) 70 - 99 mg/dL   Glucose by meter    Collection Time: 02/09/18 10:55 AM   Result Value Ref Range    Glucose 203 (H) 70 - 99 mg/dL   Routine UA with microscopic    Collection Time: 02/09/18 11:00 AM   Result Value Ref Range    Color Urine Yellow     Appearance Urine Clear     Glucose Urine 50 (A) NEG^Negative mg/dL    Bilirubin Urine Negative NEG^Negative    Ketones Urine Negative NEG^Negative mg/dL    Specific Gravity Urine 1.013 1.003 - 1.035    Blood Urine Negative NEG^Negative    pH Urine 6.0 5.0 - 7.0 pH    Protein Albumin Urine Negative NEG^Negative mg/dL    Urobilinogen mg/dL 0.0 0.0 - 2.0 mg/dL    Nitrite Urine Negative NEG^Negative    Leukocyte Esterase Urine Negative NEG^Negative    Source Midstream Urine     WBC Urine 0 0 - 2 /HPF    RBC Urine 1 0 - 2 /HPF    Mucous Urine Present (A) NEG^Negative /LPF   General PFT Lab (Please always keep checked)    Collection Time: 02/09/18 11:39 AM   Result Value Ref Range    FVC-Pred 4.76 L    FVC-Pre 3.78 L    FVC-%Pred-Pre 79 %    FEV1-Pre 2.52 L    FEV1-%Pred-Pre 67 %    FEV1FVC-Pred 79 %    FEV1FVC-Pre 67 %    FEFMax-Pred 9.52 L/sec    FEFMax-Pre 7.23 L/sec    FEFMax-%Pred-Pre 75 %    FEF2575-Pred 3.41 L/sec    FEF2575-Pre 1.36 L/sec    AWF7890-%Pred-Pre  40 %    ExpTime-Pre 13.37 sec    FIFMax-Pre 6.77 L/sec    FEV1FEV6-Pred 80 %    FEV1FEV6-Pre 70 %       Scribe Disclosure:   I, Dhaval Orozco, am serving as a scribe; to document services personally performed by Ziyad Meyer MD based on data collection and the provider's statements to me.     Provider Disclosure:  I agree with above History, Review of Systems, Physical exam and Plan.  I have reviewed the content of the documentation and have edited it as needed. I have personally performed the services documented here and the documentation accurately represents those services and the decisions I have made.      Electronically signed by:  Ziyad Meyer MD

## 2018-02-09 NOTE — MR AVS SNAPSHOT
After Visit Summary   2/9/2018    Michele Altamirano    MRN: [id number]           Patient Information     Date Of Birth          1965        Visit Information        Provider Department      2/9/2018 12:00 PM Ameena Guerrero RPH [facility] Cystic Fibrosis [facility]        Today's Diagnoses     Cystic fibrosis    -  1    Pancreatic insufficiency           Follow-ups after your visit        Your next 10 appointments already scheduled     May 09, 2018  7:50 AM CDT   (Arrive by 7:35 AM)   RETURN CYSTIC FIBROSIS VISIT with Siobhan Munoz MD   [facility] for [facility] Science and Health ([facility])    [address]  [address] [place] [id number] [phone]              Who to contact     If you have questions or need follow up information about today's clinic visit or your schedule please contact [facility] CYSTIC FIBROSIS [facility] directly at [phone].  Normal or non-critical lab and imaging results will be communicated to you by MyChart, letter or phone within 4 business days after the clinic has received the results. If you do not hear from us within 7 days, please contact the clinic through [employer] or phone. If you have a critical or abnormal lab result, we will notify you by phone as soon as possible.  Submit refill requests through [employer] or call your pharmacy and they will forward the refill request to us. Please allow 3 business days for your refill to be completed.          Additional Information About Your Visit        MyChart Information     [employer] gives you secure access to your electronic health record. If you see a primary care provider, you can also send messages to your care team and make appointments. If you have questions, please call your primary care clinic.  If you do not have a primary care provider, please call [phone] and they will assist you.        Care EveryWhere ID      This is your Care EveryWhere ID. This could be used by other organizations to access your [facility] medical records  [id number]         Blood Pressure from Last 3 Encounters:   02/09/18 131/84   02/09/18 131/84   12/22/17 155/88    Weight from Last 3 Encounters:   02/09/18 159 lb 4.8 oz (72.3 kg)   02/09/18 159 lb 4.8 oz (72.3 kg)   12/22/17 158 lb 4.6 oz (71.8 kg)              Today, you had the following     No orders found for display         Today's Medication Changes          These changes are accurate as of 2/9/18 11:59 PM.  If you have any questions, ask your nurse or doctor.               These medicines have changed or have updated prescriptions.        Dose/Directions    albuterol 108 (90 BASE) MCG/ACT Inhaler   Commonly known as:  PROAIR HFA/PROVENTIL HFA/VENTOLIN HFA   This may have changed:    - when to take this  - additional instructions  - Another medication with the same name was removed. Continue taking this medication, and follow the directions you see here.   Used for:  CF (cystic fibrosis) (H), Bronchiectasis without acute exacerbation (H)        Dose:  2 puff   Inhale 2 puffs into the lungs every 4 hours as needed for shortness of breath / dyspnea or wheezing   Quantity:  3 Inhaler   Refills:  3         Stop taking these medicines if you haven't already. Please contact your care team if you have questions.     dornase alpha 1 MG/ML neb solution   Commonly known as:  PULMOZYME   Stopped by:  Ziyad Meyer MD                    Primary Care Provider Office Phone # Fax #    Dorene Mireles [phone]       [facility] [address] [place] [address]        Equal Access to Services     BAMBI CHAU : Hadii itzel Taylor, waaxda luqadaha, qaybta kaalmada samantha sen. So [facility] [phone].    ATENCIÓN: Si habla español, tiene a babcock disposición servicios gratuitos de asistencia lingüística. Llame al [phone].    We  comply with applicable federal civil rights laws and [place] laws. We do not discriminate on the basis of race, color, national origin, age, disability, sex, sexual orientation, or gender identity.            Thank you!     Thank you for choosing [facility] CYSTIC FIBROSIS [facility]ENTER [facility]  for your care. Our goal is always to provide you with excellent care. Hearing back from our patients is one way we can continue to improve our services. Please take a few minutes to complete the written survey that you may receive in the mail after your visit with us. Thank you!             Your Updated Medication List - Protect others around you: Learn how to safely use, store and throw away your medicines at www.disposemymeds.org.          This list is accurate as of 2/9/18 11:59 PM.  Always use your most recent med list.                   Brand Name Dispense Instructions for use Diagnosis    albuterol 108 (90 BASE) MCG/ACT Inhaler    PROAIR HFA/PROVENTIL HFA/VENTOLIN HFA    3 Inhaler    Inhale 2 puffs into the lungs every 4 hours as needed for shortness of breath / dyspnea or wheezing    CF (cystic fibrosis) (H), Bronchiectasis without acute exacerbation (H)       azithromycin 500 MG tablet    ZITHROMAX    39 tablet    Take 1 tablet (500 mg) by mouth Every Mon, Wed, Fri Morning    Cystic fibrosis with pulmonary manifestations (H), Gastroesophageal reflux disease without esophagitis, Pancreatic insufficiency, Vitamin D deficiency       aztreonam lysine 75 MG Solr    JUDD    84 mL    Take 1 mL (75 mg) by nebulization 3 times daily Use 28 days on, 28 days off.    CF (cystic fibrosis) (H)       hydrochlorothiazide 12.5 MG capsule    MICROZIDE     Take 12.5 mg by mouth daily        lumacaftor-ivacaftor 200-125 MG tablet    ORKAMBI    336 tablet    TAKE 2 TABLETS BY MOUTH EVERY 12 HOURS WITH FAT CONTAINING FOOD. STOREAT ROOM TEMPERATURE.    CF (cystic fibrosis) (H)       multivitamin CF formula chewable tablet      60 tablet    Take 2 tablets by mouth daily    Cystic fibrosis with pulmonary manifestations (H), Gastroesophageal reflux disease without esophagitis, Pancreatic insufficiency, Vitamin D deficiency       oseltamivir 75 MG capsule    TAMIFLU    10 capsule    Take 1 capsule (75 mg) by mouth 2 times daily    CF (cystic fibrosis) (H), Cystic fibrosis with pulmonary manifestations (H), Gastroesophageal reflux disease without esophagitis, Pancreatic insufficiency, Vitamin D deficiency, Abnormal glucose tolerance test, Screening for diabetes mellitus (DM), Screening for hyperlipidemia, Lipid screening       pantoprazole 40 MG EC tablet    PROTONIX    180 tablet    Take 1 tablet (40 mg) by mouth 2 times daily    Gastroesophageal reflux disease without esophagitis, Cystic fibrosis with pulmonary manifestations (H), Pancreatic insufficiency, Vitamin D deficiency       EDU ALTERA NEBULIZER HANDSET Misc     1 each    1 Device 3 times daily 28 days on, 28 days off.    Cystic fibrosis with pulmonary manifestations (H)       EDU ALTERA NEBULIZER SYSTEM Misc     1 each    1 Units 3 times daily 28 days on, 28 days off.    Cystic fibrosis with pulmonary manifestations (H)       ranitidine 150 MG tablet    ZANTAC    60 tablet    Take 1 tablet (150 mg) by mouth 2 times daily    Cystic fibrosis with pulmonary manifestations (H), Gastroesophageal reflux disease without esophagitis, Pancreatic insufficiency, Vitamin D deficiency       sodium chloride inhalant 7 % Nebu neb solution     720 mL    Take 4 mLs by nebulization daily    Cystic fibrosis with pulmonary manifestations (H), Gastroesophageal reflux disease without esophagitis, Pancreatic insufficiency, Vitamin D deficiency, CF (cystic fibrosis) (H), Abnormal glucose tolerance test, Screening for diabetes mellitus (DM), Screening for hyperlipidemia, Lipid screening       vitamin D 2000 UNITS Caps     60 capsule    Take 2 capsules by mouth daily    Cystic fibrosis with pulmonary  manifestations (H), Gastroesophageal reflux disease without esophagitis, Pancreatic insufficiency, Vitamin D deficiency       ZENPEP 24270 UNITS Cpep   Generic drug:  amylase-lipase-protease     3240 capsule    Take 6 with meals and 3 with snacks. (3 meals and 3 snacks per day)    Cystic fibrosis with pulmonary manifestations (H), Gastroesophageal reflux disease without esophagitis, Pancreatic insufficiency, Vitamin D deficiency, CF (cystic fibrosis) (H), Abnormal glucose tolerance test, Screening for diabetes mellitus (DM), Screening for hyperlipidemia, Lipid screening

## 2018-02-09 NOTE — MR AVS SNAPSHOT
After Visit Summary   2/9/2018    Michele Altamirano    MRN: [id number]           Patient Information     Date Of Birth          1965        Visit Information        Provider Department      2/9/2018 11:50 AM Ziyad Meyer MD [facility]        Today's Diagnoses     CF (cystic fibrosis) (H)        Cystic fibrosis with pulmonary manifestations (H)        Gastroesophageal reflux disease without esophagitis        Pancreatic insufficiency        Vitamin D deficiency        Abnormal glucose tolerance test        Screening for diabetes mellitus (DM)        Screening for hyperlipidemia        Lipid screening          Care Instructions    Cystic Fibrosis Self-Care Plan    RECOMMENDATIONS:   Consider massage or Yoga    Otherwise continue current medication, nebs and vest therapy.           CF Nurse line:          Ramirez Marlow   [phone]            CF Resp Therapist: Alyse Cheek            [phone]   CF Dietitians:           Willa Romero            [phone]                       Alondar Vitale                       [phone]   CF Diabetes Nurse: Livia Mondragon             [phone]    CF Social Workers:  Kaykay Vieyra             [phone]                Ayala Andino            [phone]  CF Pharmacist:        Jael Arvizu                              [phone]  [email or web address]         MRN: [id number]   Clinic Date: February 9, 2018   Patient: Michele Altamirano     Annual Studies:   IGG   Date Value Ref Range Status   09/24/2015  695 - 1620 mg/dL Final    Canceled, Test credited   Canceled by station  As requested by nurse 1020,9/24/2015.  MR       Insulin   Date Value Ref Range Status   02/09/2018 37.8 (H) 3 - 25 mU/L Final     Comment:     Starting 7/13/2017, insulin results will decrease by approximately 37%   compared to insulin results reported in EPIC between (12/16/2016-7/12/2017),   and should be interpreted accordingly. Insulin  results reported prior to   12/16/16 are comparable to current insulin results and therefore no adjustment   is needed.       There are no preventive care reminders to display for this patient.      Pulmonary Function Tests  FEV1: amount of air you can blow out in 1 second  FVC: total amount of air you can take in and blow out    Your Goals:         PFT Latest Ref Rng & Units 2/9/2018   FVC L 3.78   FEV1 L 2.52   FVC% % 79   FEV1% % 67          Airway Clearance: The Most Important Way to Keep Your Lungs Healthy  Vest Settings:    Hill-Rom Frequencies: 8, 9, 10 Pressure 10 Then, Frequencies 18, 19, 20 Pressure 6      RespirTech: Quick Start with Pressure of     Do each frequency for 5 minutes; Deflate vest after each frequency & cough 3 times before beginning the next setting.    Vest and Neb Therapy should be done 2 times/day.    Good Nutrition Can Improve Lung Function and Overall Health     Take ALL of your vitamins with food     Take 1/2 of your enzymes before EVERY meal/snack and the other 1/2 mid-meal/snack    Wt Readings from Last 3 Encounters:   02/09/18 72.3 kg (159 lb 4.8 oz)   02/09/18 72.3 kg (159 lb 4.8 oz)   12/22/17 71.8 kg (158 lb 4.6 oz)       Body mass index is 23.59 kg/(m^2).         National CF Foundation Recommendations for BMI in CF Adults: Women: at least 22 Men: at least 23        Controlling Blood Sugars Helps Prevent Lung Infections & Improves Nutrition  Test blood sugar:     In the morning before eating (goal is )     2 hours after a meal (goal is less than 150)     When pre-meal glucose is greater than 150 add correction     At bedtime (if less than 100 eat a snack with 15 grams of carbohydrates  Last A1C Results:   Hemoglobin A1C   Date Value Ref Range Status   02/09/2018 6.3 (H) 4.3 - 6.0 % Final         If diabetic, measure A1C every 6 months. Goal: Under 7%    Staying Healthy    Research:  If you are interested in learning about research opportunities or have questions,  please contact Jessenia Dalton at [phone] or [email or web address].      CF Foundation:  Compass is a personalized resource service to help you with the insurance, financial, legal and other issues you are facing.  It's free, confidential and available to anyone with CF.  Ask your  for more information or contact Compass directly at 051-LIBLTDS ([phone]) or compass@cff.org, or learn more at cff.org/compass.                             Follow-ups after your visit        Follow-up notes from your care team     Return in about 2 months (around 4/9/2018).      Your next 10 appointments already scheduled     May 09, 2018  7:50 AM CDT   (Arrive by 7:35 AM)   RETURN CYSTIC FIBROSIS VISIT with Siobhan Munoz MD   [facility] ([facility])    [address] [place] [address]  [address] [place] [id number] [phone]              Future tests that were ordered for you today     Open Future Orders        Priority Expected Expires Ordered    Cystic Fibrosis Culture Aerob Bacterial Routine 4/9/2018 5/9/2018 2/9/2018    Spirometry, Breathing Capacity Routine 4/9/2018 5/9/2018 2/9/2018    Hepatic panel Routine 4/9/2018 5/9/2018 2/9/2018            Who to contact     If you have questions or need follow up information about today's clinic visit or your schedule please contact [facility] directly at [phone].  Normal or non-critical lab and imaging results will be communicated to you by MyChart, letter or phone within 4 business days after the clinic has received the results. If you do not hear from us within 7 days, please contact the clinic through MyChart or phone. If you have a critical or abnormal lab result, we will notify you by phone as soon as possible.  Submit refill requests through [employer] or call your pharmacy and they will forward the refill request to us. Please allow 3 business days for your refill to be  completed.          Additional Information About Your Visit        Tour [employer] Information     [employer] gives you secure access to your electronic health record. If you see a primary care provider, you can also send messages to your care team and make appointments. If you have questions, please call your primary care clinic.  If you do not have a primary care provider, please call [phone] and they will assist you.        Care EveryWhere ID     This is your Care EveryWhere ID. This could be used by other organizations to access your [place] medical records  [id number]        Your Vitals Were     Pulse Temperature Respirations Pulse Oximetry BMI (Body Mass Index)       62 96  F (35.6  C) (Oral) 14 98% 23.59 kg/m2        Blood Pressure from Last 3 Encounters:   02/09/18 131/84   02/09/18 131/84   12/22/17 155/88    Weight from Last 3 Encounters:   02/09/18 72.3 kg (159 lb 4.8 oz)   02/09/18 72.3 kg (159 lb 4.8 oz)   12/22/17 71.8 kg (158 lb 4.6 oz)              We Performed the Following     Cystic Fibrosis Culture Aerob Bacterial          Today's Medication Changes          These changes are accurate as of 2/9/18  1:09 PM.  If you have any questions, ask your nurse or doctor.               These medicines have changed or have updated prescriptions.        Dose/Directions    albuterol 108 (90 BASE) MCG/ACT Inhaler   Commonly known as:  PROAIR HFA/PROVENTIL HFA/VENTOLIN HFA   This may have changed:    - when to take this  - additional instructions  - Another medication with the same name was removed. Continue taking this medication, and follow the directions you see here.   Used for:  CF (cystic fibrosis) (H), Bronchiectasis without acute exacerbation (H)        Dose:  2 puff   Inhale 2 puffs into the lungs every 4 hours as needed for shortness of breath / dyspnea or wheezing   Quantity:  3 Inhaler   Refills:  3         Stop taking these medicines if you haven't already. Please contact your care team if you have  questions.     dornase alpha 1 MG/ML neb solution   Commonly known as:  PULMOZYME   Stopped by:  Ziyad Meyer MD                    Primary Care Provider Office Phone # Fax #    Dorene Mireles [phone]       [facility] [address]  [facility] [address]        Equal Access to Services     [facility]: Hadii aad ku hadasho Soomaali, waaxda luqadaha, qaybta kaalmada adeegyada, waxay idiin hayaan adedaniel dalejoshua brandonn . So [facility] [phone].    ATENCIÓN: Si habla español, tiene a babcock disposición servicios gratuitos de asistencia lingüística. [facility] [phone].    We comply with applicable federal civil rights laws and [place] laws. We do not discriminate on the basis of race, color, national origin, age, disability, sex, sexual orientation, or gender identity.            Thank you!     Thank you for choosing [facility]  for your care. Our goal is always to provide you with excellent care. Hearing back from our patients is one way we can continue to improve our services. Please take a few minutes to complete the written survey that you may receive in the mail after your visit with us. Thank you!             Your Updated Medication List - Protect others around you: Learn how to safely use, store and throw away your medicines at www.disposemymeds.org.          This list is accurate as of 2/9/18  1:09 PM.  Always use your most recent med list.                   Brand Name Dispense Instructions for use Diagnosis    albuterol 108 (90 BASE) MCG/ACT Inhaler    PROAIR HFA/PROVENTIL HFA/VENTOLIN HFA    3 Inhaler    Inhale 2 puffs into the lungs every 4 hours as needed for shortness of breath / dyspnea or wheezing    CF (cystic fibrosis) (H), Bronchiectasis without acute exacerbation (H)       azithromycin 500 MG tablet    ZITHROMAX    39 tablet    Take 1 tablet (500 mg) by mouth Every Mon, Wed, Fri Morning    Cystic fibrosis with pulmonary manifestations  (H), Gastroesophageal reflux disease without esophagitis, Pancreatic insufficiency, Vitamin D deficiency       aztreonam lysine 75 MG Solr    JUDD    84 mL    Take 1 mL (75 mg) by nebulization 3 times daily Use 28 days on, 28 days off.    CF (cystic fibrosis) (H)       lumacaftor-ivacaftor 200-125 MG tablet    ORKAMBI    336 tablet    TAKE 2 TABLETS BY MOUTH EVERY 12 HOURS WITH FAT CONTAINING FOOD. STOREAT ROOM TEMPERATURE.    CF (cystic fibrosis) (H)       multivitamin CF formula chewable tablet     60 tablet    Take 2 tablets by mouth daily    Cystic fibrosis with pulmonary manifestations (H), Gastroesophageal reflux disease without esophagitis, Pancreatic insufficiency, Vitamin D deficiency       oseltamivir 75 MG capsule    TAMIFLU    10 capsule    Take 1 capsule (75 mg) by mouth 2 times daily    CF (cystic fibrosis) (H), Cystic fibrosis with pulmonary manifestations (H), Gastroesophageal reflux disease without esophagitis, Pancreatic insufficiency, Vitamin D deficiency, Abnormal glucose tolerance test, Screening for diabetes mellitus (DM), Screening for hyperlipidemia, Lipid screening       pantoprazole 40 MG EC tablet    PROTONIX    180 tablet    Take 1 tablet (40 mg) by mouth 2 times daily    Gastroesophageal reflux disease without esophagitis, Cystic fibrosis with pulmonary manifestations (H), Pancreatic insufficiency, Vitamin D deficiency       EDU ALTERA NEBULIZER HANDSET Misc     1 each    1 Device 3 times daily 28 days on, 28 days off.    Cystic fibrosis with pulmonary manifestations (H)       EDU ALTERA NEBULIZER SYSTEM Misc     1 each    1 Units 3 times daily 28 days on, 28 days off.    Cystic fibrosis with pulmonary manifestations (H)       ranitidine 150 MG tablet    ZANTAC    60 tablet    Take 1 tablet (150 mg) by mouth 2 times daily    Cystic fibrosis with pulmonary manifestations (H), Gastroesophageal reflux disease without esophagitis, Pancreatic insufficiency, Vitamin D deficiency        sodium chloride 0.65 % nasal spray    OCEAN    88 mL    Spray 2 sprays into both nostrils every 2 hours (while awake)    Chronic pansinusitis       sodium chloride inhalant 7 % Nebu neb solution     720 mL    Take 4 mLs by nebulization daily    Cystic fibrosis with pulmonary manifestations (H), Gastroesophageal reflux disease without esophagitis, Pancreatic insufficiency, Vitamin D deficiency, CF (cystic fibrosis) (H), Abnormal glucose tolerance test, Screening for diabetes mellitus (DM), Screening for hyperlipidemia, Lipid screening       vitamin D 2000 UNITS Caps     60 capsule    Take 2 capsules by mouth daily    Cystic fibrosis with pulmonary manifestations (H), Gastroesophageal reflux disease without esophagitis, Pancreatic insufficiency, Vitamin D deficiency       ZENPEP 42143 UNITS Cpep   Generic drug:  amylase-lipase-protease     3240 capsule    Take 6 with meals and 3 with snacks. (3 meals and 3 snacks per day)    Cystic fibrosis with pulmonary manifestations (H), Gastroesophageal reflux disease without esophagitis, Pancreatic insufficiency, Vitamin D deficiency, CF (cystic fibrosis) (H), Abnormal glucose tolerance test, Screening for diabetes mellitus (DM), Screening for hyperlipidemia, Lipid screening

## 2018-02-09 NOTE — PROGRESS NOTES
SUBJECTIVE/OBJECTIVE:                           iMchele Altamirano is a 52 year old male coming in for routine clinic visit.  His primary pulmonologist is Dr. Ziyad Meyer.    Allergies/ADRs: Reviewed in Epic  Tobacco: No tobacco use  Alcohol: none  PMH: Reviewed in Epic  CF Genotype: [id number]    Medication Adherence  The patient misses their oral medication 0 times per week.  He does say he is not good about doing his VEST and nebulized medications, missing up to 7 times per week. He was challenged to do his VEST/nebs twice a day for 30 days and states today that he was unable to do this.  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  Patient is responsible for his/her own medications.   Patient estimated adherence level: % for oral meds, 50-65% for VEST/nebs  Pharmacy MPR is unavailable.   Barriers to medication adherence include: dosing frequency and busy schedule  Facilitators to medication adherence include: routine    Medication Access  The patient fills specialty prescriptions at [facility] and general medications at  [facility] in [place].    Medication access barriers: no issues reported by patient.  Has the patient been offered to fill at [place]? Yes - however the patient is restricted to filling their prescriptions at a different pharmacy. Michele is on [facility] which he can get through [facility].  Programs or coupons used: [id number], GPS and [employer] co-pay card  Michele is happy with the customer service and care he receives from both pharmacies.     CF  Inhaled medications   Bronchodilator: albuterol   Mucolytic: Pulmozyme and hypertonic saline - may stop Pulmozyme if not seeing benefit  Antibiotic: [place]  Other: n/a  Oral medications   Azithromycin: taking  CFTR modulator: Orkambi    Other: tamiflu prn flu season  Pulmonary symptoms are stable  PFTs are increased  Current FEV1  "67  Cultures: sputum cultures grow Staph and Pseudomonas  Current exacerbation: no    CFTR: Patient s genotype is [id number].    Patient is currently on Orkambi 200/250mg twice daily which was started January 2016.  Benefits of therapy include: stable PFTs  Side effects of Kalydeco/Orkambi include Hypertension  LFTs have been normal  Michele reports that he has not had an \"a-ha\" moment with Orkambi and feels that other than maintaining his baseline he has not seen much benefit from the medication.  He has developed hypertension secondary to Orkambi and was recently started on hydrochlorothiazide 12.5mg once daily by his PCP.   Michele would be a good candidate to switch to tezacaftor/ivacaftor once approved.  He is very interested in doing this and will discuss at his next follow-up appointment.  Advised he would need to go back to quarterly LFT monitoring for the first year and quarterly visits which he said would not be a problem for him.      Pancreatic Insufficiency/Nutrition: Pancreatic enzyme replacement with Zenpep 94143  Patient is taking 6 capsules with meals and 3 capsules with snacks.    Acid Reducer: Protonix (pantoprazole) 40mg BID and Zantac (ranitidine) 150mg BID  Bowel regimen: n/a  Weight and BMI are increased  Vitamins include: AQUADEK 2 caps daily, Vitamin D 4,000 IU daily.  Vitamins obtained through [employer].  Most recent vitamin D = 23, Vitamin E 10.2, Vitamin A 0.56, INR 0.89        PFTs:      Weight/BMI:      ASSESSMENT:                             Current medications were reviewed today.     Medication Adherence: counseled patient on the following: importance of adherence to VEST/nebs.  He is doing very well with oral medications and encouraged him to continue this.    Medication Access: no issues identified    CF: Needs Improvement. Patient would benefit from the following medication changes: once tezacaftor/ivacaftor is available Michele would benefit from changing Orkambi to marcela/lydia.  He " has developed hypertension while on Orkambi which may resolve if switched to marcela/lydia.  If resolved he could discontinue hydrochlorothiazide. Discussed with Dr. Meyer and will likely make this switch at his next clinic appointment.     Pancreatic Insufficiency/Nutrition: Stable.  Patient would benefit from no changes at this time      PLAN:                            1. Continue to try to do your VEST/nebs twice daily.  2. Good job taking your oral meds every day!  3. At your next visit we can discuss tezacaftor/ivacaftor approval.       I spent 15 minutes with this patient today.      Will follow up at next clinic appointment.    The patient was provided a summary of these recommendations in the AVS from CF care team visit.    Ameena Guerrero PharmD  CF Clinic Pharmacist  Phone: [phone]  E-mail: [email or web address]

## 2018-02-10 LAB — TESTOST SERPL-MCNC: 1176 NG/DL (ref 240–950)

## 2018-02-11 LAB — IGE SERPL-ACNC: 18 KIU/L (ref 0–114)

## 2018-02-12 LAB
A-TOCOPHEROL VIT E SERPL-MCNC: 10.2 MG/L (ref 5.5–18)
ANNOTATION COMMENT IMP: NORMAL
BETA+GAMMA TOCOPHEROL SERPL-MCNC: 0.4 MG/L (ref 0–6)
RETINYL PALMITATE SERPL-MCNC: <0.02 MG/L (ref 0–0.1)
VIT A SERPL-MCNC: 0.56 MG/L (ref 0.3–1.2)

## 2018-02-12 RX ORDER — HYDROCHLOROTHIAZIDE 12.5 MG/1
12.5 CAPSULE ORAL DAILY
COMMUNITY

## 2018-02-14 LAB
BACTERIA SPEC CULT: ABNORMAL
SPECIMEN SOURCE: ABNORMAL

## 2018-04-09 DIAGNOSIS — K86.89 PANCREATIC INSUFFICIENCY: ICD-10-CM

## 2018-04-09 DIAGNOSIS — E84.0 CYSTIC FIBROSIS WITH PULMONARY MANIFESTATIONS (H): ICD-10-CM

## 2018-04-09 DIAGNOSIS — K21.9 GASTROESOPHAGEAL REFLUX DISEASE WITHOUT ESOPHAGITIS: ICD-10-CM

## 2018-04-09 DIAGNOSIS — Z13.1 SCREENING FOR DIABETES MELLITUS (DM): ICD-10-CM

## 2018-04-09 DIAGNOSIS — Z13.220 LIPID SCREENING: ICD-10-CM

## 2018-04-09 DIAGNOSIS — E84.9 CF (CYSTIC FIBROSIS) (H): ICD-10-CM

## 2018-04-09 DIAGNOSIS — Z13.220 SCREENING FOR HYPERLIPIDEMIA: ICD-10-CM

## 2018-04-09 DIAGNOSIS — E55.9 VITAMIN D DEFICIENCY: ICD-10-CM

## 2018-04-09 DIAGNOSIS — R73.09 ABNORMAL GLUCOSE TOLERANCE TEST: ICD-10-CM

## 2018-04-10 DIAGNOSIS — E84.0 CYSTIC FIBROSIS WITH PULMONARY MANIFESTATIONS (H): Primary | ICD-10-CM

## 2018-05-02 DIAGNOSIS — E84.9 CF (CYSTIC FIBROSIS) (H): Primary | ICD-10-CM

## 2018-05-03 ENCOUNTER — CLINICAL UPDATE (OUTPATIENT)
Dept: PHARMACY | Facility: CLINIC | Age: 53
End: 2018-05-03

## 2018-05-03 NOTE — PROGRESS NOTES
At the request of patient's provider, a pre-visit chart review was completed.    CFTR:   Patient is eligible for treatment with Symdeko (tezacaftor/ivacaftor) based on their CF genotype and age.  Patient s genotype is [id number]  Patient is currently on Orkambi 400/250mg twice daily which was started January 2016.  Side effects of Kalydeco/Orkambi include hypertension  LFTs have been normal  Drug-drug interactions with Symdeko (tezacaftor/ivacaftor) no significant drug-drug interactions identified  Dose adjustment needed for Symdeko none  Dose adjustment needed for concomitant medications if Orkambi is stopped and Symdeko initiated recommend monitoring blood pressure closely for the first two months and discontinuing HCTZ if blood pressure is low.    Recommendation: Michele has been on Orkambi and developed hypertension, a known side effect.  He was put on HCTZ for this.  He would be a good candidate to switch to Symdeko.  See recommendation for HCTZ above.       Outpatient Prescriptions Marked as Taking for the 5/3/18 encounter (Clinical Update) with Ameena Guerrero [place]   Medication Sig     albuterol (PROAIR HFA, PROVENTIL HFA, VENTOLIN HFA) 108 (90 BASE) MCG/ACT inhaler Inhale 2 puffs into the lungs every 4 hours as needed for shortness of breath / dyspnea or wheezing (Patient taking differently: Inhale 2 puffs into the lungs Once a day with saline or every other day)     amylase-lipase-protease (ZENPEP) 72083-04228 units CPEP Take 8 capsules (160,000 Units) by mouth 3 times daily (with meals) Take 4 caps with snacks     azithromycin (ZITHROMAX) 500 MG tablet Take 1 tablet (500 mg) by mouth Every Mon, Wed, Fri Morning     aztreonam lysine (CAYSTON) 75 MG SOLR Take 1 mL (75 mg) by nebulization 3 times daily Use 28 days on, 28 days off.     Cholecalciferol (VITAMIN D) 2000 UNITS CAPS Take 2 capsules by mouth daily     hydrochlorothiazide (MICROZIDE) 12.5 MG capsule Take 12.5 mg by mouth daily      lumacaftor-ivacaftor (ORKAMBI) 200-125 MG tablet TAKE 2 TABLETS BY MOUTH EVERY 12 HOURS WITH FAT CONTAINING FOOD. STOREAT ROOM TEMPERATURE.     multivitamin CF formula (AQUADEKS) chewable tablet Take 2 tablets by mouth daily     oseltamivir (TAMIFLU) 75 MG capsule Take 1 capsule (75 mg) by mouth 2 times daily     pantoprazole (PROTONIX) 40 MG EC tablet Take 1 tablet (40 mg) by mouth 2 times daily     EDU ALTERA NEBULIZER SYSTEM MISC 1 Units 3 times daily 28 days on, 28 days off.     ranitidine (ZANTAC) 150 MG tablet Take 1 tablet (150 mg) by mouth 2 times daily     Respiratory Therapy Supplies (EDU ALTERA NEBULIZER HANDSET) MISC 1 Device 3 times daily 28 days on, 28 days off.     sodium chloride inhalant 7 % NEBU neb solution Take 4 mLs by nebulization daily     ZENPEP 16068 UNITS CPEP per EC capsule Take 6 with meals and 3 with snacks. (3 meals and 3 snacks per day)

## 2018-05-23 DIAGNOSIS — E84.9 CF (CYSTIC FIBROSIS) (H): ICD-10-CM

## 2018-05-23 RX ORDER — CIPROFLOXACIN 750 MG/1
TABLET, FILM COATED ORAL
Qty: 42 TABLET | Refills: 0 | OUTPATIENT
Start: 2018-05-23

## 2018-05-24 ENCOUNTER — CARE COORDINATION (OUTPATIENT)
Dept: PULMONOLOGY | Facility: CLINIC | Age: 53
End: 2018-05-24

## 2018-05-24 DIAGNOSIS — J47.9 BRONCHIECTASIS WITHOUT ACUTE EXACERBATION (H): Primary | ICD-10-CM

## 2018-05-24 RX ORDER — CIPROFLOXACIN 750 MG/1
750 TABLET, FILM COATED ORAL 2 TIMES DAILY
Qty: 42 TABLET | Refills: 0 | Status: SHIPPED | OUTPATIENT
Start: 2018-05-24 | End: 2018-06-14

## 2018-05-24 NOTE — PROGRESS NOTES
Call from Michele:    Traveling to [place] next week with family. Wondering if he can take along an antibiotic in the event of illness while traveling.    PLAN:  Discussed with Dr Munoz:  Cipro 750 mg BID x21 days - to take in the event of illness.

## 2018-07-23 DIAGNOSIS — E84.9 CF (CYSTIC FIBROSIS) (H): ICD-10-CM

## 2018-07-26 DIAGNOSIS — E84.9 CF (CYSTIC FIBROSIS) (H): ICD-10-CM

## 2018-08-15 DIAGNOSIS — E84.0 CYSTIC FIBROSIS WITH PULMONARY MANIFESTATIONS (H): ICD-10-CM

## 2018-08-15 DIAGNOSIS — E55.9 VITAMIN D DEFICIENCY: ICD-10-CM

## 2018-08-15 DIAGNOSIS — K86.89 PANCREATIC INSUFFICIENCY: ICD-10-CM

## 2018-08-15 DIAGNOSIS — K21.9 GASTROESOPHAGEAL REFLUX DISEASE WITHOUT ESOPHAGITIS: ICD-10-CM

## 2018-08-15 RX ORDER — PANTOPRAZOLE SODIUM 40 MG/1
TABLET, DELAYED RELEASE ORAL
Qty: 180 TABLET | Refills: 3 | Status: SHIPPED | OUTPATIENT
Start: 2018-08-15 | End: 2019-08-23

## 2018-10-23 ENCOUNTER — PATIENT OUTREACH (OUTPATIENT)
Dept: CARE COORDINATION | Facility: CLINIC | Age: 53
End: 2018-10-23

## 2018-10-24 ENCOUNTER — ALLIED HEALTH/NURSE VISIT (OUTPATIENT)
Dept: CARE COORDINATION | Facility: CLINIC | Age: 53
End: 2018-10-24

## 2018-10-24 ENCOUNTER — OFFICE VISIT (OUTPATIENT)
Dept: PHARMACY | Facility: CLINIC | Age: 53
End: 2018-10-24
Payer: COMMERCIAL

## 2018-10-24 ENCOUNTER — OFFICE VISIT (OUTPATIENT)
Dept: PULMONOLOGY | Facility: CLINIC | Age: 53
End: 2018-10-24
Attending: INTERNAL MEDICINE
Payer: COMMERCIAL

## 2018-10-24 VITALS
WEIGHT: 156.53 LBS | BODY MASS INDEX: 23.18 KG/M2 | SYSTOLIC BLOOD PRESSURE: 145 MMHG | OXYGEN SATURATION: 98 % | HEART RATE: 69 BPM | HEIGHT: 69 IN | DIASTOLIC BLOOD PRESSURE: 93 MMHG | RESPIRATION RATE: 17 BRPM

## 2018-10-24 DIAGNOSIS — E84.9 CYSTIC FIBROSIS (H): Primary | ICD-10-CM

## 2018-10-24 DIAGNOSIS — Z13.9 RISK AND FUNCTIONAL ASSESSMENT: Primary | ICD-10-CM

## 2018-10-24 DIAGNOSIS — E84.0 CYSTIC FIBROSIS WITH PULMONARY MANIFESTATIONS (H): Primary | ICD-10-CM

## 2018-10-24 LAB
EXPTIME-PRE: 10.83 SEC
FEF2575-%PRED-PRE: 39 %
FEF2575-PRE: 1.35 L/SEC
FEF2575-PRED: 3.37 L/SEC
FEFMAX-%PRED-PRE: 82 %
FEFMAX-PRE: 7.82 L/SEC
FEFMAX-PRED: 9.48 L/SEC
FEV1-%PRED-PRE: 67 %
FEV1-PRE: 2.51 L
FEV1FEV6-PRE: 70 %
FEV1FEV6-PRED: 80 %
FEV1FVC-PRE: 66 %
FEV1FVC-PRED: 77 %
FIFMAX-PRE: 7.43 L/SEC
FVC-%PRED-PRE: 80 %
FVC-PRE: 3.82 L
FVC-PRED: 4.74 L

## 2018-10-24 PROCEDURE — G0463 HOSPITAL OUTPT CLINIC VISIT: HCPCS | Mod: 25

## 2018-10-24 PROCEDURE — 94375 RESPIRATORY FLOW VOLUME LOOP: CPT | Mod: ZF | Performed by: INTERNAL MEDICINE

## 2018-10-24 PROCEDURE — 99207 ZZC NO CHARGE LOS: CPT | Performed by: PHARMACIST

## 2018-10-24 ASSESSMENT — PAIN SCALES - GENERAL: PAINLEVEL: NO PAIN (0)

## 2018-10-24 NOTE — PATIENT INSTRUCTIONS
Cystic Fibrosis Self-Care Plan    RECOMMENDATIONS:   Let me know if you want to start symdeko.  Follow your blood pressure.  Continue to check blood sugars as needed.    YOUR GOAL: Enjoy the Holidays!      CF Nurse line:          Ramirez Marlow   [phone]            CF Resp Therapist: Alyse Cheek            [phone]   CF Dietitians:           Willa Romero            [phone]                       Alondra Vitale                       [phone]   CF Diabetes Nurse: Livia Mondragon             [phone]    CF Social Workers:  Kaykay Vieyra             [phone]                Ayala Andino            [phone]  CF Pharmacist:        Jael Arvizu                              [phone]  [email or web address]         MRN: [id number]   Clinic Date: October 24, 2018   Patient: Michele Altamirano     Annual Studies:   IGG   Date Value Ref Range Status   02/09/2018 883 695 - 1620 mg/dL Final     Insulin   Date Value Ref Range Status   02/09/2018 36.2 (H) 3 - 25 mU/L Final     Comment:     Starting 7/13/2017, insulin results will decrease by approximately 37%   compared to insulin results reported in EPIC between (12/16/2016-7/12/2017),   and should be interpreted accordingly. Insulin results reported prior to   12/16/16 are comparable to current insulin results and therefore no adjustment   is needed.       There are no preventive care reminders to display for this patient.      Pulmonary Function Tests  FEV1: amount of air you can blow out in 1 second  FVC: total amount of air you can take in and blow out    Your Goals:         PFT Latest Ref Rng & Units 10/24/2018   FVC L 3.82   FEV1 L 2.51   FVC% % 80   FEV1% % 67          Airway Clearance: The Most Important Way to Keep Your Lungs Healthy  Vest Settings:    Hill-Rom Frequencies: 8, 9, 10 Pressure 10 Then, Frequencies 18, 19, 20 Pressure 6      RespirTech: Quick Start with Pressure of     Do each frequency for 5 minutes; Deflate vest after  each frequency & cough 3 times before beginning the next setting.   Neb Therapy should be done 2-3 times/day.    Good Nutrition Can Improve Lung Function and Overall Health     Take ALL of your vitamins with food     Take 1/2 of your enzymes before EVERY meal/snack and the other 1/2 mid-meal/snack    Wt Readings from Last 3 Encounters:   10/24/18 71 kg (156 lb 8.4 oz)   02/09/18 72.3 kg (159 lb 4.8 oz)   02/09/18 72.3 kg (159 lb 4.8 oz)       Body mass index is 23.18 kg/(m^2).         National CF Foundation Recommendations for BMI in CF Adults: Women: at least 22 Men: at least 23        Controlling Blood Sugars Helps Prevent Lung Infections & Improves Nutrition  Test blood sugar:     In the morning before eating (goal is )     2 hours after a meal (goal is less than 150)     When pre-meal glucose is greater than 150 add correction     At bedtime (if less than 100 eat a snack with 15 grams of carbohydrates  Last A1C Results:   Hemoglobin A1C   Date Value Ref Range Status   02/09/2018 6.3 (H) 4.3 - 6.0 % Final         If diabetic, measure A1C every 6 months. Goal: Under 7%    Staying Healthy    Research:  If you are interested in learning about research opportunities or have questions, please contact the CF Research Team at [phone] or [email or web address].      CF Foundation:  Compass is a personalized resource service to help you with the insurance, financial, legal and other issues you are facing.  It's free, confidential and available to anyone with CF.  Ask your  for more information or contact Compass directly at 862-OPRLZPP ([phone]) or compass@cff.org, or learn more at cff.org/compass.

## 2018-10-24 NOTE — PROGRESS NOTES
CF screen for PT needs    Symptoms of urinary incontinence: none reported   Exercise regimen (aerobic or resistance)/ daily activity: Since hamstring injury in May, is only exercising 1x/wk on bike for 5-10 miles.  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  Reports being active his whole life and attributes this, in part, to stable lung function.   CF related pain limiting participation in daily activities: Long standing headaches and back pain, pt thinks related to poor posture.    Gross postural impairment: Moderate forward head, decrease lumbar lordosis, moderately protracted shoulder. Upon palpation, lumbar paraspinals are very tight.     Recommends pt see OP PT for more regular ongoing treatment.  Pt is on board.  OP PT to improve posture, cervicogenic headaches, back pain and hamstring injury.

## 2018-10-24 NOTE — MR AVS SNAPSHOT
After Visit Summary   10/24/2018    Michele Altamirano    MRN: [id number]           Patient Information     Date Of Birth          1965        Visit Information        Provider Department      10/24/2018 8:30 AM Siobhan Munoz MD [facility]        Today's Diagnoses     Cystic fibrosis with pulmonary manifestations (H)    -  1      Care Instructions    Cystic Fibrosis Self-Care Plan    RECOMMENDATIONS:   Let me know if you want to start symdeko.  Follow your blood pressure.  Continue to check blood sugars as needed.    YOUR GOAL: Enjoy the Holidays!      CF Nurse line:          Ramirez Marlow   [phone]            CF Resp Therapist: Alyse Cheek            [phone]   CF Dietitians:           Willa Romero            [phone]                       Alondra Vitale                       [phone]   CF Diabetes Nurse: Livia Mondragon             [phone]    CF Social Workers:  Kaykay Vieyra             [phone]                Ayala Andino            [phone]  CF Pharmacist:        Jael Arvizu                              [phone]  [email or web address]         MRN: [id number]   Clinic Date: October 24, 2018   Patient: Michele Altamirano     Annual Studies:   IGG   Date Value Ref Range Status   02/09/2018 883 695 - 1620 mg/dL Final     Insulin   Date Value Ref Range Status   02/09/2018 36.2 (H) 3 - 25 mU/L Final     Comment:     Starting 7/13/2017, insulin results will decrease by approximately 37%   compared to insulin results reported in EPIC between (12/16/2016-7/12/2017),   and should be interpreted accordingly. Insulin results reported prior to   12/16/16 are comparable to current insulin results and therefore no adjustment   is needed.       There are no preventive care reminders to display for this patient.      Pulmonary Function Tests  FEV1: amount of air you can blow out in 1 second  FVC: total amount of air you can take in and  blow out    Your Goals:         PFT Latest Ref Rng & Units 10/24/2018   FVC L 3.82   FEV1 L 2.51   FVC% % 80   FEV1% % 67          Airway Clearance: The Most Important Way to Keep Your Lungs Healthy  Vest Settings:    Hill-Rom Frequencies: 8, 9, 10 Pressure 10 Then, Frequencies 18, 19, 20 Pressure 6      RespirTech: Quick Start with Pressure of     Do each frequency for 5 minutes; Deflate vest after each frequency & cough 3 times before beginning the next setting.   Neb Therapy should be done 2-3 times/day.    Good Nutrition Can Improve Lung Function and Overall Health     Take ALL of your vitamins with food     Take 1/2 of your enzymes before EVERY meal/snack and the other 1/2 mid-meal/snack    Wt Readings from Last 3 Encounters:   10/24/18 71 kg (156 lb 8.4 oz)   02/09/18 72.3 kg (159 lb 4.8 oz)   02/09/18 72.3 kg (159 lb 4.8 oz)       Body mass index is 23.18 kg/(m^2).         National CF Foundation Recommendations for BMI in CF Adults: Women: at least 22 Men: at least 23        Controlling Blood Sugars Helps Prevent Lung Infections & Improves Nutrition  Test blood sugar:     In the morning before eating (goal is )     2 hours after a meal (goal is less than 150)     When pre-meal glucose is greater than 150 add correction     At bedtime (if less than 100 eat a snack with 15 grams of carbohydrates  Last A1C Results:   Hemoglobin A1C   Date Value Ref Range Status   02/09/2018 6.3 (H) 4.3 - 6.0 % Final         If diabetic, measure A1C every 6 months. Goal: Under 7%    Staying Healthy    Research:  If you are interested in learning about research opportunities or have questions, please contact the CF Research Team at [phone] or [email or web address].      CF Foundation:  Compass is a personalized resource service to help you with the insurance, financial, legal and other issues you are facing.  It's free, confidential and available to anyone with CF.  Ask your  for more information or  contact Compass directly at 602-COMPASS ([phone]) or compass@[email or web address], or learn more at cff.org/compass.                             Follow-ups after your visit        Follow-up notes from your care team     Return in about 3 months (around 1/24/2019).      Your next 10 appointments already scheduled     Dec 05, 2018  9:00 AM CST   TELEMEDICINE with Sophia Arvizu PharmD   [facility] ([facility])    [address] [place] [id number] [phone]           Note: this is not an onsite visit; there is no need to come to the facility.            Apr 17, 2019  9:30 AM CDT   CF LOOP with  PFL CF   [facility] ([facility])    [address] [place] [id number] [phone]            Apr 17, 2019  9:50 AM CDT   (Arrive by 9:35 AM)   RETURN CYSTIC FIBROSIS VISIT with Siobhan Munoz MD   [facility] ([facility])    [address] [place] [address] [phone]            Apr 17, 2019 10:30 AM CDT   (Arrive by 10:15 AM)   SHORT with Sophia Arvizu PharmD   [facility] ([facility])    [address] [place] [phone]              Future tests that were ordered for you today     Open Future Orders        Priority Expected Expires Ordered    Cystic Fibrosis Culture Aerob Bacterial Routine  10/24/2019 10/24/2018            Who to contact     If you have questions or need follow up information about today's clinic visit or your schedule please contact [facility] directly at [phone].  Normal or non-critical lab and imaging results will be communicated to you by MyChart, letter or phone within 4 business days after the  "clinic has received the results. If you do not hear from us within 7 days, please contact the clinic through [email or web address] or phone. If you have a critical or abnormal lab result, we will notify you by phone as soon as possible.  Submit refill requests through [email or web address] or call your pharmacy and they will forward the refill request to us. Please allow 3 business days for your refill to be completed.          Additional Information About Your Visit        [email or web address] Information     [email or web address] gives you secure access to your electronic health record. If you see a primary care provider, you can also send messages to your care team and make appointments. If you have questions, please call your primary care clinic.  If you do not have a primary care provider, please call [phone] and they will assist you.        Care EveryWhere ID     This is your Care EveryWhere ID. This could be used by other organizations to access your [facility] medical records  [id number]        Your Vitals Were     Pulse Respirations Height Pulse Oximetry BMI (Body Mass Index)       69 17 1.75 m (5' 8.9\") 98% 23.18 kg/m2        Blood Pressure from Last 3 Encounters:   10/24/18 (!) 145/93   02/09/18 131/84   02/09/18 131/84    Weight from Last 3 Encounters:   10/24/18 71 kg (156 lb 8.4 oz)   02/09/18 72.3 kg (159 lb 4.8 oz)   02/09/18 72.3 kg (159 lb 4.8 oz)              We Performed the Following     RESPIRATORY FLOW VOLUME LOOP          Today's Medication Changes          These changes are accurate as of 10/24/18 11:10 AM.  If you have any questions, ask your nurse or doctor.               These medicines have changed or have updated prescriptions.        Dose/Directions    albuterol 108 (90 Base) MCG/ACT inhaler   Commonly known as:  PROAIR HFA/PROVENTIL HFA/VENTOLIN HFA   This may have changed:    - when to take this  - additional instructions   Used for:  CF (cystic fibrosis) (H), Bronchiectasis without acute exacerbation (H)        Dose:  2 puff "   Inhale 2 puffs into the lungs every 4 hours as needed for shortness of breath / dyspnea or wheezing   Quantity:  3 Inhaler   Refills:  3         Stop taking these medicines if you haven't already. Please contact your care team if you have questions.     oseltamivir 75 MG capsule   Commonly known as:  TAMIFLU   Stopped by:  Siobhan Munoz MD                    Primary Care Provider Office Phone # Fax #    Dorene Mireles [phone]       [place] [facility] [address]  [facility] [address]        Equal Access to Services     [facility]: Hadii aad ku hadasho Soomaali, waaxda luqadaha, qaybta kaalmada adeegyada, waxay kavithain hayannyn gabriel meek . So [facility] [phone].    ATENCIÓN: Si habla español, tiene a babcock disposición servicios gratuitos de asistencia lingüística. [facility] [phone].    We comply with applicable federal civil rights laws and [place] laws. We do not discriminate on the basis of race, color, national origin, age, disability, sex, sexual orientation, or gender identity.            Thank you!     Thank you for choosing [facility]  for your care. Our goal is always to provide you with excellent care. Hearing back from our patients is one way we can continue to improve our services. Please take a few minutes to complete the written survey that you may receive in the mail after your visit with us. Thank you!             Your Updated Medication List - Protect others around you: Learn how to safely use, store and throw away your medicines at www.disposemymeds.org.          This list is accurate as of 10/24/18 11:10 AM.  Always use your most recent med list.                   Brand Name Dispense Instructions for use Diagnosis    albuterol 108 (90 Base) MCG/ACT inhaler    PROAIR HFA/PROVENTIL HFA/VENTOLIN HFA    3 Inhaler    Inhale 2 puffs into the lungs every 4 hours as needed for shortness of breath / dyspnea or wheezing    CF (cystic  fibrosis) (H), Bronchiectasis without acute exacerbation (H)       azithromycin 500 MG tablet    ZITHROMAX    39 tablet    Take 1 tablet (500 mg) by mouth Every Mon, Wed, Fri Morning    Cystic fibrosis with pulmonary manifestations (H), Gastroesophageal reflux disease without esophagitis, Pancreatic insufficiency, Vitamin D deficiency       aztreonam lysine 75 MG Solr    JUDD    84 mL    Take 1 mL (75 mg) by nebulization 3 times daily Use 28 days on, 28 days off.    CF (cystic fibrosis) (H)       hydrochlorothiazide 12.5 MG capsule    MICROZIDE     Take 12.5 mg by mouth daily        lumacaftor-ivacaftor 200-125 MG tablet    ORKAMBI    336 tablet    TAKE 2 TABLETS BY MOUTH EVERY 12 HOURS WITH FAT CONTAINING FOOD. STOREAT ROOM TEMPERATURE.    CF (cystic fibrosis) (H)       multivitamin CF formula chewable tablet     60 tablet    Take 2 tablets by mouth daily    Cystic fibrosis with pulmonary manifestations (H), Gastroesophageal reflux disease without esophagitis, Pancreatic insufficiency, Vitamin D deficiency       pantoprazole 40 MG EC tablet    PROTONIX    180 tablet    TAKE 1 TABLET TWICE A DAY    Gastroesophageal reflux disease without esophagitis, Cystic fibrosis with pulmonary manifestations (H), Pancreatic insufficiency, Vitamin D deficiency       EDU ALTERA NEBULIZER HANDSET Misc     1 each    1 Device 3 times daily 28 days on, 28 days off.    Cystic fibrosis with pulmonary manifestations (H)       EDU ALTERA NEBULIZER SYSTEM Misc     1 each    1 Units 3 times daily 28 days on, 28 days off.    Cystic fibrosis with pulmonary manifestations (H)       ranitidine 150 MG tablet    ZANTAC    60 tablet    Take 1 tablet (150 mg) by mouth 2 times daily    Cystic fibrosis with pulmonary manifestations (H), Gastroesophageal reflux disease without esophagitis, Pancreatic insufficiency, Vitamin D deficiency       sodium chloride inhalant 7 % Nebu neb solution     720 mL    Take 4 mLs by nebulization daily    Cystic  fibrosis with pulmonary manifestations (H), Gastroesophageal reflux disease without esophagitis, Pancreatic insufficiency, Vitamin D deficiency, CF (cystic fibrosis) (H), Abnormal glucose tolerance test, Screening for diabetes mellitus (DM), Screening for hyperlipidemia, Lipid screening       vitamin D 2000 units Caps     60 capsule    Take 2 capsules by mouth daily    Cystic fibrosis with pulmonary manifestations (H), Gastroesophageal reflux disease without esophagitis, Pancreatic insufficiency, Vitamin D deficiency       * ZENPEP 87705-87457 units Cpep   Generic drug:  amylase-lipase-protease     3240 capsule    Take 6 with meals and 3 with snacks. (3 meals and 3 snacks per day)    Cystic fibrosis with pulmonary manifestations (H), Gastroesophageal reflux disease without esophagitis, Pancreatic insufficiency, Vitamin D deficiency, CF (cystic fibrosis) (H), Abnormal glucose tolerance test, Screening for diabetes mellitus (DM), Screening for hyperlipidemia, Lipid screening       * amylase-lipase-protease 03779-15011 units Cpep    ZENPEP    1080 capsule    Take 8 capsules (160,000 Units) by mouth 3 times daily (with meals) Take 4 caps with snacks    Cystic fibrosis with pulmonary manifestations (H)       * Notice:  This list has 2 medication(s) that are the same as other medications prescribed for you. Read the directions carefully, and ask your doctor or other care provider to review them with you.

## 2018-10-24 NOTE — PROGRESS NOTES
Therapy Management:                                                    Michele Altamirano is a 52 year old male coming in for a therapy management visit.  He was referred to me from Dr. Munoz.     Reason for Consult: Symdeko initiation    Discussion: Michele has been on Orkambi for about 2.5 years.  Shortly after he started Orkambi he developed hypertension.  Systolic increased from 118-120 to 140's.  He was eventually started on hydrochlorothiazide 12.5 mg daily by his PCP.    Per Orkambi prescribing information:  In Trials 1 and 2, adverse reactions related to increases in blood pressure (eg, hypertension, blood pressure increased) were reported in 1.1% (4/369) of patients treated with ORKAMBI and in no patients who received placebo.  The proportion of patients who experienced a systolic blood pressure value >140 mm Hg or a diastolic blood pressure >90 mm Hg on at least two occasions was 3.6% and 2.2%, respectively, in patients treated with ORKAMBI compared with 1.6% and 0.5% in patients who received placebo    There have not been reports of increased blood pressure with Symdeko.    Plan:  1. Patient to finish his remaining supply of Orkambi at home.  2. Then start Symdeko 1 tablet every 12 hours with fat-containing food.  3. Patient will monitor his blood pressure at home daily for 2 weeks prior to switching to Symdeko, then will continue to monitor closely for the first 2 months after starting Symdeko.  4.  If blood pressure is low after switching to Symdeko, consider stopping hydrochlorothiazide.  5. Baseline LFTs will be checked today, then every 3 months x 1 year, then annually thereafter.  Patient lives in [facility] [place] and would prefer to have labs done locally.  Asked him to contact CF office with the location where he wants to have labs done so orders can be faxed for future lab draws.  6.  I will follow-up in about 1 month to assess blood pressure and response to Symdeko.    Jael HillD  CF Medication Therapy  Management Pharmacist  [facility] [facility]  [phone]

## 2018-10-24 NOTE — LETTER
10/24/2018       RE: Michele Altamirano  [address] [facility] [address]     Dear Colleague,    Thank you for referring your patient, Michele Altamirano, to the [facility] L[facility]ENCE AND HE[facility] at [facility]. Please see a copy of my visit note below.    [facility]  October 24, 2018         Assessment and Plan:   Michele Altamirano is a 52 year old male with cystic fibrosis.    1. CF lung disease with moderate obstruction:  Michele does report that he has been doing well.  He does feel that his only adverse effect from being on Orkambi therapy is hypertension.  From a pulmonary point of view, he noticed no significant change.  He had been remaining quite active until he had a hamstring pull a couple months ago.  From a pulmonary point of view, he does report that his cough frequency and sputum volume are fairly stable.  He is tolerating inhalational Cayston well.  He only rarely does vest therapy.  Michele continues to show evidence of Pseudomonas and Staph in his sputum.  At this time, I would recommend:   -- That he continue on his present bronchial drainage therapy.   -- He should continue on his Cayston inhalational therapy.     2.  CFTR modulator therapy.  Michele and I did discuss changing to Symdeko from Orkambi.  After conversation with our pharmacist and some education he is wishing to make that change.  He will need to have his liver function tests monitored again every 3 months.  We can certainly arrange to have this done at his local clinic.     3.  Hypertension.  Michele has had worsening of his blood pressure since initiating Orkambi.  He is now on hydrochlorothiazide with fair control.  He was instructed to get a home unit so that he can check his blood pressure during the week.  He will follow it pre-initiation of change to Symdeko and then post.  I did ask him to follow up with his primary care physician regarding increasing his dose  of hydrochlorothiazide.     4.  Hamstring injury.  Michele is managing this without event.  He reports overall it is improved.     5.  History of impaired glucose tolerance.  Michele describes only rare testing of his blood sugars.  When he did test it during a bowel prep recently it was as high as 225 with sugary drinks.  He has no concerning symptoms per him.     6.  Psychosocial.  Michele is .  His wife also works full-time.  They have a son, Raghu, who is 14.  He discussed today how he will probably retire in 4 years' time.  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     7. Pancreatic Insufficiency:  The patient has no new symptoms consistent with worsening malabsorption.    - continue the present dose of pancreatic enzymes  - continue vitamin supplementation.    HCM: 2/2019  Immunizations:Flu shot 2 weeks ago  Colonoscopy: performed this year--diverticular disease    Siobhan Munoz MD MPH  Associate Professor of Medicine  Pulmonary, Allergy, Critical Care and Sleep Medicine      Interval History:     Michele does report that his cough frequency and sputum volume are consistent.  He is always producing green sputum and produces more when he does inhalation of Cayston.  He does try to do Cayston every other month.  He describes only the rare use of the vest.  His activity tolerance does remain quite good.          Review of Systems:     CONSTITUTIONAL: no fever, no chills, no change in weight, no change in energy, no change in appetite    INTEGUMENTARY/SKIN: no rash, no obvious new lesions    ENT/MOUTH: no sore throat, no new sinus pain, no new nasal drainage, no hearing loss, no ear ringing     RESPIRATORY: see interval history    CV: no chest pain, no palpitations, no peripheral edema, no orthopnea, no PND    GI: no nausea, no vomiting, no change in stools, no fatty stools, ++ GERD, no abdominal pain    : no dysuria, no urinary  frequency    MUSCULOSKELETAL: recent ham string pull    ENDOCRINE: no excessive thirst, blood sugars not checked    NEURO:  No headache, no numbness, no tingling    SLEEP: no issues    PSYCHIATRIC: mood stable          Past Medical and Surgical History:     Past Medical History:   Diagnosis Date     CAP (community acquired pneumonia)     2011     CF (cystic fibrosis) (H)     diagnosed 1969, malnutrition, staph empyema     Chronic knee pain      Diverticular disease 2018    see on colonoscopy     GERD (gastroesophageal reflux disease)     egd in the past     Pancreatic insufficiency      Past Surgical History:   Procedure Laterality Date     ENDOSCOPIC SINUS SURGERY      removal of mucocele behind eye, 1992     OPTICAL TRACKING SYSTEM ENDOSCOPIC SINUS SURGERY Bilateral 11/28/2016    Procedure: OPTICAL TRACKING SYSTEM ENDOSCOPIC SINUS SURGERY;  Surgeon: Harriett Cosby MD;  Location: [facility] OR           Family History:     Family History   Problem Relation Age of Onset     Lipids Father      Cardiovascular Father      Diabetes Maternal Grandmother      type 2            Social History:     Social History     Social History     Marital status:      Spouse name: N/A     Number of children: 1     Years of education: N/A     Occupational History           Social History Main Topics     Smoking status: Never Smoker     Smokeless tobacco: Never Used     Alcohol use No     Drug use: No     Sexual activity: Yes     Partners: Female     Birth control/ protection: None     Other Topics Concern     Not on file     Social History Narrative    Nov 2015--Lives in [place] with his wife and 12 yo son.  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            Medications:     Current Outpatient Prescriptions   Medication     albuterol (PROAIR HFA, PROVENTIL HFA, VENTOLIN HFA) 108 (90 BASE) MCG/ACT inhaler     amylase-lipase-protease (ZENPEP) 79954-39325 units CPEP  "    azithromycin (ZITHROMAX) 500 MG tablet     aztreonam lysine (CAYSTON) 75 MG SOLR     Cholecalciferol (VITAMIN D) 2000 UNITS CAPS     hydrochlorothiazide (MICROZIDE) 12.5 MG capsule     lumacaftor-ivacaftor (ORKAMBI) 200-125 MG tablet     multivitamin CF formula (AQUADEKS) chewable tablet     pantoprazole (PROTONIX) 40 MG EC tablet     EDU ALTERA NEBULIZER SYSTEM MISC     ranitidine (ZANTAC) 150 MG tablet     Respiratory Therapy Supplies (EDU ALTERA NEBULIZER HANDSET) MISC     sodium chloride inhalant 7 % NEBU neb solution     ZENPEP 77658 UNITS CPEP per EC capsule     No current facility-administered medications for this visit.             Physical Exam:   BP (!) 145/93  Pulse 69  Resp 17  Ht 1.75 m (5' 8.9\")  Wt 71 kg (156 lb 8.4 oz)  SpO2 98%  BMI 23.18 kg/m2    Constitutional:   Awake, alert and in no apparent distress     Eyes:   nonicteric     ENT:   oral mucosa moist without lesions, normal tm bilaterally, bilateral mucosal edema      Neck:   Supple without supraclavicular or cervical lymphadenopathy     Lungs:   Good air flow.  No crackles. No rhonchi.  No wheezes.     Cardiovascular:   Normal S1 and S2.  RRR.  No murmur, gallop or rub.     Abdomen:   NABS, soft, nontender, nondistended.       Musculoskeletal:   No edema, digital clubbing present     Neurologic:   Alert and conversant.     Skin:   Warm, dry.  No rash on limited exam.             Data:   All laboratory and imaging data reviewed.    Cystic Fibrosis Culture  Specimen Description   Date Value Ref Range Status   02/09/2018 Sputum  Final   12/22/2017 Sputum  Final   01/12/2016 Sputum  Final   01/12/2016 Sputum  Final   01/12/2016 Sputum  Final    Culture Micro   Date Value Ref Range Status   02/09/2018 Moderate growth  Normal tessa    Final   02/09/2018 (A)  Final    Moderate growth  Pseudomonas aeruginosa, mucoid strain     02/09/2018 Moderate growth  Pseudomonas aeruginosa   (A)  Final   02/09/2018 Light growth  Staphylococcus " aureus   (A)  Final        Recent Results (from the past 168 hour(s))   General PFT Lab (Please always keep checked)    Collection Time: 10/24/18  8:35 AM   Result Value Ref Range    FVC-Pred 4.74 L    FVC-Pre 3.82 L    FVC-%Pred-Pre 80 %    FEV1-Pre 2.51 L    FEV1-%Pred-Pre 67 %    FEV1FVC-Pred 77 %    FEV1FVC-Pre 66 %    FEFMax-Pred 9.48 L/sec    FEFMax-Pre 7.82 L/sec    FEFMax-%Pred-Pre 82 %    FEF2575-Pred 3.37 L/sec    FEF2575-Pre 1.35 L/sec    RDZ5815-%Pred-Pre 39 %    ExpTime-Pre 10.83 sec    FIFMax-Pre 7.43 L/sec    FEV1FEV6-Pred 80 %    FEV1FEV6-Pre 70 %       PFT: Moderate obstructive lung disease.  When compared to 2/90/2018, the FEV1 and FVC have little change.  The decrease in FVC may represent air trapping v. restrictive physiology.  Lung volumes would be necessary to determine.    Pulmonary exacerbation: absent        Visit Refusal    Pt due for annual nutrition assessment however refused dietitian visit today during his clinic appt.  Will attempt at next follow-up pending pt's availability.       Alondra Vitale MS, RD, LD (pager [phone])  Cystic Fibrosis/Lung Transplant Dietitian      -Available Mon-Thurs 8 AM-4:30 PM. On Fridays please contact pager [phone] (Willa Romero RD) and on weekends/holidays contact coverage dietitian at pager [phone] (inpatient use only).     CF screen for PT needs    Symptoms of urinary incontinence: none reported   Exercise regimen (aerobic or resistance)/ daily activity: Since hamstring injury in May, is only exercising 1x/wk on bike for 5-10 miles.  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  Reports being active his whole life and attributes this, in part, to stable lung function.   CF related pain limiting participation in daily activities: Long standing headaches and back pain, pt thinks related to poor posture.    Gross postural impairment: Moderate forward head, decrease lumbar lordosis, moderately protracted  shoulder. Upon palpation, lumbar paraspinals are very tight.     Recommends pt see OP PT for more regular ongoing treatment.  Pt is on board.  OP PT to improve posture, cervicogenic headaches, back pain and hamstring injury.       Siobhan Munoz MD

## 2018-10-24 NOTE — MR AVS SNAPSHOT
After Visit Summary   10/24/2018    Michele Altamirano    MRN: [id number]           Patient Information     Date Of Birth          1965        Visit Information        Provider Department      10/24/2018 9:30 AM Sophia Arvizu, PharmD [facility] Cystic Fibrosis [facility]        Today's Diagnoses     Cystic fibrosis    -  1       Follow-ups after your visit        Future tests that were ordered for you today     Open Future Orders        Priority Expected Expires Ordered    Cystic Fibrosis Culture Aerob Bacterial Routine  10/24/2019 10/24/2018            Who to contact     If you have questions or need follow up information about today's clinic visit or your schedule please contact [facility] CYSTIC FIBROSIS [facility] ADULT CLINIC directly at [phone].  Normal or non-critical lab and imaging results will be communicated to you by [employer]thart, letter or phone within 4 business days after the clinic has received the results. If you do not hear from us within 7 days, please contact the clinic through Hamstersofthart or phone. If you have a critical or abnormal lab result, we will notify you by phone as soon as possible.  Submit refill requests through [employer] or call your pharmacy and they will forward the refill request to us. Please allow 3 business days for your refill to be completed.          Additional Information About Your Visit        MyChart Information     [employer] gives you secure access to your electronic health record. If you see a primary care provider, you can also send messages to your care team and make appointments. If you have questions, please call your primary care clinic.  If you do not have a primary care provider, please call [phone] and they will assist you.        Care EveryWhere ID     This is your Care EveryWhere ID. This could be used by other organizations to access your [facility] medical records  [id number]         Blood Pressure from Last 3  Encounters:   10/24/18 (!) 145/93   02/09/18 131/84   02/09/18 131/84    Weight from Last 3 Encounters:   10/24/18 156 lb 8.4 oz (71 kg)   02/09/18 159 lb 4.8 oz (72.3 kg)   02/09/18 159 lb 4.8 oz (72.3 kg)              Today, you had the following     No orders found for display         Today's Medication Changes          These changes are accurate as of 10/24/18 10:32 AM.  If you have any questions, ask your nurse or doctor.               These medicines have changed or have updated prescriptions.        Dose/Directions    albuterol 108 (90 Base) MCG/ACT inhaler   Commonly known as:  PROAIR HFA/PROVENTIL HFA/VENTOLIN HFA   This may have changed:    - when to take this  - additional instructions   Used for:  CF (cystic fibrosis) (H), Bronchiectasis without acute exacerbation (H)        Dose:  2 puff   Inhale 2 puffs into the lungs every 4 hours as needed for shortness of breath / dyspnea or wheezing   Quantity:  3 Inhaler   Refills:  3         Stop taking these medicines if you haven't already. Please contact your care team if you have questions.     oseltamivir 75 MG capsule   Commonly known as:  TAMIFLU   Stopped by:  Siobhan Munoz MD                    Primary Care Provider Office Phone # Fax #    Dorene Mireles [phone]       [place] [facility] [address] [facility] [address]        Equal Access to Services     [facility]: Hadii aad ku hadasho Sogurjit, waaxda luqadaha, qaybta kaalmada adedanielyada, samantha meek . So [facility] [phone].    ATENCIÓN: Si habla español, tiene a babcock disposición servicios gratuitos de asistencia lingüística. Llame al [phone].    We comply with applicable federal civil rights laws and [place] laws. We do not discriminate on the basis of race, color, national origin, age, disability, sex, sexual orientation, or gender identity.            Thank you!     Thank you for choosing [facility]ST[facility] FIBROSIS CENTER [facility]  ADULT CLINIC  for your care. Our goal is always to provide you with excellent care. Hearing back from our patients is one way we can continue to improve our services. Please take a few minutes to complete the written survey that you may receive in the mail after your visit with us. Thank you!             Your Updated Medication List - Protect others around you: Learn how to safely use, store and throw away your medicines at www.disposemymeds.org.          This list is accurate as of 10/24/18 10:32 AM.  Always use your most recent med list.                   Brand Name Dispense Instructions for use Diagnosis    albuterol 108 (90 Base) MCG/ACT inhaler    PROAIR HFA/PROVENTIL HFA/VENTOLIN HFA    3 Inhaler    Inhale 2 puffs into the lungs every 4 hours as needed for shortness of breath / dyspnea or wheezing    CF (cystic fibrosis) (H), Bronchiectasis without acute exacerbation (H)       azithromycin 500 MG tablet    ZITHROMAX    39 tablet    Take 1 tablet (500 mg) by mouth Every Mon, Wed, Fri Morning    Cystic fibrosis with pulmonary manifestations (H), Gastroesophageal reflux disease without esophagitis, Pancreatic insufficiency, Vitamin D deficiency       aztreonam lysine 75 MG Solr    [facility]    84 mL    Take 1 mL (75 mg) by nebulization 3 times daily Use 28 days on, 28 days off.    CF (cystic fibrosis) (H)       hydrochlorothiazide 12.5 MG capsule    MICROZIDE     Take 12.5 mg by mouth daily        lumacaftor-ivacaftor 200-125 MG tablet    ORKAMBI    336 tablet    TAKE 2 TABLETS BY MOUTH EVERY 12 HOURS WITH FAT CONTAINING FOOD. STOREAT ROOM TEMPERATURE.    CF (cystic fibrosis) (H)       multivitamin CF formula chewable tablet     60 tablet    Take 2 tablets by mouth daily    Cystic fibrosis with pulmonary manifestations (H), Gastroesophageal reflux disease without esophagitis, Pancreatic insufficiency, Vitamin D deficiency       pantoprazole 40 MG EC tablet    PROTONIX    180 tablet    TAKE 1 TABLET TWICE A DAY     Gastroesophageal reflux disease without esophagitis, Cystic fibrosis with pulmonary manifestations (H), Pancreatic insufficiency, Vitamin D deficiency       EDU ALTERA NEBULIZER HANDSET Misc     1 each    1 Device 3 times daily 28 days on, 28 days off.    Cystic fibrosis with pulmonary manifestations (H)       EDU ALTERA NEBULIZER SYSTEM Misc     1 each    1 Units 3 times daily 28 days on, 28 days off.    Cystic fibrosis with pulmonary manifestations (H)       ranitidine 150 MG tablet    ZANTAC    60 tablet    Take 1 tablet (150 mg) by mouth 2 times daily    Cystic fibrosis with pulmonary manifestations (H), Gastroesophageal reflux disease without esophagitis, Pancreatic insufficiency, Vitamin D deficiency       sodium chloride inhalant 7 % Nebu neb solution     720 mL    Take 4 mLs by nebulization daily    Cystic fibrosis with pulmonary manifestations (H), Gastroesophageal reflux disease without esophagitis, Pancreatic insufficiency, Vitamin D deficiency, CF (cystic fibrosis) (H), Abnormal glucose tolerance test, Screening for diabetes mellitus (DM), Screening for hyperlipidemia, Lipid screening       vitamin D 2000 units Caps     60 capsule    Take 2 capsules by mouth daily    Cystic fibrosis with pulmonary manifestations (H), Gastroesophageal reflux disease without esophagitis, Pancreatic insufficiency, Vitamin D deficiency       * ZENPEP 72200-93159 units Cpep   Generic drug:  amylase-lipase-protease     3240 capsule    Take 6 with meals and 3 with snacks. (3 meals and 3 snacks per day)    Cystic fibrosis with pulmonary manifestations (H), Gastroesophageal reflux disease without esophagitis, Pancreatic insufficiency, Vitamin D deficiency, CF (cystic fibrosis) (H), Abnormal glucose tolerance test, Screening for diabetes mellitus (DM), Screening for hyperlipidemia, Lipid screening       * amylase-lipase-protease 04585-39048 units Cpep    ZENPEP    1080 capsule    Take 8 capsules (160,000 Units) by mouth 3 times  daily (with meals) Take 4 caps with snacks    Cystic fibrosis with pulmonary manifestations (H)       * Notice:  This list has 2 medication(s) that are the same as other medications prescribed for you. Read the directions carefully, and ask your doctor or other care provider to review them with you.

## 2018-10-24 NOTE — PROGRESS NOTES
Visit Refusal    Pt due for annual nutrition assessment however refused dietitian visit today during his clinic appt.  Will attempt at next follow-up pending pt's availability.       Alondra Vitale MS, RD, LD (pager [phone])  Cystic Fibrosis/Lung Transplant Dietitian      -Available Mon-Thurs 8 AM-4:30 PM. On Fridays please contact pager [phone] (Willa Romero RD) and on weekends/holidays contact coverage dietitian at pager [phone] (inpatient use only).

## 2018-10-25 ENCOUNTER — CARE COORDINATION (OUTPATIENT)
Dept: PULMONOLOGY | Facility: CLINIC | Age: 53
End: 2018-10-25

## 2018-10-25 NOTE — PROGRESS NOTES
Pt called per Dr. Munoz request to get LFTs done locally. Pt stated he will have them done tomorrow. Orders faxed to [facility].

## 2018-10-29 ENCOUNTER — TELEPHONE (OUTPATIENT)
Dept: PULMONOLOGY | Facility: CLINIC | Age: 53
End: 2018-10-29

## 2018-11-01 NOTE — PROGRESS NOTES
"Adult Cystic Fibrosis Program  Annual Psychosocial Assessment    Presenting Information:  Michele is a 53-year-old male with cystic fibrosis who presents in CF clinic today for a routine appointment with primary CF provider, Dr. Munoz.  His last visit to clinic was Feb 2018, he reports that he typically comes to clinic twice per year and stated: \"I don't come as often as I'm supposed to\".   Met with Michele to complete an updated annual psychosocial assessment.     Living situation:  Michele lives with his wife Sherie and son Raghu in [place], [place].  They own their own home.  Michele and his family have one cat, Onur. He denies any concerns about his living situation.      Family Constellation:  Michele was raised by his biological parents and is the youngest of 4 children.  He has 3 brothers, all of whom are  with children. Michele noted previously that none of his brothers are CF-carriers. Michele's parents live in [place], [place], where he grew up. Two of his brothers remain in [place], and the other lives in [place]. His mother and brothers are doing well, but his father has Alzheimer's.    Michele and Sherie met in college and have been  for 20 years. Michele and his wife have one 14-year-old son, Raghu, whom they adopted from [place] when he was 3 years old. They would have considered adopting another son from [place] but were unable to proceed due to bureaucratic issues. Sherie's parents live in [place].  He reports that Raghu is doing great and loves and is very good at sports, he is currently involved with soccer year-round.      Social Support:  Michele reports good social support.  He gets along well with family members and draws additional support from friends and co-workers.  He has never had any connections in the CF Community.    Adjustment to Illness:  Michele was diagnosed with CF at age 4. He knows that he was very ill prior to his diagnosis but has no memory of that time. Once he was diagnosed and received therapy, his " "health stabilized, and he had very few complications. He recalls his parents being diligent with treatments but also not following some of the conventions through allowing him to be active in sports and not limiting fat in his diet. Michele noted that most people in high school had no idea he had CF. He has never been admitted to the hospital for CF. He had received care through the [facility] but transferred care to [facility] in 2011.   Michele describes his current health status as \"good\" and feels he has been able to maintain stability with his health. Michele reports that he was asymptomatic until about 8 years ago. He reports that he does not vest very often, he will vest a few times if he feels \"different\" such as getting a cold but reports the main reason he does not vest is because he looks at the time it takes vs the benefit.  Michele is very active through biking, running and playing sports with his son, although he has had to scale this back due to a recent hamstring injury.  Michele continues to take Orkambi but will switch to Symdeko when his last supply of.  Since Michele has remained very stable, he believes that his current regimen works well for him.  He denies any physical health symptoms that interfere with daily activities.   Michele is very private about his CF diagnosis, his wife and very close, long-time friends are aware but he shared that his son does not know and his employer/co-workers do not know.  He said that he has not told his son yet because he is a \"very immature 14 year old\", he did acknowledge that they will need to tell Raghu some day but they haven't needed to yet.  He did say that he is surprised Raghu hasn't asked him about why he takes so many pills, but we discussed that this is all he has known.  Since Michele rarely vests he hasn't had an issue of Raghu seeing this.  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  " "    Health Care Directive:  Michele has previously received Health Care Directive education.  This SW reviewed education, including concept/purpose of health care directive, default health care agents and how to complete a directive.  Michele is comfortable with his default health care agent in absence of a directive (wife).  Michele and his wife have had conversations regarding Michele's health care preferences, hopes and wishes.      Education:  Michele completed two Bachelor's degrees in business and mechanical engineering.    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 He enjoys his job and reports a supportive work place, although they are not aware of CF diagnosis and he does not plan to tell them. He has worked there for 22 years.  He travels about monthly for his job.  He has access to employer-based benefits, including health insurance, short and long-term disability, however he has insurance through Sherie's employer through [employer].  He hopes to retire in about 5 years.  He denies any employment concerns at this time.     Sherie is an  and works full-time for [employer].  She works from home full-time.     Finances:  Michele and Sherie receive income through wages and he denies any financial concerns.     Insurance:  Michele has [employer] insurance through Sherie's employer.  Today, Michele had questions about insurance options for when he retires as he plans to do this prior to age 66. Provided him with education the current options, but informed him these could likely change. Also, discussed SSDI and sent an e-mail with information of CF-specific disability eligibility criteria and information for the CF legal hotline.    Mental Health/Coping:  Michele denies any current or past symptoms indicative of mood, anxiety, eating, learning or other mental health disorder. He identifies himself as a \"pretty happy go henry hunter\".  He has " "never been under the care of a mental health provider.  He generally cheryl with stressors through enjoying time with his family, exercising and engaging in his hobbies. Michele did note he has difficulty sleeping but attributes this to staying up late and working on his hobbies.     Michele declined completing the PHQ-9/LUIS DANIEL-7 today indicating that it is \"non-applicable\" to him.      Chemical Health:  Michele denies tobacco or illicit drug use.  He occasionally drinks alcohol during social events at work because he feels like it's part of his job, however he usually limits this to one beer.      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  He also reports having many hobbies/interests such as photography, brewing beer and restoring old cars.       Intervention:  -Psychosocial Assessment  -Mental health screening   -Health care directive education review  -Insurance/SSDI education     Assessment:  Michele appeared to be open in his responses. His psychosocial situation has remained largely the same since his last clinic visit.  Michele continues to report stability with his physical and mental health. Michele seems to be psychosocially stable overall, with access to relevant resources and supports.  No concerns expressed/noted. Recommend f/u as noted in the plan below.     Plan:  - Address psychosocial concerns as they may arise.   - Complete psychosocial assessment annually.    J CARLOS Hidalgo, [facility]  Adult Cystic Fibrosis   Ph: [phone]  Pager: [phone]         "

## 2018-11-02 DIAGNOSIS — E84.9 CF (CYSTIC FIBROSIS) (H): Primary | ICD-10-CM

## 2018-11-05 ENCOUNTER — TELEPHONE (OUTPATIENT)
Dept: PULMONOLOGY | Facility: CLINIC | Age: 53
End: 2018-11-05

## 2018-11-05 NOTE — TELEPHONE ENCOUNTER
Prior Authorization Approval    Authorization Effective Date: 11/5/2018  Authorization Expiration Date: 12/31/2039  Medication: Symdeko- PA Approved   Approved Dose/Quantity: Qty 56/28ds  Reference #: PA# [id number]   Insurance Company: [employer] - [employer] [phone] Fax [phone]  Expected CoPay: $75     CoPay Card Available: Yes   Foundation Assistance Needed:    Which Pharmacy is filling the prescription (Not needed for infusion/clinic administered): [facility] - [place], [place] - [facility] [address]  Pharmacy Notified: Yes  Patient Notified: No

## 2018-12-10 ENCOUNTER — ALLIED HEALTH/NURSE VISIT (OUTPATIENT)
Dept: PHARMACY | Facility: CLINIC | Age: 53
End: 2018-12-10
Payer: COMMERCIAL

## 2018-12-10 DIAGNOSIS — E84.9 CYSTIC FIBROSIS (H): Primary | ICD-10-CM

## 2018-12-10 PROCEDURE — 99207 ZZC NO CHARGE LOS: CPT | Performed by: PHARMACIST

## 2018-12-10 NOTE — PROGRESS NOTES
Clinical Consult:                                                    Michele Altamirano is a 53 year old male called for a clinical pharmacist consult.     Reason for Consult: Follow-up on Symdeko initiation and blood pressure results.    Discussion: Michele recently switched from Orkambi to Symdeko.  He had high blood pressure while on Orkambi and Orkambi may have been causing or contributing his high blood pressure.  Called patient to ask how he is tolerating Symdeko and if he has noticed any changes to his blood pressure yet.      Michele just started the Symdeko on December 1st.  So far he has not noticed any obvious decrease in his blood pressure in the first few days.  He has checked his BP a couple times this week; many are below 135/85 but there is a large variability in the readings based on the time of day, eating, coffee, etc.      Plan:  Michele will begin monitoring his blood pressure more consistently, once daily at the same time each day.  I will follow-up again in a couple weeks to re-assess blood pressure.      Jael Arvizu PharmD  CF Medication Therapy Management Pharmacist  [facility]  [phone]

## 2019-02-04 DIAGNOSIS — E84.9 CF (CYSTIC FIBROSIS) (H): ICD-10-CM

## 2019-03-06 DIAGNOSIS — E84.0 CYSTIC FIBROSIS WITH PULMONARY MANIFESTATIONS (H): ICD-10-CM

## 2019-03-06 DIAGNOSIS — E55.9 VITAMIN D DEFICIENCY: ICD-10-CM

## 2019-03-06 DIAGNOSIS — K86.89 PANCREATIC INSUFFICIENCY: ICD-10-CM

## 2019-03-06 DIAGNOSIS — Z13.220 LIPID SCREENING: ICD-10-CM

## 2019-03-06 DIAGNOSIS — R73.09 ABNORMAL GLUCOSE TOLERANCE TEST: ICD-10-CM

## 2019-03-06 DIAGNOSIS — E84.9 CF (CYSTIC FIBROSIS) (H): ICD-10-CM

## 2019-03-06 DIAGNOSIS — Z13.220 SCREENING FOR HYPERLIPIDEMIA: ICD-10-CM

## 2019-03-06 DIAGNOSIS — K21.9 GASTROESOPHAGEAL REFLUX DISEASE WITHOUT ESOPHAGITIS: ICD-10-CM

## 2019-03-06 DIAGNOSIS — Z13.1 SCREENING FOR DIABETES MELLITUS (DM): ICD-10-CM

## 2019-03-06 RX ORDER — AZITHROMYCIN 500 MG/1
500 TABLET, FILM COATED ORAL
Qty: 39 TABLET | Refills: 3 | Status: SHIPPED | OUTPATIENT
Start: 2019-03-06 | End: 2020-03-05

## 2019-03-06 RX ORDER — SODIUM CHLORIDE FOR INHALATION 7 %
4 VIAL, NEBULIZER (ML) INHALATION DAILY
Qty: 720 ML | Refills: 3 | Status: SHIPPED | OUTPATIENT
Start: 2019-03-06 | End: 2020-03-09

## 2019-04-03 DIAGNOSIS — E84.9 CF (CYSTIC FIBROSIS) (H): ICD-10-CM

## 2019-04-04 RX ORDER — NEBULIZER
EACH MISCELLANEOUS
Qty: 1 EACH | Refills: 5 | Status: SHIPPED | OUTPATIENT
Start: 2019-04-04 | End: 2020-02-06

## 2019-04-11 DIAGNOSIS — E84.9 CF (CYSTIC FIBROSIS) (H): Primary | ICD-10-CM

## 2019-04-16 NOTE — PROGRESS NOTES
[facility]  [facility]  April 17, 2019         Assessment and Plan:   Michele Altamirano is a 53 year old male with cystic fibrosis.    1. CF lung disease with moderate obstruction:  Michele presents today reporting that he is feeling well.  He feels from a pulmonary point of view that he has no changes in his symptomatology.  He also shows no evidence of change in pulmonary function.  He historically has grown out pseudomonas in his sputum.  Michele has never consistently participated in bronchial drainage.  At this time, I would recommend:   -- Michele does continue to do his inhalational therapy.  He does do this 2-3 times per day.   -- He should continue with Cayston every other month.   -- He should continue to use his vest prn.  In the past we tried to encourage him to be more consistent but he has been resistant to this.     2.  CFTR modulator therapy.  Michele remains on Symdeko therapy and he has no evident complications.  He has not had recent liver function studies and I asked that he do this when he gets his blood drawn by his primary care physician in about a weeks' time.  He is agreeable to that plan.     3.  Abnormal glucose tolerance.  Michele has shown evidence of abnormal glucose tolerance, but has been resistant to pursuing evaluation and treatment.  Again, I defer to work with his primary care physician regarding this.     4.  Cystic fibrosis sinus disease.  Michele in the past has had some symptomatology.  Today he reports his symptoms are stable.     5.  Psychosocial.  Michele continues to work full-time and is enjoying his work very much.  He is  and has a son.  He plans to soccer again and he feels he is able to keep up is the [id number].  He has not been able to exercise as often as he would like to because of a recent hamstring injury.     6. Pancreatic Insufficiency:  The patient has no new symptoms consistent with worsening malabsorption.    - continue the  present dose of pancreatic enzymes  - continue vitamin supplementation.    HCM: due now  Immunizations: UTD  Colonoscopy: 6/2018    Siobhan Munoz MD MPH  Associate Professor of Medicine  Pulmonary, Allergy, Critical Care and Sleep Medicine      Interval History:     Michele does report that he does very little coughing but does a fair amount of throat clearing.  He does use his albuterol inhaler 2-3 times per day.  He does continue to be consistent with his Cayston use.  He does feel however that his Cayston does not work as well as it used to.  He used to be much more productive during those months.  He reports that he rarely is using his vest.  His activity tolerance remains excellent.          Review of Systems:     CONSTITUTIONAL: no fever, no chills, no change in weight, no change in energy, no change in appetite    INTEGUMENTARY/SKIN: no rash, no obvious new lesions    ENT/MOUTH: no sore throat, no new sinus pain, no new nasal drainage, no hearing loss, no ear ringing     RESPIRATORY: see interval history    CV: no chest pain, no palpitations, no peripheral edema, no orthopnea, no PND    GI: no nausea, no vomiting, no change in stools, no fatty stools, no change in GERD, no abdominal pain    : negative    MUSCULOSKELETAL: hamstring injury    NEURO:  No headache, no numbness, no tingling    SLEEP: no issues--not enough needs his coffee    PSYCHIATRIC: mood good          Past Medical and Surgical History:     Past Medical History:   Diagnosis Date     CAP (community acquired pneumonia)     2011     CF (cystic fibrosis) (H)     diagnosed 1969, malnutrition, staph empyema     Chronic knee pain      Diverticular disease 2018    see on colonoscopy     GERD (gastroesophageal reflux disease)     egd in the past     Pancreatic insufficiency      Past Surgical History:   Procedure Laterality Date     ENDOSCOPIC SINUS SURGERY      removal of mucocele behind eye, 1992     OPTICAL TRACKING SYSTEM ENDOSCOPIC SINUS  SURGERY Bilateral 11/28/2016    Procedure: OPTICAL TRACKING SYSTEM ENDOSCOPIC SINUS SURGERY;  Surgeon: Harriett Cosby MD;  Location: [facility] OR           Family History:     Family History   Problem Relation Age of Onset     Lipids Father      Cardiovascular Father      Diabetes Maternal Grandmother         type 2            Social History:     Social History     Socioeconomic History     Marital status:      Spouse name: Not on file     Number of children: 1     Years of education: Not on file     Highest education level: Not on file   Occupational History     Occupation:    Social Needs     Financial resource strain: Not on file     Food insecurity:     Worry: Not on file     Inability: Not on file     Transportation needs:     Medical: Not on file     Non-medical: Not on file   Tobacco Use     Smoking status: Never Smoker     Smokeless tobacco: Never Used   Substance and Sexual Activity     Alcohol use: No     Alcohol/week: 0.0 oz     Drug use: No     Sexual activity: Yes     Partners: Female     Birth control/protection: None   Lifestyle     Physical activity:     Days per week: Not on file     Minutes per session: Not on file     Stress: Not on file   Relationships     Social connections:     Talks on phone: Not on file     Gets together: Not on file     Attends Pentecostalism service: Not on file     Active member of club or organization: Not on file     Attends meetings of clubs or organizations: Not on file     Relationship status: Not on file     Intimate partner violence:     Fear of current or ex partner: Not on file     Emotionally abused: Not on file     Physically abused: Not on file     Forced sexual activity: Not on file   Other Topics Concern     Parent/sibling w/ CABG, MI or angioplasty before 65F 55M? Not Asked   Social History Narrative    Nov 2015--Lives in [place] with his wife and 12 yo son.  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            Medications:     Current Outpatient Medications   Medication     albuterol (PROAIR HFA, PROVENTIL HFA, VENTOLIN HFA) 108 (90 BASE) MCG/ACT inhaler     amylase-lipase-protease (ZENPEP) 12184-43635 units CPEP     azithromycin (ZITHROMAX) 500 MG tablet     Aztreonam Lysine (CAYSTON IN)     Cholecalciferol (VITAMIN D) 2000 UNITS CAPS     hydrochlorothiazide (MICROZIDE) 12.5 MG capsule     multivitamin CF formula (AQUADEKS) chewable tablet     pantoprazole (PROTONIX) 40 MG EC tablet     EDU ALTERA NEBULIZER SYSTEM MISC     ranitidine (ZANTAC) 150 MG tablet     Respiratory Therapy Supplies (EDU ALTERA NEBULIZER HANDSET) MISC     Respiratory Therapy Supplies (EDU ALTERA NEBULIZER HANDSET) MISC     sodium chloride inhalant 7 % NEBU neb solution     tezacaftor-ivacaftor & ivacaftor (SYMDEKO) 100-150 & 150 mg tablet pack     ZENPEP 96075 UNITS CPEP per EC capsule     No current facility-administered medications for this visit.             Physical Exam:   /77   Pulse 56   Resp 18   Ht 1.75 m (5' 8.9\")   Wt 73.5 kg (162 lb)   SpO2 95%   BMI 23.99 kg/m      Constitutional:   Awake, alert and in no apparent distress     Eyes:   nonicteric     ENT:   oral mucosa moist without lesions, normal tm bilaterally, bilateral mucosa normal     Neck:   Supple without supraclavicular or cervical lymphadenopathy     Lungs:   Good air flow.  No crackles. No rhonchi.  No wheezes.     Cardiovascular:   Normal S1 and S2.  RRR.  No murmur, gallop or rub.     Abdomen:   NABS, soft, nontender, nondistended.       Musculoskeletal:   No edema, digital clubbing present     Neurologic:   Alert and conversant.     Skin:   Warm, dry.  No rash on limited exam.             Data:   All laboratory and imaging data reviewed.    Cystic Fibrosis Culture  Specimen Description   Date Value Ref Range Status   02/09/2018 Sputum  Final   12/22/2017 Sputum  Final   01/12/2016 Sputum  Final   01/12/2016 Sputum  Final   01/12/2016 " Sputum  Final    Culture Micro   Date Value Ref Range Status   02/09/2018 Moderate growth  Normal tessa    Final   02/09/2018 (A)  Final    Moderate growth  Pseudomonas aeruginosa, mucoid strain     02/09/2018 Moderate growth  Pseudomonas aeruginosa   (A)  Final   02/09/2018 Light growth  Staphylococcus aureus   (A)  Final        Recent Results (from the past 168 hour(s))   General PFT Lab (Please always keep checked)    Collection Time: 04/17/19  9:06 AM   Result Value Ref Range    FVC-Pred 4.74 L    FVC-Pre 3.91 L    FVC-%Pred-Pre 82 %    FEV1-Pre 2.55 L    FEV1-%Pred-Pre 68 %    FEV1FVC-Pred 79 %    FEV1FVC-Pre 65 %    FEFMax-Pred 9.48 L/sec    FEFMax-Pre 7.79 L/sec    FEFMax-%Pred-Pre 82 %    FEF2575-Pred 3.37 L/sec    FEF2575-Pre 1.25 L/sec    FKW4039-%Pred-Pre 37 %    ExpTime-Pre 11.20 sec    FIFMax-Pre 6.52 L/sec    FEV1FEV6-Pred 80 %    FEV1FEV6-Pre 69 %       PFT: Moderate obstructive lung disease.  When compared to 10/24/2018, the FEV1 and FVC have little change.  The decrease in FVC may represent air trapping v. restrictive physiology.  Lung volumes would be necessary to determine.    Pulmonary exacerbation: absent

## 2019-04-17 ENCOUNTER — HOSPITAL ENCOUNTER (OUTPATIENT)
Dept: PHYSICAL THERAPY | Facility: CLINIC | Age: 54
Setting detail: THERAPIES SERIES
End: 2019-04-17
Attending: INTERNAL MEDICINE
Payer: COMMERCIAL

## 2019-04-17 ENCOUNTER — OFFICE VISIT (OUTPATIENT)
Dept: PULMONOLOGY | Facility: CLINIC | Age: 54
End: 2019-04-17
Attending: INTERNAL MEDICINE
Payer: COMMERCIAL

## 2019-04-17 VITALS
WEIGHT: 162 LBS | BODY MASS INDEX: 23.99 KG/M2 | DIASTOLIC BLOOD PRESSURE: 77 MMHG | SYSTOLIC BLOOD PRESSURE: 131 MMHG | HEIGHT: 69 IN | HEART RATE: 56 BPM | RESPIRATION RATE: 18 BRPM | OXYGEN SATURATION: 95 %

## 2019-04-17 DIAGNOSIS — E84.9 CYSTIC FIBROSIS (H): Primary | ICD-10-CM

## 2019-04-17 LAB
EXPTIME-PRE: 11.2 SEC
FEF2575-%PRED-PRE: 37 %
FEF2575-PRE: 1.25 L/SEC
FEF2575-PRED: 3.37 L/SEC
FEFMAX-%PRED-PRE: 82 %
FEFMAX-PRE: 7.79 L/SEC
FEFMAX-PRED: 9.48 L/SEC
FEV1-%PRED-PRE: 68 %
FEV1-PRE: 2.55 L
FEV1FEV6-PRE: 69 %
FEV1FEV6-PRED: 80 %
FEV1FVC-PRE: 65 %
FEV1FVC-PRED: 79 %
FIFMAX-PRE: 6.52 L/SEC
FVC-%PRED-PRE: 82 %
FVC-PRE: 3.91 L
FVC-PRED: 4.74 L

## 2019-04-17 PROCEDURE — 97530 THERAPEUTIC ACTIVITIES: CPT | Mod: GP

## 2019-04-17 PROCEDURE — 97110 THERAPEUTIC EXERCISES: CPT | Mod: GP

## 2019-04-17 PROCEDURE — 40000185 ZZHC STATISTIC PT OUTPT VISIT

## 2019-04-17 PROCEDURE — G0463 HOSPITAL OUTPT CLINIC VISIT: HCPCS | Mod: ZF

## 2019-04-17 PROCEDURE — 97161 PT EVAL LOW COMPLEX 20 MIN: CPT | Mod: GP

## 2019-04-17 ASSESSMENT — PAIN SCALES - GENERAL: PAINLEVEL: NO PAIN (0)

## 2019-04-17 ASSESSMENT — MIFFLIN-ST. JEOR: SCORE: 1573.62

## 2019-04-17 NOTE — LETTER
4/17/2019       RE: Michele Altamirano  [address] [address]     Dear Colleague,    Thank you for referring your patient, Michele Altamirano, to the [facility] at [facility]. Please see a copy of my visit note below.    [facility]  April 17, 2019         Assessment and Plan:   Michele Altamirano is a 53 year old male with cystic fibrosis.    1. CF lung disease with moderate obstruction:  Michele presents today reporting that he is feeling well.  He feels from a pulmonary point of view that he has no changes in his symptomatology.  He also shows no evidence of change in pulmonary function.  He historically has grown out pseudomonas in his sputum.  Michele has never consistently participated in bronchial drainage.  At this time, I would recommend:   -- Michele does continue to do his inhalational therapy.  He does do this 2-3 times per day.   -- He should continue with Cayston every other month.   -- He should continue to use his vest prn.  In the past we tried to encourage him to be more consistent but he has been resistant to this.     2.  CFTR modulator therapy.  Michele remains on Symdeko therapy and he has no evident complications.  He has not had recent liver function studies and I asked that he do this when he gets his blood drawn by his primary care physician in about a weeks' time.  He is agreeable to that plan.     3.  Abnormal glucose tolerance.  Michele has shown evidence of abnormal glucose tolerance, but has been resistant to pursuing evaluation and treatment.  Again, I defer to work with his primary care physician regarding this.     4.  Cystic fibrosis sinus disease.  Michele in the past has had some symptomatology.  Today he reports his symptoms are stable.     5.  Psychosocial.  Michele continues to work full-time and is enjoying his work very much.  He is  and has a son.  He plans to soccer again  and he feels he is able to keep up is the [id number]-ybhm.  He has not been able to exercise as often as he would like to because of a recent hamstring injury.     6. Pancreatic Insufficiency:  The patient has no new symptoms consistent with worsening malabsorption.    - continue the present dose of pancreatic enzymes  - continue vitamin supplementation.    HCM: due now  Immunizations: UTD  Colonoscopy: 6/2018    Siobhan Munoz MD MPH  Associate Professor of Medicine  Pulmonary, Allergy, Critical Care and Sleep Medicine      Interval History:     Michele does report that he does very little coughing but does a fair amount of throat clearing.  He does use his albuterol inhaler 2-3 times per day.  He does continue to be consistent with his Cayston use.  He does feel however that his Cayston does not work as well as it used to.  He used to be much more productive during those months.  He reports that he rarely is using his vest.  His activity tolerance remains excellent.          Review of Systems:     CONSTITUTIONAL: no fever, no chills, no change in weight, no change in energy, no change in appetite    INTEGUMENTARY/SKIN: no rash, no obvious new lesions    ENT/MOUTH: no sore throat, no new sinus pain, no new nasal drainage, no hearing loss, no ear ringing     RESPIRATORY: see interval history    CV: no chest pain, no palpitations, no peripheral edema, no orthopnea, no PND    GI: no nausea, no vomiting, no change in stools, no fatty stools, no change in GERD, no abdominal pain    : negative    MUSCULOSKELETAL: hamstring injury    NEURO:  No headache, no numbness, no tingling    SLEEP: no issues--not enough needs his coffee    PSYCHIATRIC: mood good          Past Medical and Surgical History:     Past Medical History:   Diagnosis Date     CAP (community acquired pneumonia)     2011     CF (cystic fibrosis) (H)     diagnosed 1969, malnutrition, staph empyema     Chronic knee pain      Diverticular disease 2018    see  on colonoscopy     GERD (gastroesophageal reflux disease)     egd in the past     Pancreatic insufficiency      Past Surgical History:   Procedure Laterality Date     ENDOSCOPIC SINUS SURGERY      removal of mucocele behind eye, 1992     OPTICAL TRACKING SYSTEM ENDOSCOPIC SINUS SURGERY Bilateral 11/28/2016    Procedure: OPTICAL TRACKING SYSTEM ENDOSCOPIC SINUS SURGERY;  Surgeon: Harriett Cosby MD;  Location:  OR           Family History:     Family History   Problem Relation Age of Onset     Lipids Father      Cardiovascular Father      Diabetes Maternal Grandmother         type 2            Social History:     Social History     Socioeconomic History     Marital status:      Spouse name: Not on file     Number of children: 1     Years of education: Not on file     Highest education level: Not on file   Occupational History     Occupation:    Social Needs     Financial resource strain: Not on file     Food insecurity:     Worry: Not on file     Inability: Not on file     Transportation needs:     Medical: Not on file     Non-medical: Not on file   Tobacco Use     Smoking status: Never Smoker     Smokeless tobacco: Never Used   Substance and Sexual Activity     Alcohol use: No     Alcohol/week: 0.0 oz     Drug use: No     Sexual activity: Yes     Partners: Female     Birth control/protection: None   Lifestyle     Physical activity:     Days per week: Not on file     Minutes per session: Not on file     Stress: Not on file   Relationships     Social connections:     Talks on phone: Not on file     Gets together: Not on file     Attends Yarsanism service: Not on file     Active member of club or organization: Not on file     Attends meetings of clubs or organizations: Not on file     Relationship status: Not on file     Intimate partner violence:     Fear of current or ex partner: Not on file     Emotionally abused: Not on file     Physically abused: Not on file     Forced sexual activity: Not on file  "  Other Topics Concern     Parent/sibling w/ CABG, MI or angioplasty before 65F 55M? Not Asked   Social History Narrative    Nov 2015--Lives in [place] with his wife and 12 yo son.  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            Medications:     Current Outpatient Medications   Medication     albuterol (PROAIR HFA, PROVENTIL HFA, VENTOLIN HFA) 108 (90 BASE) MCG/ACT inhaler     amylase-lipase-protease (ZENPEP) 99900-27274 units CPEP     azithromycin (ZITHROMAX) 500 MG tablet     Aztreonam Lysine (CAYSTON IN)     Cholecalciferol (VITAMIN D) 2000 UNITS CAPS     hydrochlorothiazide (MICROZIDE) 12.5 MG capsule     multivitamin CF formula (AQUADEKS) chewable tablet     pantoprazole (PROTONIX) 40 MG EC tablet     EDU ALTERA NEBULIZER SYSTEM MISC     ranitidine (ZANTAC) 150 MG tablet     Respiratory Therapy Supplies (EDU ALTERA NEBULIZER HANDSET) MISC     Respiratory Therapy Supplies (EDU ALTERA NEBULIZER HANDSET) MISC     sodium chloride inhalant 7 % NEBU neb solution     tezacaftor-ivacaftor & ivacaftor (SYMDEKO) 100-150 & 150 mg tablet pack     ZENPEP 09727 UNITS CPEP per EC capsule     No current facility-administered medications for this visit.             Physical Exam:   /77   Pulse 56   Resp 18   Ht 1.75 m (5' 8.9\")   Wt 73.5 kg (162 lb)   SpO2 95%   BMI 23.99 kg/m       Constitutional:   Awake, alert and in no apparent distress     Eyes:   nonicteric     ENT:   oral mucosa moist without lesions, normal tm bilaterally, bilateral mucosa normal     Neck:   Supple without supraclavicular or cervical lymphadenopathy     Lungs:   Good air flow.  No crackles. No rhonchi.  No wheezes.     Cardiovascular:   Normal S1 and S2.  RRR.  No murmur, gallop or rub.     Abdomen:   NABS, soft, nontender, nondistended.       Musculoskeletal:   No edema, digital clubbing present     Neurologic:   Alert and conversant.     Skin:   Warm, dry.  No rash on " limited exam.             Data:   All laboratory and imaging data reviewed.    Cystic Fibrosis Culture  Specimen Description   Date Value Ref Range Status   02/09/2018 Sputum  Final   12/22/2017 Sputum  Final   01/12/2016 Sputum  Final   01/12/2016 Sputum  Final   01/12/2016 Sputum  Final    Culture Micro   Date Value Ref Range Status   02/09/2018 Moderate growth  Normal tessa    Final   02/09/2018 (A)  Final    Moderate growth  Pseudomonas aeruginosa, mucoid strain     02/09/2018 Moderate growth  Pseudomonas aeruginosa   (A)  Final   02/09/2018 Light growth  Staphylococcus aureus   (A)  Final        Recent Results (from the past 168 hour(s))   General PFT Lab (Please always keep checked)    Collection Time: 04/17/19  9:06 AM   Result Value Ref Range    FVC-Pred 4.74 L    FVC-Pre 3.91 L    FVC-%Pred-Pre 82 %    FEV1-Pre 2.55 L    FEV1-%Pred-Pre 68 %    FEV1FVC-Pred 79 %    FEV1FVC-Pre 65 %    FEFMax-Pred 9.48 L/sec    FEFMax-Pre 7.79 L/sec    FEFMax-%Pred-Pre 82 %    FEF2575-Pred 3.37 L/sec    FEF2575-Pre 1.25 L/sec    OED7703-%Pred-Pre 37 %    ExpTime-Pre 11.20 sec    FIFMax-Pre 6.52 L/sec    FEV1FEV6-Pred 80 %    FEV1FEV6-Pre 69 %       PFT: Moderate obstructive lung disease.  When compared to 10/24/2018, the FEV1 and FVC have little change.  The decrease in FVC may represent air trapping v. restrictive physiology.  Lung volumes would be necessary to determine.    Pulmonary exacerbation: absent        Again, thank you for allowing me to participate in the care of your patient.      Sincerely,    Siobhan Munoz MD

## 2019-04-17 NOTE — PATIENT INSTRUCTIONS
Cystic Fibrosis Self-Care Plan    RECOMMENDATIONS:   Things look great today.  You need liver function studies in the next couple of weeks.  Will send orders to the [facility] for your blood draw.    YOUR GOAL:  Enjoy the spring!!      CF Nurse line:          Kaveh Marlow   [phone]            CF Resp Therapist: Alyse Cheek            [phone]   CF Dietitians:           Willa Romero            [phone]                       Alondra Vitale                       [phone]   CF Diabetes Nurse: Livia Mondragon             [phone]    CF Social Workers:  Kaykay Vieyra             [phone]                Ayala Andino            [phone]  CF Pharmacist:        Jael Arvizu                              [phone]  [email or web address]         MRN: [id number]   Clinic Date: April 17, 2019   Patient: Michele Altamirano     Annual Studies:   IGG   Date Value Ref Range Status   02/09/2018 883 695 - 1,620 mg/dL Final     Insulin   Date Value Ref Range Status   02/09/2018 36.2 (H) 3 - 25 mU/L Final     Comment:     Starting 7/13/2017, insulin results will decrease by approximately 37%   compared to insulin results reported in EPIC between (12/16/2016-7/12/2017),   and should be interpreted accordingly. Insulin results reported prior to   12/16/16 are comparable to current insulin results and therefore no adjustment   is needed.       There are no preventive care reminders to display for this patient.      Pulmonary Function Tests  FEV1: amount of air you can blow out in 1 second  FVC: total amount of air you can take in and blow out    Your Goals:         PFT Latest Ref Rng & Units 4/17/2019   FVC L 3.91   FEV1 L 2.55   FVC% % 82   FEV1% % 68          Airway Clearance: The Most Important Way to Keep Your Lungs Healthy  Vest Settings:    Hill-Rom Frequencies: 8, 9, 10 Pressure 10 Then, Frequencies 18, 19, 20 Pressure 6      RespirTech: Quick Start with Pressure of     Do each frequency  for 5 minutes; Deflate vest after each frequency & cough 3 times before beginning the next setting.    Vest and Neb Therapy should be done 2 times/day.    Good Nutrition Can Improve Lung Function and Overall Health     Take ALL of your vitamins with food     Take 1/2 of your enzymes before EVERY meal/snack and the other 1/2 mid-meal/snack    Wt Readings from Last 3 Encounters:   04/17/19 73.5 kg (162 lb)   10/24/18 71 kg (156 lb 8.4 oz)   02/09/18 72.3 kg (159 lb 4.8 oz)       Body mass index is 23.99 kg/m .         National CF Foundation Recommendations for BMI in CF Adults: Women: at least 22 Men: at least 23        Controlling Blood Sugars Helps Prevent Lung Infections & Improves Nutrition  Test blood sugar:     In the morning before eating (goal is )     2 hours after a meal (goal is less than 150)     When pre-meal glucose is greater than 150 add correction     At bedtime (if less than 100 eat a snack with 15 grams of carbohydrates  Last A1C Results:   Hemoglobin A1C   Date Value Ref Range Status   02/09/2018 6.3 (H) 4.3 - 6.0 % Final         If diabetic, measure A1C every 6 months. Goal: Under 7%    Staying Healthy    Research:  If you are interested in learning about research opportunities or have questions, please contact the CF Research Team at [phone] or [email or web address].      CF Foundation:  Compass is a personalized resource service to help you with the insurance, financial, legal and other issues you are facing.  It's free, confidential and available to anyone with CF.  Ask your  for more information or contact Compass directly at 331-KWAAYKP ([phone]) or compass@cff.org, or learn more at cff.org/compass.

## 2019-04-17 NOTE — NURSING NOTE
Chief Complaint   Patient presents with     Cystic Fibrosis     Follow up on Michele and his CF     Michele Lazcano CMA at 9:29 AM on 4/17/2019

## 2019-04-23 NOTE — PROGRESS NOTES
Respiratory Therapist Note:    Vest    Brand: Hill-Rom - traditional   Settings: NA   Cough Pause: No    Vest Garment Size: has one, not sure   Last Fitting Date: Due as needed   Frequency of therapy: 0 times per week.   Concerns: Patient reports vest therapy hasn't made him feel better in the past and he is too busy to do something that doesn't make him feel better. I did express concern that vest use can sometimes take several months for the patient to start noticing an improvement in how they feel daily when you haven't been using it at all.     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    Alternative Airway Clearance: AerobiKa- PRN      Nebulized Medications- Patient likes Cayston w/ Altera device   Bronchodilators: Albuterol- MDI   Mucolytic: 7% Hypertonic Saline (using only as needed)   Antibiotics: Cayston   Additional Inhaled Medications: MDI   Spacer Use: Yes    Review Cleaning: Yes. Soap and boil.    Education and Transition Information   Correct order of inhaled medications: Yes   Mechanism of Action of inhaled medications: Yes   Frequency of inhaled medications: Yes   Dosage of inhaled medications: Yes   Other: NA    Home Care:   Nebulizer Cups (Brand/Type): Norah- not currently using.   Nebulizer Compressor    Year Purchased: burrell- not currently using   Home Care Company:     Patient unsure, not currently using.        Plan of Care and Goals for next visit: Keep using Aerobika and active exercise. Patient interested in [facility], he was added to [facility] invite.

## 2019-07-11 DIAGNOSIS — E84.9 CYSTIC FIBROSIS (H): Primary | ICD-10-CM

## 2019-07-16 ENCOUNTER — TELEPHONE (OUTPATIENT)
Dept: PULMONOLOGY | Facility: CLINIC | Age: 54
End: 2019-07-16

## 2019-07-16 DIAGNOSIS — E84.9 CYSTIC FIBROSIS (H): ICD-10-CM

## 2019-07-24 DIAGNOSIS — E84.9 CF (CYSTIC FIBROSIS) (H): ICD-10-CM

## 2019-07-25 DIAGNOSIS — E84.9 CYSTIC FIBROSIS (H): ICD-10-CM

## 2019-08-16 DIAGNOSIS — E84.9 CF (CYSTIC FIBROSIS) (H): ICD-10-CM

## 2019-08-16 DIAGNOSIS — J47.9 BRONCHIECTASIS WITHOUT ACUTE EXACERBATION (H): ICD-10-CM

## 2019-08-16 RX ORDER — ALBUTEROL SULFATE 90 UG/1
2 AEROSOL, METERED RESPIRATORY (INHALATION) EVERY 4 HOURS PRN
Qty: 3 INHALER | Refills: 3 | Status: SHIPPED | OUTPATIENT
Start: 2019-08-16 | End: 2020-04-06

## 2019-08-23 DIAGNOSIS — E84.0 CYSTIC FIBROSIS WITH PULMONARY MANIFESTATIONS (H): ICD-10-CM

## 2019-08-23 DIAGNOSIS — K21.9 GASTROESOPHAGEAL REFLUX DISEASE WITHOUT ESOPHAGITIS: ICD-10-CM

## 2019-08-23 DIAGNOSIS — E55.9 VITAMIN D DEFICIENCY: ICD-10-CM

## 2019-08-23 DIAGNOSIS — K86.89 PANCREATIC INSUFFICIENCY: ICD-10-CM

## 2019-08-23 RX ORDER — PANTOPRAZOLE SODIUM 40 MG/1
TABLET, DELAYED RELEASE ORAL
Qty: 180 TABLET | Refills: 3 | Status: SHIPPED | OUTPATIENT
Start: 2019-08-23 | End: 2020-09-08

## 2019-11-09 ENCOUNTER — HEALTH MAINTENANCE LETTER (OUTPATIENT)
Age: 54
End: 2019-11-09

## 2019-11-25 ENCOUNTER — TELEPHONE (OUTPATIENT)
Dept: PULMONOLOGY | Facility: CLINIC | Age: 54
End: 2019-11-25

## 2019-11-25 NOTE — TELEPHONE ENCOUNTER
PA Initiation    Medication: Cayston- Renewal pending  Insurance Company: [employer] - Phone [phone] Fax [phone]  Pharmacy Filling the Rx: [facility] - [place], [place] - [address] [place] [address]  Filling Pharmacy Phone:    Filling Pharmacy Fax:    Start Date: 11/25/2019

## 2019-11-26 NOTE — TELEPHONE ENCOUNTER
Prior Authorization Approval    Authorization Effective Date: 11/26/2019  Authorization Expiration Date: 11/26/2021  Medication: Cayston- [facility] APPROVED  Approved Dose/Quantity: 84 PER 28 DAYS  Reference #:     Insurance Company: [employer] - Phone [phone] Fax [phone]  Expected CoPay:       CoPay Card Available:      Foundation Assistance Needed:    Which Pharmacy is filling the prescription (Not needed for infusion/clinic administered): [place], [place] - [address] [place] [address]  Pharmacy Notified: No  Patient Notified: No

## 2019-12-09 DIAGNOSIS — E84.0 CYSTIC FIBROSIS WITH PULMONARY MANIFESTATIONS (H): ICD-10-CM

## 2019-12-17 DIAGNOSIS — E84.9 CYSTIC FIBROSIS (H): ICD-10-CM

## 2019-12-18 DIAGNOSIS — E84.9 CYSTIC FIBROSIS (H): ICD-10-CM

## 2019-12-23 DIAGNOSIS — E84.9 CYSTIC FIBROSIS (H): ICD-10-CM

## 2020-01-24 DIAGNOSIS — E84.9 CF (CYSTIC FIBROSIS) (H): ICD-10-CM

## 2020-01-24 RX ORDER — TEZACAFTOR AND IVACAFTOR 100-150 MG
KIT ORAL
Qty: 56 TABLET | Refills: 5 | Status: SHIPPED | OUTPATIENT
Start: 2020-01-24 | End: 2020-07-06

## 2020-02-05 DIAGNOSIS — E84.9 CF (CYSTIC FIBROSIS) (H): ICD-10-CM

## 2020-02-06 RX ORDER — NEBULIZER
75 EACH MISCELLANEOUS 3 TIMES DAILY
Qty: 1 EACH | Refills: 1 | Status: SHIPPED | OUTPATIENT
Start: 2020-02-06 | End: 2020-03-10

## 2020-03-04 DIAGNOSIS — K21.9 GASTROESOPHAGEAL REFLUX DISEASE WITHOUT ESOPHAGITIS: ICD-10-CM

## 2020-03-04 DIAGNOSIS — E55.9 VITAMIN D DEFICIENCY: ICD-10-CM

## 2020-03-04 DIAGNOSIS — E84.0 CYSTIC FIBROSIS WITH PULMONARY MANIFESTATIONS (H): ICD-10-CM

## 2020-03-04 DIAGNOSIS — K86.89 PANCREATIC INSUFFICIENCY: ICD-10-CM

## 2020-03-05 RX ORDER — AZITHROMYCIN 500 MG/1
500 TABLET, FILM COATED ORAL
Qty: 26 TABLET | Refills: 0 | Status: SHIPPED | OUTPATIENT
Start: 2020-03-06 | End: 2020-04-06

## 2020-03-08 DIAGNOSIS — E55.9 VITAMIN D DEFICIENCY: ICD-10-CM

## 2020-03-08 DIAGNOSIS — K21.9 GASTROESOPHAGEAL REFLUX DISEASE WITHOUT ESOPHAGITIS: ICD-10-CM

## 2020-03-08 DIAGNOSIS — Z13.1 SCREENING FOR DIABETES MELLITUS (DM): ICD-10-CM

## 2020-03-08 DIAGNOSIS — Z13.220 LIPID SCREENING: ICD-10-CM

## 2020-03-08 DIAGNOSIS — E84.0 CYSTIC FIBROSIS WITH PULMONARY MANIFESTATIONS (H): ICD-10-CM

## 2020-03-08 DIAGNOSIS — K86.89 PANCREATIC INSUFFICIENCY: ICD-10-CM

## 2020-03-08 DIAGNOSIS — R73.09 ABNORMAL GLUCOSE TOLERANCE TEST: ICD-10-CM

## 2020-03-08 DIAGNOSIS — E84.9 CF (CYSTIC FIBROSIS) (H): ICD-10-CM

## 2020-03-08 DIAGNOSIS — Z13.220 SCREENING FOR HYPERLIPIDEMIA: ICD-10-CM

## 2020-03-09 RX ORDER — SODIUM CHLORIDE FOR INHALATION 7 %
4 VIAL, NEBULIZER (ML) INHALATION DAILY
Qty: 720 ML | Refills: 3 | Status: SHIPPED | OUTPATIENT
Start: 2020-03-09 | End: 2022-03-21

## 2020-03-10 DIAGNOSIS — E84.9 CF (CYSTIC FIBROSIS) (H): ICD-10-CM

## 2020-03-10 RX ORDER — AZTREONAM 75 MG/ML
75 KIT INHALATION 3 TIMES DAILY
Qty: 84 ML | Refills: 3 | Status: SHIPPED | OUTPATIENT
Start: 2020-03-10 | End: 2020-03-16

## 2020-03-10 RX ORDER — NEBULIZER
75 EACH MISCELLANEOUS 3 TIMES DAILY
Qty: 1 EACH | Refills: 11 | Status: SHIPPED | OUTPATIENT
Start: 2020-03-10 | End: 2020-03-16

## 2020-03-10 NOTE — PROGRESS NOTES
Outpatient Physical Therapy Discharge Note     Patient: Michele Altamirano  : 1965    Beginning/End Dates of Reporting Period:  19    Referring Provider: wilfred    Therapy Diagnosis: L hip/pelvis weakness with knee pain and limited exercis etolerance for airway clearance      Client Self Report: please see eval    Discharge:    Reason for Discharge: Patient has failed to schedule further appointments.    Equipment Issued: no    Discharge Plan: Patient to continue home program.

## 2020-03-10 NOTE — ADDENDUM NOTE
Encounter addended by: Regina Jose, PT on: 3/10/2020 5:41 PM   Actions taken: Clinical Note Signed, Flowsheet accepted, Episode resolved

## 2020-03-14 DIAGNOSIS — J47.9 BRONCHIECTASIS WITHOUT ACUTE EXACERBATION (H): ICD-10-CM

## 2020-03-16 DIAGNOSIS — E84.9 CF (CYSTIC FIBROSIS) (H): ICD-10-CM

## 2020-03-16 RX ORDER — CIPROFLOXACIN 750 MG/1
TABLET, FILM COATED ORAL
Qty: 42 TABLET | Refills: 0 | OUTPATIENT
Start: 2020-03-16

## 2020-03-16 RX ORDER — AZTREONAM 75 MG/ML
75 KIT INHALATION 3 TIMES DAILY
Qty: 84 ML | Refills: 3 | Status: SHIPPED | OUTPATIENT
Start: 2020-03-16 | End: 2021-07-08

## 2020-03-16 RX ORDER — NEBULIZER
75 EACH MISCELLANEOUS 3 TIMES DAILY
Qty: 1 EACH | Refills: 11 | Status: SHIPPED | OUTPATIENT
Start: 2020-03-16 | End: 2021-07-08

## 2020-03-17 DIAGNOSIS — J47.9 BRONCHIECTASIS WITHOUT ACUTE EXACERBATION (H): ICD-10-CM

## 2020-03-17 RX ORDER — CIPROFLOXACIN 750 MG/1
TABLET, FILM COATED ORAL
Qty: 42 TABLET | Refills: 0 | OUTPATIENT
Start: 2020-03-17

## 2020-03-18 ENCOUNTER — TELEPHONE (OUTPATIENT)
Dept: PULMONOLOGY | Facility: CLINIC | Age: 55
End: 2020-03-18

## 2020-03-18 DIAGNOSIS — E84.0 CYSTIC FIBROSIS WITH PULMONARY MANIFESTATIONS (H): Primary | ICD-10-CM

## 2020-03-18 RX ORDER — CIPROFLOXACIN 750 MG/1
750 TABLET, FILM COATED ORAL 2 TIMES DAILY
Qty: 28 TABLET | Refills: 0 | Status: SHIPPED | OUTPATIENT
Start: 2020-03-18 | End: 2020-04-08

## 2020-03-18 NOTE — TELEPHONE ENCOUNTER
[facility]  March 18, 2020    Dorene Mireles    Cystic fibrosis Provider: Siobhan Munoz MD    Caller: Patient     Clinical information:  Michele Altamirano called with complaint of chest congestion for the past two weeks. Tried restarting the vest, but finds it ineffective for him. Using the aerobika twice daily. Feels chest is tighter. Sputum production is yellow in color. [facility]    Plan:   Discussed with Dr Munoz:  Cipro 750 mg BID x2 weeks.  Call back with any new or worsening symptoms/concerns.    Caller verbalized understanding of plan and agrees with advice given.

## 2020-03-18 NOTE — PROGRESS NOTES
Ele spoke with Dr. Munoz. VO for Cipro 750 mg BID x 2 weeks sent to patient's listed preferred pharmacy. Ele will follow up with patient to inform.    Savita Fox RN

## 2020-04-02 ENCOUNTER — CLINICAL UPDATE (OUTPATIENT)
Dept: PHARMACY | Facility: CLINIC | Age: 55
End: 2020-04-02
Payer: COMMERCIAL

## 2020-04-02 DIAGNOSIS — E84.9 CYSTIC FIBROSIS (H): Primary | ICD-10-CM

## 2020-04-02 PROCEDURE — 99207 ZZC NO CHARGE LOS: CPT | Performed by: PHARMACIST

## 2020-04-02 NOTE — PROGRESS NOTES
Clinical Update:                                                      At the request of patient's provider, a pre-visit chart review was completed.    CFTR:   Patient is eligible for treatment with Trikafta (elexacaftor/tezacaftor/ivacaftor) based on having one copy of [id number] mutation in their CFTR gene and age greater than 12 years.   Patient s genotype is [id number]  Patient is currently on Symdeko  Side effects of current CFTR modulator include: no side effects  LFTs have not been checked in one year  Drug-drug interactions with Trikafta (elexacaftor/tezacaftor/ivacaftor): no significant drug-drug interactions identified  Dose adjustment needed for Trikafta (elexacaftor/tezacaftor/ivacaftor): none  Dose adjustment needed for concomitant medications: none    Recommendation:  Start Trikafta 2 orange tablets in the morning and 1 blue tablet in the evening, 12 hours apart with fat-containing food.    Monitor hepatic panel and CK at baseline, every 3 months for 1 year, then annually therafter.    Jael Arvizu PharmD  CF Medication Therapy Management Pharmacist  [facility]  [phone]

## 2020-04-06 ENCOUNTER — TELEPHONE (OUTPATIENT)
Dept: PULMONOLOGY | Facility: CLINIC | Age: 55
End: 2020-04-06

## 2020-04-06 DIAGNOSIS — J47.9 BRONCHIECTASIS WITHOUT ACUTE EXACERBATION (H): ICD-10-CM

## 2020-04-06 DIAGNOSIS — E84.0 CYSTIC FIBROSIS WITH PULMONARY MANIFESTATIONS (H): ICD-10-CM

## 2020-04-06 DIAGNOSIS — E55.9 VITAMIN D DEFICIENCY: ICD-10-CM

## 2020-04-06 DIAGNOSIS — E84.9 CF (CYSTIC FIBROSIS) (H): ICD-10-CM

## 2020-04-06 DIAGNOSIS — K21.9 GASTROESOPHAGEAL REFLUX DISEASE WITHOUT ESOPHAGITIS: ICD-10-CM

## 2020-04-06 DIAGNOSIS — K86.89 PANCREATIC INSUFFICIENCY: ICD-10-CM

## 2020-04-06 RX ORDER — ALBUTEROL SULFATE 90 UG/1
2 AEROSOL, METERED RESPIRATORY (INHALATION) EVERY 4 HOURS PRN
Qty: 3 INHALER | Refills: 3 | Status: SHIPPED | OUTPATIENT
Start: 2020-04-06 | End: 2020-10-12

## 2020-04-06 RX ORDER — AZITHROMYCIN 500 MG/1
500 TABLET, FILM COATED ORAL
Qty: 39 TABLET | Refills: 3 | Status: SHIPPED | OUTPATIENT
Start: 2020-04-06 | End: 2020-12-07

## 2020-04-06 NOTE — TELEPHONE ENCOUNTER
Called patient to screen for symptoms and change upcoming appointment to virtual visit. Patients states they have not experienced any recent fevers. Patient is agreeable to participate in virtual visit on Wednesday 4/8 at 8:30a. Verified correct email address listed in Epic. Instructed patient to have ipad/laptop available and open to email at time of visit. Expecting phone call from [facility] prior to appointment time. Verbalized understanding. Patient stated he is out of albuterol and running low on Azithro, recent azithro renewal script not filled as his insurance only covers 90 supplies. Patient requested both prescriptions be sent to [facility] mail order. This writer sent renewed scripts from 90 day supplies of both Albuterol and Azithro.  Gabrielle Natarajan RN

## 2020-04-08 ENCOUNTER — MYC MEDICAL ADVICE (OUTPATIENT)
Dept: PULMONOLOGY | Facility: CLINIC | Age: 55
End: 2020-04-08

## 2020-04-08 ENCOUNTER — VIRTUAL VISIT (OUTPATIENT)
Dept: PULMONOLOGY | Facility: CLINIC | Age: 55
End: 2020-04-08
Attending: INTERNAL MEDICINE
Payer: COMMERCIAL

## 2020-04-08 DIAGNOSIS — E84.9 CYSTIC FIBROSIS (H): Primary | ICD-10-CM

## 2020-04-08 DIAGNOSIS — E84.9 CF (CYSTIC FIBROSIS) (H): Primary | ICD-10-CM

## 2020-04-08 NOTE — PATIENT INSTRUCTIONS
Cystic Fibrosis Self-Care Plan    RECOMMENDATIONS:   Michele, it was great to see you today.  I am glad that you are mostly at home with your family.   You and I discussed the following today:  I will have Brooke Vitale our dietitian contact you to discuss your blood sugars and any dietary questions you may have.  I will have our pharmacist contact you regarding the initiation of trikafta.  This will include some education and work on the prior Auth.    We discussed changing your aztreonam to 2 weeks on 2 weeks off.  I would continue to do your nebulized therapy twice daily.  Please do your vest and if you were to have pulmonary symptomatology.    YOUR GOAL: Take care and stay well.      [facility] Nurse line:  Kaveh Marlow    [phone]     [facility] Fax Number:      [phone]         Cystic fibrosis Respiratory Therapist:   Alyse hCeek              [phone]   Cystic fibrosis Dietitians:              Willa Romero              [phone]                            Alondra Vitale                        [phone]   Cystic fibrosis Diabetes Nurse:    Livia Mondragon   [phone]    Cystic fibrosis Social Workers:     Kaykay Vieyra               [phone]                     Rayna Díaz               [phone]  Cystic fibrosis Pharmacist:           Jael Arvizu                               [phone]         Ameena Guerrero   [phone]  Cystic Fibrosis Genetic Counselor:   Charisse Reveles    [phone]    [facility] website:  [email or web address]         MRN: [id number]   Clinic Date: April 8, 2020   Patient: Michele Altamirano     Annual Studies:   IGG   Date Value Ref Range Status   02/09/2018 883 695 - 1,620 mg/dL Final     Insulin   Date Value Ref Range Status   02/09/2018 36.2 (H) 3 - 25 mU/L Final     Comment:     Starting 7/13/2017, insulin results will decrease by approximately 37%   compared to insulin results reported  in EPIC between (12/16/2016-7/12/2017),   and should be interpreted accordingly. Insulin results reported prior to   12/16/16 are comparable to current insulin results and therefore no adjustment   is needed.       There are no preventive care reminders to display for this patient.    Pulmonary Function Tests  FEV1: amount of air you can blow out in 1 second  FVC: total amount of air you can take in and blow out    Your Goals:         PFT Latest Ref Rng & Units 4/17/2019   FVC L 3.91   FEV1 L 2.55   FVC% % 82   FEV1% % 68          Airway Clearance: The Most Important Way to Keep Your Lungs Healthy  Vest Settings:    Hill-Rom Frequencies: 8, 9, 10 Pressure 10 Then, Frequencies 18, 19, 20 Pressure 6      RespirTech: Quick Start with Pressure of     Do each frequency for 5 minutes; Deflate vest after each frequency & cough 3 times before beginning the next setting.    Vest and Neb Therapy should be done 2 times/day.    Good Nutrition Can Improve Lung Function and Overall Health     Take ALL of your vitamins with food     Take 1/2 of your enzymes before EVERY meal/snack and the other 1/2 mid-meal/snack    Wt Readings from Last 3 Encounters:   04/17/19 73.5 kg (162 lb)   10/24/18 71 kg (156 lb 8.4 oz)   02/09/18 72.3 kg (159 lb 4.8 oz)       There is no height or weight on file to calculate BMI.         National CF Foundation Recommendations for BMI in CF Adults: Women: at least 22 Men: at least 23        Controlling Blood Sugars Helps Prevent Lung Infections & Improves Nutrition  Test blood sugar:     In the morning before eating (goal is )     2 hours after a meal (goal is less than 150)     When pre-meal glucose is greater than 150 add correction     At bedtime (if less than 100 eat a snack with 15 grams of carbohydrates  Last A1C Results:   Hemoglobin A1C   Date Value Ref Range Status   02/09/2018 6.3 (H) 4.3 - 6.0 % Final         If diabetic, measure A1C every 6 months. Goal: Under 7%    Staying  Healthy    Research:  If you are interested in learning about research opportunities or have questions, please contact the CF Research Team at [phone] or [email or web address].      CF Foundation:  Compass is a personalized resource service to help you with the insurance, financial, legal and other issues you are facing.  It's free, confidential and available to anyone with CF.  Ask your  for more information or contact Compass directly at 152-DQLVPPK ([phone]) or compass@cff.org, or learn more at cff.[email or web address].

## 2020-04-08 NOTE — PROGRESS NOTES
"Michele Altamirano is a 54 year old male who is being evaluated via a billable video visit.      The patient has been notified of following:     \"This video visit will be conducted via a call between you and your physician/provider. We have found that certain health care needs can be provided without the need for an in-person physical exam.  This service lets us provide the care you need with a video conversation.  If a prescription is necessary we can send it directly to your pharmacy.  If lab work is needed we can place an order for that and you can then stop by our lab to have the test done at a later time.    Video visits are billed at different rates depending on your insurance coverage.  Please reach out to your insurance provider with any questions.    If during the course of the call the physician/provider feels a video visit is not appropriate, you will not be charged for this service.\"    Patient has given verbal consent for Video visit? Yes    Patient would like the video invitation sent by: Send to e-mail at: [email or web address]    Video Start Time: 8:38    Michele Altamirano complains of    Chief Complaint   Patient presents with     Cystic Fibrosis       [facility]  April 8, 2020         Assessment and Plan:   Michele Altamirano is a 54 year old male with cystic fibrosis.    1. 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  He reports during the global pandemic that he has been able to stay safe and limit contact time with others.  His family members are also at home as well.  He has been following his pulmonary function at home and says that it is stable.  He does do aerosols but does not inconsistently do vest therapy.  He historically has grown out Pseudomonas in his sputum.  At this time I " recommended:  --He continue to be consistent with his nebulized therapy  --I would certainly recommend that he initiate vest therapy for any worsening pulmonary symptomatology  --We discussed changing his aztreonam nebulized therapy to every 2 weeks to prevent his changing symptoms and give him a little bit more disease stability    2. Pancreatic Insufficiency:  The patient has no new symptoms consistent with worsening malabsorption.    - continue the present dose of pancreatic enzymes  - continue vitamin supplementation.    3.  CFTR modulator therapy Michele is done well on Symdeko therapy.  He is a candidate for trikafta.  I will review his most recent labs including hepatic panel and CK.  These now may need to be repeated.  I will work with my team to initiate trikafta with Michele.    4. Impaired glucose tolerance: Michele reports that his blood sugars run anywhere between 85 and 159.  I will asked Alondra Vitale our dietitian to review these in more details with him.  I do not have access to his full data today during our conversation.    5.  Psychosocial: Michele again is at is  and has a son.  He reports that everybody is doing well.  Work remains busy.  He does try to exercise and run fairly consistently.      Siobhan Munoz MD MPH  Associate Professor of Medicine  Pulmonary, Allergy, Critical Care and Sleep Medicine      Interval History:     Michele does report that he continues to produce sputum that is aerosols but does not consistently do vest therapy.  Green in color.  His cough frequency and sputum volume are stable.  His activity tolerance does remain quite good.  He does feel that [place] is beneficial to him.  He does form follow his home spirometry.         Review of Systems:     CONSTITUTIONAL: no fever, no chills, no sweats, increase in weight, no change in energy, no change in appetite    INTEGUMENTARY/SKIN: no rash, no obvious new lesions    ENT/MOUTH: no sore throat, no new sinus pain, no new  nasal drainage, no ear ringing     RESPIRATORY: see interval history    CV: no chest pain, no palpitations, no peripheral edema    GI: no nausea, no vomiting, no change in stools, no fatty stools, no GERD, no abdominal pain    : no dysuria, no urinary frequency    MUSCULOSKELETAL: neck tendon sliding    ENDOCRINE: no excessive thirst, blood sugars with adequate control    NEURO:  No headache, no numbness, no tingling    SLEEP: always a struggle    PSYCHIATRIC: mood great          Past Medical and Surgical History:     Past Medical History:   Diagnosis Date     CAP (community acquired pneumonia)     2011     CF (cystic fibrosis) (H)     diagnosed 1969, malnutrition, staph empyema     Chronic knee pain      Diverticular disease 2018    see on colonoscopy     GERD (gastroesophageal reflux disease)     egd in the past     Pancreatic insufficiency      Past Surgical History:   Procedure Laterality Date     ENDOSCOPIC SINUS SURGERY      removal of mucocele behind eye, 1992     OPTICAL TRACKING SYSTEM ENDOSCOPIC SINUS SURGERY Bilateral 11/28/2016    Procedure: OPTICAL TRACKING SYSTEM ENDOSCOPIC SINUS SURGERY;  Surgeon: Harriett Cosby MD;  Location: [facility] OR           Family History:     Family History   Problem Relation Age of Onset     Lipids Father      Cardiovascular Father      Diabetes Maternal Grandmother         type 2            Social History:     Social History     Socioeconomic History     Marital status:      Spouse name: Not on file     Number of children: 1     Years of education: Not on file     Highest education level: Not on file   Occupational History     Occupation:    Social Needs     Financial resource strain: Not on file     Food insecurity     Worry: Not on file     Inability: Not on file     Transportation needs     Medical: Not on file     Non-medical: Not on file   Tobacco Use     Smoking status: Never Smoker     Smokeless tobacco: Never Used   Substance and Sexual Activity      Alcohol use: No     Alcohol/week: 0.0 standard drinks     Drug use: No     Sexual activity: Yes     Partners: Female     Birth control/protection: None   Lifestyle     Physical activity     Days per week: Not on file     Minutes per session: Not on file     Stress: Not on file   Relationships     Social connections     Talks on phone: Not on file     Gets together: Not on file     Attends Hindu service: Not on file     Active member of club or organization: Not on file     Attends meetings of clubs or organizations: Not on file     Relationship status: Not on file     Intimate partner violence     Fear of current or ex partner: Not on file     Emotionally abused: Not on file     Physically abused: Not on file     Forced sexual activity: Not on file   Other Topics Concern     Parent/sibling w/ CABG, MI or angioplasty before 65F 55M? Not Asked   Social History Narrative    Nov 2015--Lives in [place] with his wife and 12 yo son.  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            Medications:     Current Outpatient Medications   Medication     albuterol (PROAIR HFA/PROVENTIL HFA/VENTOLIN HFA) 108 (90 Base) MCG/ACT inhaler     amylase-lipase-protease (ZENPEP) 76750-99404 units CPEP     azithromycin (ZITHROMAX) 500 MG tablet     aztreonam lysine (CAYSTON) 75 MG SOLR     Cholecalciferol (VITAMIN D) 2000 UNITS CAPS     hydrochlorothiazide (MICROZIDE) 12.5 MG capsule     multivitamin CF formula (AQUADEKS) chewable tablet     pantoprazole (PROTONIX) 40 MG EC tablet     EDU ALTERA NEBULIZER SYSTEM [facility]     Respiratory Therapy Supplies (EDU ALTERA NEBULIZER HANDSET) [facility]     Respiratory Therapy Supplies (EDU ALTERA NEBULIZER HANDSET) MISC     sodium chloride inhalant 7 % NEBU neb solution     SYMDEKO 100-150 & 150 MG tablet pack     No current facility-administered medications for this visit.             Physical Exam:   There were no vitals taken for this  visit.    Not performed         Data:   All laboratory and imaging data reviewed.    Cystic Fibrosis Culture  Specimen Description   Date Value Ref Range Status   02/09/2018 Sputum  Final   12/22/2017 Sputum  Final   01/12/2016 Sputum  Final   01/12/2016 Sputum  Final   01/12/2016 Sputum  Final    Culture Micro   Date Value Ref Range Status   02/09/2018 Moderate growth  Normal tessa    Final   02/09/2018 (A)  Final    Moderate growth  Pseudomonas aeruginosa, mucoid strain     02/09/2018 Moderate growth  Pseudomonas aeruginosa   (A)  Final   02/09/2018 Light growth  Staphylococcus aureus   (A)  Final        No results found for this or any previous visit (from the past 168 hour(s)).      PFT: Not performed    Pulmonary exacerbation: absent    Video-Visit Details    Type of service:  Video Visit    Video End Time (time video stopped): 9:13    Originating Location (pt. Location): Home    Distant Location (provider location):  [facility]     Mode of Communication:  Video Conference via [employer]

## 2020-04-09 ENCOUNTER — TELEPHONE (OUTPATIENT)
Dept: NUTRITION | Facility: CLINIC | Age: 55
End: 2020-04-09

## 2020-04-09 NOTE — PROGRESS NOTES
"Nutrition Services Encounter:  Message from provider received regarding patient's BGs/data. Of note, last OGTT completed 2/2018 with 2hr glucose >200 mg/dL. Does not appear to be following with diabetes team at this time.     Phone call made to patient who states that he has \"been collecting some data (I.e BGs)\" that he plans to bring to his next clinic visit. He does not have any specific questions regarding BGs or CFRD at time of this phone call. Patient did not wish to speak more in depth to this writer regarding this. Offered formal virtual or telephone visit but patient not interested at this time.     Discussed that RD will need to speak with patient at next in person clinic visit (likely in the fall per patient.) Or, if has questions or nutritional concerns in the meantime, recommended he contact CF center to schedule virtual visit with RD team.     Michele did not have further questions or concerns. Will attempt to meet with patient this fall or sooner as medically warranted pending pt wishes.     Would recommend obtaining updated OGTT at next CF clinic visit to further assess glucose trends and CFRD; likely will need endocrine consult pending results.     Saray Glasgow RD, HELEN, [facility]  Cystic Fibrosis & Pulmonary Dietitian  [facility]  Pager #[phone]  Phone #[phone]  "

## 2020-05-20 ENCOUNTER — CLINICAL UPDATE (OUTPATIENT)
Dept: PHARMACY | Facility: CLINIC | Age: 55
End: 2020-05-20
Payer: COMMERCIAL

## 2020-05-20 DIAGNOSIS — E84.9 CYSTIC FIBROSIS (H): Primary | ICD-10-CM

## 2020-05-20 PROCEDURE — 99207 ZZC NO CHARGE LOS: CPT | Performed by: PHARMACIST

## 2020-05-20 NOTE — PROGRESS NOTES
Clinical Update:                                                      Discussion: At Michele's 4/8/20 appointment with Dr. Munoz, the plan was to initiate Trikafta (Michele has been on Symdeko).  Patient was instructed to have labs (hepatic panel and CK) drawn.  CF nurse care coordinator contacted patient via [employer] and voice mail to let him know the labs had been ordered and he could go to a [place] facility to have them drawn, or he could use a non-[place] lab if he preferred. I sent him another [employer] message on 4/22/20 about labs but he has not responded or had labs drawn.  Michele refilled his Symdeko on 4/17/20 and 5/15/20, so it appears that he intends to continue taking Symdeko for now.    Plan:  1. Will notify Dr. Munoz that Michele did not have labs drawn and is continuing Symdeko for now.  2. Will re-address at next CF clinic appointment.    Jael Arvizu PharmD  CF Medication Therapy Management Pharmacist  [facility]  [phone]

## 2020-07-03 DIAGNOSIS — E84.9 CF (CYSTIC FIBROSIS) (H): ICD-10-CM

## 2020-07-06 ENCOUNTER — TELEPHONE (OUTPATIENT)
Dept: PULMONOLOGY | Facility: CLINIC | Age: 55
End: 2020-07-06

## 2020-07-06 DIAGNOSIS — E84.9 CF (CYSTIC FIBROSIS) (H): Primary | ICD-10-CM

## 2020-07-06 DIAGNOSIS — Z20.822 ENCOUNTER FOR LABORATORY TESTING FOR COVID-19 VIRUS: ICD-10-CM

## 2020-07-06 RX ORDER — TEZACAFTOR AND IVACAFTOR 100-150 MG
KIT ORAL
Qty: 56 TABLET | Refills: 5 | Status: SHIPPED | OUTPATIENT
Start: 2020-07-06 | End: 2020-07-16 | Stop reason: ALTCHOICE

## 2020-07-06 NOTE — TELEPHONE ENCOUNTER
Patient called and reports fatigue and temp of 101. No other symptoms. He has been out in the public and working but states he wears a mask and social distance.     Plan:   1. covid testing either with his PCP or here (whichever one has a faster turn around time).   2. Call back if symptoms worsen

## 2020-07-14 ENCOUNTER — TRANSFERRED RECORDS (OUTPATIENT)
Dept: HEALTH INFORMATION MANAGEMENT | Facility: CLINIC | Age: 55
End: 2020-07-14

## 2020-07-14 LAB
ALBUMIN SERPL-MCNC: 4.4 G/DL
ALP SERPL-CCNC: 102 U/L
ALT SERPL-CCNC: 46 U/L
ANION GAP SERPL CALCULATED.3IONS-SCNC: NORMAL MMOL/L
AST SERPL-CCNC: 40 U/L
BILIRUB SERPL-MCNC: 0.5 MG/DL
BUN SERPL-MCNC: NORMAL MG/DL
CALCIUM SERPL-MCNC: NORMAL MG/DL
CHLORIDE SERPLBLD-SCNC: NORMAL MMOL/L
CK TOTAL: 97 U/L
CO2 SERPL-SCNC: NORMAL MMOL/L
CREAT SERPL-MCNC: NORMAL MG/DL
GFR SERPL CREATININE-BSD FRML MDRD: NORMAL ML/MIN/{1.73_M2}
GLUCOSE SERPL-MCNC: NORMAL MG/DL (ref 70–99)
POTASSIUM SERPL-SCNC: NORMAL MMOL/L
PROT SERPL-MCNC: 6.8 G/DL
SODIUM SERPL-SCNC: NORMAL MMOL/L

## 2020-07-15 ENCOUNTER — TELEPHONE (OUTPATIENT)
Dept: PHARMACY | Facility: CLINIC | Age: 55
End: 2020-07-15

## 2020-07-15 ENCOUNTER — EXTERNAL ORDER RESULTS (OUTPATIENT)
Dept: PULMONOLOGY | Facility: CLINIC | Age: 55
End: 2020-07-15

## 2020-07-15 NOTE — TELEPHONE ENCOUNTER
LM for patient to return my call.  His baseline Trikafta labs were WNL and he can start taking this medication when his is ready.    Jael Arvizu PharmD  CF Medication Therapy Management Pharmacist  [facility]  [phone]

## 2020-07-16 ENCOUNTER — ALLIED HEALTH/NURSE VISIT (OUTPATIENT)
Dept: PHARMACY | Facility: CLINIC | Age: 55
End: 2020-07-16
Payer: COMMERCIAL

## 2020-07-16 ENCOUNTER — TELEPHONE (OUTPATIENT)
Dept: PULMONOLOGY | Facility: CLINIC | Age: 55
End: 2020-07-16

## 2020-07-16 DIAGNOSIS — E84.9 CF (CYSTIC FIBROSIS) (H): Primary | ICD-10-CM

## 2020-07-16 DIAGNOSIS — E84.9 CYSTIC FIBROSIS (H): Primary | ICD-10-CM

## 2020-07-16 PROCEDURE — 99207 ZZC NO CHARGE LOS: CPT | Performed by: PHARMACIST

## 2020-07-16 RX ORDER — ELEXACAFTOR, TEZACAFTOR, AND IVACAFTOR 100-50-75
KIT ORAL
Qty: 84 TABLET | Refills: 3 | Status: SHIPPED | OUTPATIENT
Start: 2020-07-16 | End: 2020-10-28

## 2020-07-16 NOTE — PROGRESS NOTES
Clinical Pharmacy Consult:                                                    Michele Altamirano is a 54 year old male called for a clinical pharmacist consult.  He was referred to me from Dr. Munoz.     Reason for Consult: Trikafta initiation    Discussion:    Patient is eligible for treatment with CFTR modulator therapy. Most appropriate choice for patient of currently available CFTR modulators is: elexacaftor/tezacaftor/ivacaftor based on age, CFTR mutation genotype, past medical history and current medications. Patient is currently on  tezacaftor/ivacaftor.    Recommended dose two orange elexacaftor 100mg/tezacaftor 50mg/ivacaftor 75mg in the morning and one blue ivacaftor 150mg tablet in the evening    Drug interactions with CFTR modulator: none    Dose should be given with age-appropriate fat containing food. Provided appropriate examples of fat-containing foods to patient (e.g. Whole milk, cheese, avocado, peanut butter).    Patient will not need dilated eye exam prior to initiation.    Baseline LFTs and CK drawn and are within normal limits Recommend continuing to monitor LFTs and CK quarterly for the first year of treatment then annually therafter.  Additional recommended monitoring: none.    Educated patient on potential side effects, including headache, GI disturbances, rash, increased mucus production/respiratory symptoms.  Education provided on how to administer medication, what to do if a dose is missed, monitoring prior to and while on therapy, medications/foods to avoid, and how to transition from Symdeko.  Written patient information from [email or web address] was declined by patient.  Discussed with patient how to obtain CFTR modulator from specialty pharmacy and informed patient they will need to bring home supply if hospitalized. Patient was engaged in teaching and verbalized understanding.    Patient is already enrolled in [email or web address] .       Plan:  1. We will work on obtaining insurance authorization for Trikafta.  Once PA is approved we will send the prescription to [facility] and they will contact you to arrange a delivery.  2. When you receive the medication start Trikafta 2 orange tablets in the morning and 1 blue tablet in the evening, 12 hours apart with fat-containing food.    Jael Arvizu PharmD  CF Medication Therapy Management Pharmacist  [facility]  [phone]

## 2020-07-16 NOTE — TELEPHONE ENCOUNTER
PA Initiation    Medication: Trikafta-PENDING  Insurance Company: [employer] - Phone [phone] Fax [phone]  Pharmacy Filling the Rx: [facility] - [place], [place] - [facility] [address]  Filling Pharmacy Phone:    Filling Pharmacy Fax:    Start Date: 7/16/2020

## 2020-07-17 LAB — HBA1C MFR BLD: 5.9 % (ref 0–5.6)

## 2020-07-20 NOTE — TELEPHONE ENCOUNTER
Prior Authorization Approval    Authorization Effective Date: 7/17/2020  Authorization Expiration Date: 7/17/2021  Medication: Trikafta-APPROVED  Approved Dose/Quantity: 84 PER 28 DAYS  Reference #:     Insurance Company: [employer] - Patient Feed [phone] Fax [phone]  Expected CoPay:       CoPay Card Available: Yes    Foundation Assistance Needed:    Which Pharmacy is filling the prescription (Not needed for infusion/clinic administered): [facility] - [place], [place]  [facility] [address]  Pharmacy Notified:    Patient Notified:

## 2020-09-02 ENCOUNTER — VIRTUAL VISIT (OUTPATIENT)
Dept: PULMONOLOGY | Facility: CLINIC | Age: 55
End: 2020-09-02
Attending: INTERNAL MEDICINE
Payer: COMMERCIAL

## 2020-09-02 DIAGNOSIS — E84.9 CYSTIC FIBROSIS (H): Primary | ICD-10-CM

## 2020-09-02 NOTE — LETTER
"    9/2/2020         RE: Michele Altamirano  [address] [facility] [facility] [address]        Dear Colleague,    Thank you for referring your patient, Michele Altamirano, to the [facility]. Please see a copy of my visit note below.    Michele Altamirano is a 54 year old male who is being evaluated via a billable video visit.      The patient has been notified of following:     \"This video visit will be conducted via a call between you and your physician/provider. We have found that certain health care needs can be provided without the need for an in-person physical exam.  This service lets us provide the care you need with a video conversation.  If a prescription is necessary we can send it directly to your pharmacy.  If lab work is needed we can place an order for that and you can then stop by our lab to have the test done at a later time.    Video visits are billed at different rates depending on your insurance coverage.  Please reach out to your insurance provider with any questions.    If during the course of the call the physician/provider feels a video visit is not appropriate, you will not be charged for this service.\"    Patient has given verbal consent for Video visit? Yes  How would you like to obtain your AVS? MyChart  If you are dropped from the video visit, the video invite should be resent to: Text to cell phone: [phone]  Will anyone else be joining your video visit? No    [facility]  September 2, 2020         Assessment and Plan:   Michele Altamirano is a 54 year old male with cystic fibrosis.    1. 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  He is able to do home spirometry and is seen an improvement in his values.  He reports a nice symptom improvement since starting trikafta.  Michele historically has grown out Pseudomonas.  At this time I have recommended:  --Michele " continue to do his nebulized therapy 1-2 times per day  --Michele continues his inhalational [place] therapy on 2 weeks off 2 weeks  --He does continue to use his vest as needed    2. Pancreatic Insufficiency:  The patient has no new symptoms consistent with worsening malabsorption.    - continue the present dose of pancreatic enzymes  - continue vitamin supplementation.    3.  CFTR modulator therapy: Michele is on trikafta and tolerating well.  He is due to get an hepatic panel in October.  We will arrange to have this done in the [facility].    4.  Abnormal glucose tolerance: Michele does follow his blood sugars and reports that he is has seen improvement since starting try [place].  He also is pleased that his A1c has gone down.    5.  Insomnia: Is describing ongoing difficulty with sleep.  He only is able to sleep 5 to 6 hours per night.  He has tried medicinal approach this but feels that this only exacerbates his restless leg syndrome.  We spent some time today discussing some options to improve his leg discomfort.  We discussed stretching leg elevation really on his feet and then being careful of the shoes he is wearing during the day.    6.  Hypertension: Michele reports fairly good control of his blood sugar.  This is managed by his primary care physician.    7.  Psychosocial: Michele is  and has an son.  He reports that work is busy any is onsite for his work.  He is biking up to 16 miles couple times a week.  He is able to run about 2 miles.    Annual studies due: due now  Immunizations: UTD  Colonoscopy: need to review    Siobhan Munoz MD MPH  Associate Professor of Medicine  Pulmonary, Allergy, Critical Care and Sleep Medicine      Interval History:     Michele reports little in the way of cough or sputum production.  He denies any difficulty with his activity tolerance.  Again he still is doing 1-2 nebs per day.         Review of Systems:     CONSTITUTIONAL: no fever, no chills, no sweats, no change in  weight--careful about maintenance, no change in energy, no change in appetite    INTEGUMENTARY/SKIN: no rash, no obvious new lesions    ENT/MOUTH: no sore throat, no new sinus pain, + nasal drainage with start of trikafta, no ear ringing     RESPIRATORY: see interval history    CV: no chest pain, no palpitations, minimal peripheral edema, no orthopnea, no PND    GI: no nausea, no vomiting, no change in stools, no fatty stools, controlled GERD, no abdominal pain    : no dysuria, no urinary frequency    MUSCULOSKELETAL: no myalgias, no arthralgias    ENDOCRINE: no excessive thirst, blood sugars with adequate control, A1C improved    NEURO:  No headache, no numbness, no tingling    SLEEP: struggling to get more than 5 to 6 hours, some restless leg    PSYCHIATRIC: mood good          Past Medical and Surgical History:     Past Medical History:   Diagnosis Date     CAP (community acquired pneumonia)     2011     CF (cystic fibrosis) (H)     diagnosed 1969, malnutrition, staph empyema     Chronic knee pain      Diverticular disease 2018    see on colonoscopy     GERD (gastroesophageal reflux disease)     egd in the past     Pancreatic insufficiency      Past Surgical History:   Procedure Laterality Date     ENDOSCOPIC SINUS SURGERY      removal of mucocele behind eye, 1992     OPTICAL TRACKING SYSTEM ENDOSCOPIC SINUS SURGERY Bilateral 11/28/2016    Procedure: OPTICAL TRACKING SYSTEM ENDOSCOPIC SINUS SURGERY;  Surgeon: Harriett Cosby MD;  Location:  OR           Family History:     Family History   Problem Relation Age of Onset     Lipids Father      Cardiovascular Father      Diabetes Maternal Grandmother         type 2            Social History:     Social History     Socioeconomic History     Marital status:      Spouse name: Not on file     Number of children: 1     Years of education: Not on file     Highest education level: Not on file   Occupational History     Occupation:    Social Needs      Financial resource strain: Not on file     Food insecurity     Worry: Not on file     Inability: Not on file     Transportation needs     Medical: Not on file     Non-medical: Not on file   Tobacco Use     Smoking status: Never Smoker     Smokeless tobacco: Never Used   Substance and Sexual Activity     Alcohol use: No     Alcohol/week: 0.0 standard drinks     Drug use: No     Sexual activity: Yes     Partners: Female     Birth control/protection: None   Lifestyle     Physical activity     Days per week: Not on file     Minutes per session: Not on file     Stress: Not on file   Relationships     Social connections     Talks on phone: Not on file     Gets together: Not on file     Attends Congregational service: Not on file     Active member of club or organization: Not on file     Attends meetings of clubs or organizations: Not on file     Relationship status: Not on file     Intimate partner violence     Fear of current or ex partner: Not on file     Emotionally abused: Not on file     Physically abused: Not on file     Forced sexual activity: Not on file   Other Topics Concern     Parent/sibling w/ CABG, MI or angioplasty before 65F 55M? Not Asked   Social History Narrative    8/18/2020 --Lives in [place] with his wife and 15 yo son (Raghu).  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            Medications:     Current Outpatient Medications   Medication     albuterol (PROAIR HFA/PROVENTIL HFA/VENTOLIN HFA) 108 (90 Base) MCG/ACT inhaler     amylase-lipase-protease (ZENPEP) 11953-26013 units CPEP     azithromycin (ZITHROMAX) 500 MG tablet     aztreonam lysine (CAYSTON) 75 MG SOLR     Cholecalciferol (VITAMIN D) 2000 UNITS CAPS     elexacaftor-tezacaftor-ivacaftor & ivacaftor (TRIKAFTA) 100-50-75 & 150 MG tablet pack     hydrochlorothiazide (MICROZIDE) 12.5 MG capsule     multivitamin CF formula (AQUADEKS) chewable tablet     pantoprazole (PROTONIX) 40 MG EC  tablet     EDU ALTERA NEBULIZER SYSTEM [facility] [place]     Res[place] Therapy Supplies (EDU ALTERA NEBULIZER HANDSET) [facility] [place]     Respiratory Therapy Supplies (EDU ALTERA NEBULIZER HANDSET) MISC     sodium chloride inhalant 7 % NEBU neb solution     No current facility-administered medications for this visit.             Physical Exam:   There were no vitals taken for this visit.    GENERAL: Healthy, alert and no distress  EYES: Eyes grossly normal to inspection.  No discharge or erythema, or obvious scleral/conjunctival abnormalities.  RESP: No audible wheeze, cough, or visible cyanosis.  No visible retractions or increased work of breathing.    SKIN: Visible skin clear. No significant rash, abnormal pigmentation or lesions.  NEURO: Cranial nerves grossly intact.  Mentation and speech appropriate for age.  PSYCH: Mentation appears normal, affect normal/bright, judgement and insight intact, normal speech and appearance well-groomed.         Data:   All laboratory and imaging data reviewed.    Cystic Fibrosis Culture  Specimen Description   Date Value Ref Range Status   02/09/2018 Sputum  Final   12/22/2017 Sputum  Final   01/12/2016 Sputum  Final   01/12/2016 Sputum  Final   01/12/2016 Sputum  Final    Culture Micro   Date Value Ref Range Status   02/09/2018 Moderate growth  Normal tessa    Final   02/09/2018 (A)  Final    Moderate growth  Pseudomonas aeruginosa, mucoid strain     02/09/2018 Moderate growth  Pseudomonas aeruginosa   (A)  Final   02/09/2018 Light growth  Staphylococcus aureus   (A)  Final        No results found for this or any previous visit (from the past 168 hour(s)).    PFT:  Not performed.    Pulmonary exacerbation: absent    Video-Visit Details    Type of service:  Video Visit    Video Start Time: 8:13  Video End Time: 9:09    Originating Location (pt. Location): Home    Distant Location (provider location):  [facility]     Platform used for Video Visit:  [employer]            Again, thank you for allowing me to participate in the care of your patient.        Sincerely,        Siobhan Munoz MD

## 2020-09-02 NOTE — PROGRESS NOTES
"Michele Altamirano is a 54 year old male who is being evaluated via a billable video visit.      The patient has been notified of following:     \"This video visit will be conducted via a call between you and your physician/provider. We have found that certain health care needs can be provided without the need for an in-person physical exam.  This service lets us provide the care you need with a video conversation.  If a prescription is necessary we can send it directly to your pharmacy.  If lab work is needed we can place an order for that and you can then stop by our lab to have the test done at a later time.    Video visits are billed at different rates depending on your insurance coverage.  Please reach out to your insurance provider with any questions.    If during the course of the call the physician/provider feels a video visit is not appropriate, you will not be charged for this service.\"    Patient has given verbal consent for Video visit? Yes  How would you like to obtain your AVS? MyChart  If you are dropped from the video visit, the video invite should be resent to: Text to cell phone: [phone]  Will anyone else be joining your video visit? No    [facility]  September 2, 2020         Assessment and Plan:   Michele Altamirano is a 54 year old male with cystic fibrosis.    1. 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  He is able to do home spirometry and is seen an improvement in his values.  He reports a nice symptom improvement since starting trikafta.  Michele historically has grown out Pseudomonas.  At this time I have recommended:  --Michele continue to do his nebulized therapy 1-2 times per day  --Michele continues his inhalational [place] therapy on 2 weeks off 2 weeks  --He does continue to use his vest as needed    2. Pancreatic Insufficiency:  The patient has no new symptoms " consistent with worsening malabsorption.    - continue the present dose of pancreatic enzymes  - continue vitamin supplementation.    3.  CFTR modulator therapy: Michele is on trikafta and tolerating well.  He is due to get an hepatic panel in October.  We will arrange to have this done in the [facility].    4.  Abnormal glucose tolerance: Michele does follow his blood sugars and reports that he is has seen improvement since starting try [place].  He also is pleased that his A1c has gone down.    5.  Insomnia: Is describing ongoing difficulty with sleep.  He only is able to sleep 5 to 6 hours per night.  He has tried medicinal approach this but feels that this only exacerbates his restless leg syndrome.  We spent some time today discussing some options to improve his leg discomfort.  We discussed stretching leg elevation really on his feet and then being careful of the shoes he is wearing during the day.    6.  Hypertension: Michele reports fairly good control of his blood sugar.  This is managed by his primary care physician.    7.  Psychosocial: Michele is  and has an son.  He reports that work is busy any is onsite for his work.  He is biking up to 16 miles couple times a week.  He is able to run about 2 miles.    Annual studies due: due now  Immunizations: UTD  Colonoscopy: need to review    Siobhan Munoz MD MPH  Associate Professor of Medicine  Pulmonary, Allergy, Critical Care and Sleep Medicine      Interval History:     Michele reports little in the way of cough or sputum production.  He denies any difficulty with his activity tolerance.  Again he still is doing 1-2 nebs per day.         Review of Systems:     CONSTITUTIONAL: no fever, no chills, no sweats, no change in weight--careful about maintenance, no change in energy, no change in appetite    INTEGUMENTARY/SKIN: no rash, no obvious new lesions    ENT/MOUTH: no sore throat, no new sinus pain, + nasal drainage with start of trikafta, no ear ringing      RESPIRATORY: see interval history    CV: no chest pain, no palpitations, minimal peripheral edema, no orthopnea, no PND    GI: no nausea, no vomiting, no change in stools, no fatty stools, controlled GERD, no abdominal pain    : no dysuria, no urinary frequency    MUSCULOSKELETAL: no myalgias, no arthralgias    ENDOCRINE: no excessive thirst, blood sugars with adequate control, A1C improved    NEURO:  No headache, no numbness, no tingling    SLEEP: struggling to get more than 5 to 6 hours, some restless leg    PSYCHIATRIC: mood good          Past Medical and Surgical History:     Past Medical History:   Diagnosis Date     CAP (community acquired pneumonia)     2011     CF (cystic fibrosis) (H)     diagnosed 1969, malnutrition, staph empyema     Chronic knee pain      Diverticular disease 2018    see on colonoscopy     GERD (gastroesophageal reflux disease)     egd in the past     Pancreatic insufficiency      Past Surgical History:   Procedure Laterality Date     ENDOSCOPIC SINUS SURGERY      removal of mucocele behind eye, 1992     OPTICAL TRACKING SYSTEM ENDOSCOPIC SINUS SURGERY Bilateral 11/28/2016    Procedure: OPTICAL TRACKING SYSTEM ENDOSCOPIC SINUS SURGERY;  Surgeon: Harriett Cosby MD;  Location:  OR           Family History:     Family History   Problem Relation Age of Onset     Lipids Father      Cardiovascular Father      Diabetes Maternal Grandmother         type 2            Social History:     Social History     Socioeconomic History     Marital status:      Spouse name: Not on file     Number of children: 1     Years of education: Not on file     Highest education level: Not on file   Occupational History     Occupation:    Social Needs     Financial resource strain: Not on file     Food insecurity     Worry: Not on file     Inability: Not on file     Transportation needs     Medical: Not on file     Non-medical: Not on file   Tobacco Use     Smoking status: Never Smoker      Smokeless tobacco: Never Used   Substance and Sexual Activity     Alcohol use: No     Alcohol/week: 0.0 standard drinks     Drug use: No     Sexual activity: Yes     Partners: Female     Birth control/protection: None   Lifestyle     Physical activity     Days per week: Not on file     Minutes per session: Not on file     Stress: Not on file   Relationships     Social connections     Talks on phone: Not on file     Gets together: Not on file     Attends Shinto service: Not on file     Active member of club or organization: Not on file     Attends meetings of clubs or organizations: Not on file     Relationship status: Not on file     Intimate partner violence     Fear of current or ex partner: Not on file     Emotionally abused: Not on file     Physically abused: Not on file     Forced sexual activity: Not on file   Other Topics Concern     Parent/sibling w/ CABG, MI or angioplasty before 65F 55M? Not Asked   Social History Narrative    8/18/2020 --Lives in [place] with his wife and 15 yo son (Raghu).  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            Medications:     Current Outpatient Medications   Medication     albuterol (PROAIR HFA/PROVENTIL HFA/VENTOLIN HFA) 108 (90 Base) MCG/ACT inhaler     amylase-lipase-protease (ZENPEP) 28225-35492 units CPEP     azithromycin (ZITHROMAX) 500 MG tablet     aztreonam lysine (CAYSTON) 75 MG SOLR     Cholecalciferol (VITAMIN D) 2000 UNITS CAPS     elexacaftor-tezacaftor-ivacaftor & ivacaftor (TRIKAFTA) 100-50-75 & 150 MG tablet pack     hydrochlorothiazide (MICROZIDE) 12.5 MG capsule     multivitamin CF formula (AQUADEKS) chewable tablet     pantoprazole (PROTONIX) 40 MG EC tablet     EDU ALTERA NEBULIZER SYSTEM MISC     Respiratory Therapy Supplies (EDU ALTERA NEBULIZER HANDSET) MISC     Respiratory Therapy Supplies (EDU ALTERA NEBULIZER HANDSET) MISC     sodium chloride inhalant 7 % NEBU neb solution      No current facility-administered medications for this visit.             Physical Exam:   There were no vitals taken for this visit.    GENERAL: Healthy, alert and no distress  EYES: Eyes grossly normal to inspection.  No discharge or erythema, or obvious scleral/conjunctival abnormalities.  RESP: No audible wheeze, cough, or visible cyanosis.  No visible retractions or increased work of breathing.    SKIN: Visible skin clear. No significant rash, abnormal pigmentation or lesions.  NEURO: Cranial nerves grossly intact.  Mentation and speech appropriate for age.  PSYCH: Mentation appears normal, affect normal/bright, judgement and insight intact, normal speech and appearance well-groomed.         Data:   All laboratory and imaging data reviewed.    Cystic Fibrosis Culture  Specimen Description   Date Value Ref Range Status   02/09/2018 Sputum  Final   12/22/2017 Sputum  Final   01/12/2016 Sputum  Final   01/12/2016 Sputum  Final   01/12/2016 Sputum  Final    Culture Micro   Date Value Ref Range Status   02/09/2018 Moderate growth  Normal tessa    Final   02/09/2018 (A)  Final    Moderate growth  Pseudomonas aeruginosa, mucoid strain     02/09/2018 Moderate growth  Pseudomonas aeruginosa   (A)  Final   02/09/2018 Light growth  Staphylococcus aureus   (A)  Final        No results found for this or any previous visit (from the past 168 hour(s)).    PFT:  Not performed.    Pulmonary exacerbation: absent    Video-Visit Details    Type of service:  Video Visit    Video Start Time: 8:13  Video End Time: 9:09    Originating Location (pt. Location): Home    Distant Location (provider location):  [facility]     Platform used for Video Visit: [employer]

## 2020-09-02 NOTE — PATIENT INSTRUCTIONS
Cystic Fibrosis Self-Care Plan    RECOMMENDATIONS:   Michele,  It was great to talk to you today.  I am glad that you are feeling well on trikafta.  The plan from today:  --Will plan for repeat labs in October.  We will send orders to [facility].  You are due for annual studies, so we can do them at that time.  --Work on stretches, rolling out your feet and new shoes for your restless leg  Great job with self cares.    YOUR GOAL: Enjoy the start of fall!      [facility] Nurse line:  Shayna Marlow KJ and Savita [phone]     [facility] Fax Number:      [phone]         Cystic Fibrosis Respiratory Therapists:   Alyse Cheek              [phone]          Consuelo Bonilla   [phone]  Cystic Fibrosis Dietitians:              Willa Romero              [phone]                            Alondra Vitale                        [phone]   Cystic Fibrosis Diabetes Nurse:    Livia Mondragon   [phone]    Cystic Fibrosis Social Workers:     Kaykay Vieyra               [phone]                     Rayna Díaz               [phone]  Cystic Fibrosis Pharmacists:           Jael Arvizu                               [phone]         Ameena uGerrero   [phone]  Cystic Fibrosis Genetic Counselor:   Charisse Reveles    [phone]    [facility] website:  [email or web address]         MRN: [id number]   Clinic Date: September 2, 2020   Patient: Michele Altamirano     Annual Studies:   IGG   Date Value Ref Range Status   02/09/2018 883 695 - 1,620 mg/dL Final     Insulin   Date Value Ref Range Status   02/09/2018 36.2 (H) 3 - 25 mU/L Final     Comment:     Starting 7/13/2017, insulin results will decrease by approximately 37%   compared to insulin results reported in EPIC between (12/16/2016-7/12/2017),   and should be interpreted accordingly. Insulin results reported prior to   12/16/16 are comparable to current insulin results and therefore no  adjustment   is needed.       There are no preventive care reminders to display for this patient.    Pulmonary Function Tests  FEV1: amount of air you can blow out in 1 second  FVC: total amount of air you can take in and blow out    Your Goals:         PFT Latest Ref Rng & Units 4/17/2019   FVC L 3.91   FEV1 L 2.55   FVC% % 82   FEV1% % 68          Airway Clearance: The Most Important Way to Keep Your Lungs Healthy  Vest Settings:    Hill-Rom Frequencies: 8, 9, 10 Pressure 10 Then, Frequencies 18, 19, 20 Pressure 6      RespirTech: Quick Start with Pressure of     Do each frequency for 5 minutes; Deflate vest after each frequency & cough 3 times before beginning the next setting.    Vest and Neb Therapy should be done 1 times/day.    Good Nutrition Can Improve Lung Function and Overall Health     Take ALL of your vitamins with food     Take 1/2 of your enzymes before EVERY meal/snack and the other 1/2 mid-meal/snack    Wt Readings from Last 3 Encounters:   04/17/19 73.5 kg (162 lb)   10/24/18 71 kg (156 lb 8.4 oz)   02/09/18 72.3 kg (159 lb 4.8 oz)       There is no height or weight on file to calculate BMI.         National CF Foundation Recommendations for BMI in CF Adults: Women: at least 22 Men: at least 23        Controlling Blood Sugars Helps Prevent Lung Infections & Improves Nutrition  Test blood sugar:     In the morning before eating (goal is )     2 hours after a meal (goal is less than 150)     When pre-meal glucose is greater than 150 add correction     At bedtime (if less than 100 eat a snack with 15 grams of carbohydrates  Last A1C Results:   Hemoglobin A1C   Date Value Ref Range Status   07/14/2020 5.9 (A) 0 - 5.6 % Final         If diabetic, measure A1C every 6 months. Goal: Under 7%    Staying Healthy    Research:  If you are interested in learning about research opportunities or have questions, please contact the CF Research Team at [phone] or [email or web address].      CF  Foundation:  Compass is a personalized resource service to help you with the insurance, financial, legal and other issues you are facing.  It's free, confidential and available to anyone with CF.  Ask your  for more information or contact Compass directly at 699-CJQMWZW ([phone]) or compass@cff.org, or learn more at cff.org/compass.          Annual studies: due now  Immunizations UTD  Colonoscopy: review  With patient

## 2020-09-03 ENCOUNTER — MYC MEDICAL ADVICE (OUTPATIENT)
Dept: PULMONOLOGY | Facility: CLINIC | Age: 55
End: 2020-09-03

## 2020-09-03 ENCOUNTER — TELEPHONE (OUTPATIENT)
Dept: PULMONOLOGY | Facility: CLINIC | Age: 55
End: 2020-09-03

## 2020-09-05 DIAGNOSIS — E55.9 VITAMIN D DEFICIENCY: ICD-10-CM

## 2020-09-05 DIAGNOSIS — E84.0 CYSTIC FIBROSIS WITH PULMONARY MANIFESTATIONS (H): ICD-10-CM

## 2020-09-05 DIAGNOSIS — K86.89 PANCREATIC INSUFFICIENCY: ICD-10-CM

## 2020-09-05 DIAGNOSIS — K21.9 GASTROESOPHAGEAL REFLUX DISEASE WITHOUT ESOPHAGITIS: ICD-10-CM

## 2020-09-08 ENCOUNTER — TELEPHONE (OUTPATIENT)
Dept: PULMONOLOGY | Facility: CLINIC | Age: 55
End: 2020-09-08

## 2020-09-08 RX ORDER — PANTOPRAZOLE SODIUM 40 MG/1
TABLET, DELAYED RELEASE ORAL
Qty: 180 TABLET | Refills: 3 | Status: SHIPPED | OUTPATIENT
Start: 2020-09-08 | End: 2021-07-26

## 2020-10-10 DIAGNOSIS — E84.9 CF (CYSTIC FIBROSIS) (H): ICD-10-CM

## 2020-10-10 DIAGNOSIS — J47.9 BRONCHIECTASIS WITHOUT ACUTE EXACERBATION (H): ICD-10-CM

## 2020-10-12 RX ORDER — ALBUTEROL SULFATE 90 UG/1
AEROSOL, METERED RESPIRATORY (INHALATION)
Qty: 54 G | Refills: 3 | Status: SHIPPED | OUTPATIENT
Start: 2020-10-12

## 2020-10-27 DIAGNOSIS — E84.9 CF (CYSTIC FIBROSIS) (H): ICD-10-CM

## 2020-10-28 RX ORDER — ELEXACAFTOR, TEZACAFTOR, AND IVACAFTOR 100-50-75
KIT ORAL
Qty: 84 EACH | Refills: 0 | Status: SHIPPED | OUTPATIENT
Start: 2020-10-28 | End: 2020-12-02

## 2020-11-03 ENCOUNTER — TRANSFERRED RECORDS (OUTPATIENT)
Dept: HEALTH INFORMATION MANAGEMENT | Facility: CLINIC | Age: 55
End: 2020-11-03

## 2020-11-03 LAB
ANION GAP SERPL CALCULATED.3IONS-SCNC: NORMAL MMOL/L
BUN SERPL-MCNC: NORMAL MG/DL
CALCIUM SERPL-MCNC: 9.7 MG/DL
CHLORIDE SERPLBLD-SCNC: 102 MMOL/L
CHOLEST SERPL-MCNC: 222 MG/DL
CO2 SERPL-SCNC: 27.4 MMOL/L
CREAT SERPL-MCNC: 0.9 MG/DL
DIFFERENTIAL: NORMAL
ERYTHROCYTE [DISTWIDTH] IN BLOOD BY AUTOMATED COUNT: 14.5 %
ERYTHROCYTE [SEDIMENTATION RATE] IN BLOOD: 6 MM/HR
ERYTHROCYTE [SEDIMENTATION RATE] IN BLOOD: 6 MM/HR
GFR SERPL CREATININE-BSD FRML MDRD: NORMAL ML/MIN/{1.73_M2}
GLUCOSE SERPL-MCNC: 73 MG/DL (ref 70–99)
HCT VFR BLD AUTO: 46.3 %
HDLC SERPL-MCNC: 72 MG/DL
HEMOGLOBIN: 16.1 G/DL (ref 13.3–17.7)
INR PPP: 1 (ref 0.9–1.1)
LDLC SERPL CALC-MCNC: 113 MG/DL
MCH RBC QN AUTO: 30 PG
MCHC RBC AUTO-ENTMCNC: 34.8 G/DL
MCV RBC AUTO: 86 FL
NONHDLC SERPL-MCNC: NORMAL MG/DL
PLATELET # BLD AUTO: 242 10^9/L
POTASSIUM SERPL-SCNC: 4.3 MMOL/L
RBC # BLD AUTO: 5.36 10^12/L
SODIUM SERPL-SCNC: 141 MMOL/L
TRIGL SERPL-MCNC: 184 MG/DL
WBC # BLD AUTO: 4.64 10^9/L

## 2020-11-24 LAB
ALBUMIN SERPL-MCNC: 4.6 G/DL
ALP SERPL-CCNC: 88 U/L
ALT SERPL-CCNC: 46 U/L
AST SERPL-CCNC: 39 U/L
BILIRUB SERPL-MCNC: 1.5 MG/DL
BILIRUBIN DIRECT: 0.3 MG/DL
CK TOTAL: 163 U/L
DIFFERENTIAL: NORMAL
ERYTHROCYTE [DISTWIDTH] IN BLOOD BY AUTOMATED COUNT: NORMAL %
HBA1C MFR BLD: 6.1 % (ref 0–5.6)
HCT VFR BLD AUTO: NORMAL %
HEMOGLOBIN: NORMAL
IGE: 17
INR PPP: 1 (ref 0.9–1.1)
MAGNESIUM SERPL-MCNC: 2.1 MG/DL
MCH RBC QN AUTO: NORMAL PG
MCHC RBC AUTO-ENTMCNC: NORMAL G/DL
MCV RBC AUTO: NORMAL FL
PHOSPHATE SERPL-MCNC: 3.9 MG/DL
PLATELET # BLD AUTO: NORMAL 10*3/UL
PROT SERPL-MCNC: 7.4 G/DL
RBC # BLD AUTO: NORMAL 10*6/UL
TESTOST SERPL-MCNC: 721 NG/DL
TSH SERPL-ACNC: 1.66 MCU/ML
WBC # BLD AUTO: NORMAL 10*3/UL

## 2020-11-30 DIAGNOSIS — E84.9 CF (CYSTIC FIBROSIS) (H): ICD-10-CM

## 2020-12-02 ENCOUNTER — CLINICAL UPDATE (OUTPATIENT)
Dept: PHARMACY | Facility: CLINIC | Age: 55
End: 2020-12-02
Payer: COMMERCIAL

## 2020-12-02 DIAGNOSIS — E84.9 CYSTIC FIBROSIS (H): Primary | ICD-10-CM

## 2020-12-02 PROCEDURE — 99207 PR NO CHARGE LOS: CPT | Performed by: PHARMACIST

## 2020-12-02 RX ORDER — ELEXACAFTOR, TEZACAFTOR, AND IVACAFTOR 100-50-75
KIT ORAL
Qty: 84 EACH | Refills: 2 | Status: SHIPPED | OUTPATIENT
Start: 2020-12-02 | End: 2021-02-23

## 2020-12-02 NOTE — PROGRESS NOTES
Clinical Update:                                                    At the request of Dr. Munoz, a chart review was conducted for Michele Altamirano.    Reason for Chart Review: Lab review/Trikafta update    Discussion: Michele had labs drawn 11/3/20 per recommended Trikafta monitoring.  All results are within normal limits.    Plan:  1. Continue Trikafta (start date was July 2020)  2. Repeat hepatic panel and CK in 3 months  3. Trikafta prescription renewed for 3 months    Jael Arvizu PharmD  CF Medication Therapy Management Pharmacist  [facility]  [phone]

## 2020-12-06 ENCOUNTER — HEALTH MAINTENANCE LETTER (OUTPATIENT)
Age: 55
End: 2020-12-06

## 2021-01-06 ENCOUNTER — VIRTUAL VISIT (OUTPATIENT)
Dept: PULMONOLOGY | Facility: CLINIC | Age: 56
End: 2021-01-06
Attending: INTERNAL MEDICINE
Payer: COMMERCIAL

## 2021-01-06 DIAGNOSIS — E84.0 CYSTIC FIBROSIS WITH PULMONARY MANIFESTATIONS (H): Primary | ICD-10-CM

## 2021-01-06 PROBLEM — G25.81 RESTLESS LEGS SYNDROME: Status: ACTIVE | Noted: 2021-01-06

## 2021-01-06 PROBLEM — R73.02 IMPAIRED GLUCOSE TOLERANCE: Status: ACTIVE | Noted: 2021-01-06

## 2021-01-06 PROBLEM — J32.9 RECURRENT SINUSITIS: Status: ACTIVE | Noted: 2021-01-06

## 2021-01-06 PROBLEM — M25.569 KNEE PAIN: Status: ACTIVE | Noted: 2021-01-06

## 2021-01-06 PROBLEM — G57.62 MORTON'S NEUROMA, LEFT: Status: ACTIVE | Noted: 2017-11-16

## 2021-01-06 PROBLEM — K21.9 GASTROESOPHAGEAL REFLUX DISEASE: Status: ACTIVE | Noted: 2021-01-06

## 2021-01-06 PROCEDURE — 99215 OFFICE O/P EST HI 40 MIN: CPT | Mod: GT | Performed by: INTERNAL MEDICINE

## 2021-01-06 NOTE — PATIENT INSTRUCTIONS
Cystic Fibrosis Self-Care Plan    RECOMMENDATIONS:   Michele, it was great to see you today.  I am glad that you are feeling well.  Work sounds both busy and interesting.  The plan from today:  --will send orders to the [facility] for a lab draw in a few weeks  --Will have Alondra called about vitamin levels  --sleep medicine referral for restless leg  Great job with self cares!    YOUR GOAL: Stay safe and enjoy the new year!      [facility] [place] Nurse line:  Jodi Corral [phone]     [facility] [place] Fax Number:      [phone]         Cystic Fibrosis Respiratory Therapists:   Alyse Cheek              [phone]          Consuelo Bonilla   [phone]  Cystic Fibrosis Dietitians:              Willa Romero              [phone]                            Alondra Vitale                        [phone]   Cystic Fibrosis Diabetes Nurse:    Livia Mondragon   [phone]    Cystic Fibrosis Social Workers:     Kaykay Vieyra               [phone]                     Rayna Díaz               [phone]  Cystic Fibrosis Pharmacists:           Jael Arvizu                               [phone]         Ameena Guerrero   [phone]  Cystic Fibrosis Genetic Counselor:   Charisse Reveles    [phone]    [facility] [place] website:  [email or web address]    The following are the [place] Department of Health and CDC Winter Holidays Recommendations with the [facility] fibrosis [place] modifications to the the risk activities.     https://www.cdc.gov/coronavirus/2019-ncov/daily-life-coping/holidays/winter.html    https://www.health.state.[place]/diseases/coronavirus/holidays.html    Celebrate the winter holidays safely this year and help stop the spread of COVID-19 by choosing lower-risk activities.    Lower-risk activities  A small dinner with the people who live with you. See CDC: Food and Coronavirus Disease 2019 (COVID-19).  A virtual dinner and  sharing recipes with friends and family.  Shopping online, rather than in person.  Preparing traditional family recipes for family and neighbors, especially those at higher risk of severe illness from COVID-19, and deliver them in a way that doesn't involve contact with others.    Avoid Medium-risk activities  A small outdoor dinner with family and friends who live in your community. See CDC: Food and Coronavirus Disease 2019 (COVID-19). Lower your risk by following CDC recommendations for hosting gatherings or cook-outs.  Small outdoor sports events, if you follow safety tips above.    Definitely avoid higher-risk activities  Avoid these activities to help stop the spread of COVID-19.    Shopping in crowded stores.  Attending or participating in crowded races or parades.  Attending large indoor gatherings with people who do not live with you.         MRN: [id number]   Clinic Date: January 6, 2021   Patient: Michele Altamirano     Annual Studies:   IGG   Date Value Ref Range Status   02/09/2018 883 695 - 1,620 mg/dL Final     Insulin   Date Value Ref Range Status   02/09/2018 36.2 (H) 3 - 25 mU/L Final     Comment:     Starting 7/13/2017, insulin results will decrease by approximately 37%   compared to insulin results reported in EPIC between (12/16/2016-7/12/2017),   and should be interpreted accordingly. Insulin results reported prior to   12/16/16 are comparable to current insulin results and therefore no adjustment   is needed.       There are no preventive care reminders to display for this patient.    Pulmonary Function Tests  FEV1: amount of air you can blow out in 1 second  FVC: total amount of air you can take in and blow out    Your Goals:         PFT Latest Ref Rng & Units 4/17/2019   FVC L 3.91   FEV1 L 2.55   FVC% % 82   FEV1% % 68          Airway Clearance: The Most Important Way to Keep Your Lungs Healthy  Vest Settings:    Hill-Rom Frequencies: 8, 9, 10 Pressure 10 Then, Frequencies 18, 19, 20 Pressure  6      RespirTech: Quick Start with Pressure of     Do each frequency for 5 minutes; Deflate vest after each frequency & cough 3 times before beginning the next setting.    Vest and Neb Therapy should be done 1 times/day.    Good Nutrition Can Improve Lung Function and Overall Health     Take ALL of your vitamins with food     Take 1/2 of your enzymes before EVERY meal/snack and the other 1/2 mid-meal/snack    Wt Readings from Last 3 Encounters:   04/17/19 73.5 kg (162 lb)   10/24/18 71 kg (156 lb 8.4 oz)   02/09/18 72.3 kg (159 lb 4.8 oz)       There is no height or weight on file to calculate BMI.    National CF Foundation Recommendations for BMI in CF Adults: Women: at least 22 Men: at least 23     Controlling Blood Sugars Helps Prevent Lung Infections & Improves Nutrition  Test blood sugar:     In the morning before eating (goal is )     2 hours after a meal (goal is less than 150)     When pre-meal glucose is greater than 150 add correction     At bedtime (if less than 100 eat a snack with 15 grams of carbohydrates  Last A1C Results:   Hemoglobin A1C   Date Value Ref Range Status   11/03/2020 6.1 (A) 0 - 5.6 % Final       If diabetic, measure A1C every 6 months. Goal: Under 7%    Staying Healthy    Research:  If you are interested in learning about research opportunities or have questions, please contact the CF Research Team at [phone] or [email or web address].      CF Foundation:  Compass is a personalized resource service to help you with the insurance, financial, legal and other issues you are facing.  It's free, confidential and available to anyone with CF.  Ask your  for more information or contact Compass directly at [phone] ([phone]) or compass@cff.org, or learn more at cff.org/compass.

## 2021-01-06 NOTE — LETTER
1/6/2021         RE: Michele Altamirano  [address] [facility] [address]        Dear Colleague,    Thank you for referring your patient, Michele Altamirano, to the [facility] [place]. Please see a copy of my visit note below.    Michele Altamirano is a 55 year old male who is being evaluated via a billable video visit.      How would you like to obtain your AVS? MyChart  If the video visit is dropped, the invitation should be resent by: Other e-mail: [email or web address]  Will anyone else be joining your video visit? No    [facility]  January 6, 2021         Assessment and Plan:   Michele Altamirano is a 55 year old male with cystic fibrosis.    1. 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  He was able to go an extended period of time without any nebs but did not feel after several days that he could appreciate some chest congestion.  Michele historically has grown out Pseudomonas in his sputum.  He has consistently shown stable pulmonary function.  At this time I have recommended to Michele:  --He should continue to do his nebulized therapy 1-2 times daily  --Michele should continue his [place] on 2 weeks off 2 weeks    2. Pancreatic Insufficiency/GI: Michele has no new symptoms consistent with worsening malabsorption.  Michele does appreciate that there is less fat in his stool compared to pre-Trikafta.  He has worked on some intentional weight loss because of weight gain that resulted from being on Trikafta.  He is restricting some of his dietary intake and doing more exercise.  I have asked Alondra our dietitian to contact Michele regarding his vitamin levels which were obtained in November.  - continue the present dose of pancreatic enzymes  - continue vitamin supplementation.    3.  Abnormal glucose tolerance: Michele reports that he is very attentive to his hemoglobin A1c.  He reports he is  never recorded a blood sugar over 240.  He is aware that he should be cautious related to the possibilities of becoming diabetic.    4.  CF TR modulator therapy: Michele is on Trikafta and tolerating well.  He has had stable hepatic panel.  I have asked him to have repeat labs done at the [facility] sometime about a month from now.    5.  Restless leg syndrome: Michele continues to struggle with sleep largely due to restless leg syndrome.  I have referred him to our sleep clinic for further evaluation.    6.  Psychosocial: Michele continues to work full-time.  He is spending 2 days at home with work.  Reports work is very busy and interesting.  He reports also that his family is doing well.  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    Annual studies due: UTD  Immunizations: UTD  Colonoscopy: review    Siobhan Munoz MD MPH  Associate Professor of Medicine  Pulmonary, Allergy, Critical Care and Sleep Medicine      Interval History:     Michele denies any significant cough or sputum production.  He does not usually have any chest congestion.  He denies any shortness of breath.  He is doing 1-2 nebulized therapies per day.         Review of Systems:     CONSTITUTIONAL: no fever, no chills, no sweats, intentional weight loss, no change in energy--good, no change in appetite--good    INTEGUMENTARY/SKIN: no rash, no obvious new lesions    ENT/MOUTH: no sore throat, no new sinus pain, no new nasal drainage, no new nasal congestion, no ear ringing     RESPIRATORY: see interval history    CV: no chest pain, no palpitations, no orthopnea, no PND    GI: no nausea, no vomiting, no change in stools, less fatty stools, no GERD    : negative    MUSCULOSKELETAL: working on core    ENDOCRINE: no excessive thirst, blood sugars with adequate control    NEURO:  No headache, no numbness, no tingling    SLEEP: restless leg    PSYCHIATRIC: mood same as always, better than most          Past Medical  and Surgical History:     Past Medical History:   Diagnosis Date     CAP (community acquired pneumonia)     2011     CF (cystic fibrosis) (H)     diagnosed 1969, malnutrition, staph empyema     Chronic knee pain      Diverticular disease 2018    see on colonoscopy     GERD (gastroesophageal reflux disease)     egd in the past     Pancreatic insufficiency      Past Surgical History:   Procedure Laterality Date     ENDOSCOPIC SINUS SURGERY      removal of mucocele behind eye, 1992     OPTICAL TRACKING SYSTEM ENDOSCOPIC SINUS SURGERY Bilateral 11/28/2016    Procedure: OPTICAL TRACKING SYSTEM ENDOSCOPIC SINUS SURGERY;  Surgeon: Harriett Cosby MD;  Location:  OR           Family History:     Family History   Problem Relation Age of Onset     Lipids Father      Cardiovascular Father      Diabetes Maternal Grandmother         type 2            Social History:     Social History     Socioeconomic History     Marital status:      Spouse name: Not on file     Number of children: 1     Years of education: Not on file     Highest education level: Not on file   Occupational History     Occupation:    Social Needs     Financial resource strain: Not on file     Food insecurity     Worry: Not on file     Inability: Not on file     Transportation needs     Medical: Not on file     Non-medical: Not on file   Tobacco Use     Smoking status: Never Smoker     Smokeless tobacco: Never Used   Substance and Sexual Activity     Alcohol use: No     Alcohol/week: 0.0 standard drinks     Drug use: No     Sexual activity: Yes     Partners: Female     Birth control/protection: None   Lifestyle     Physical activity     Days per week: Not on file     Minutes per session: Not on file     Stress: Not on file   Relationships     Social connections     Talks on phone: Not on file     Gets together: Not on file     Attends Jainism service: Not on file     Active member of club or organization: Not on file     Attends meetings of  clubs or organizations: Not on file     Relationship status: Not on file     Intimate partner violence     Fear of current or ex partner: Not on file     Emotionally abused: Not on file     Physically abused: Not on file     Forced sexual activity: Not on file   Other Topics Concern     Parent/sibling w/ CABG, MI or angioplasty before 65F 55M? Not Asked   Social History Narrative    8/18/2020 --Lives in [place] with his wife and 15 yo son (Raghu).  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            Medications:     Current Outpatient Medications   Medication     albuterol (PROAIR HFA/PROVENTIL HFA/VENTOLIN HFA) 108 (90 Base) MCG/ACT inhaler     amylase-lipase-protease (ZENPEP) 86983-67467 units CPEP     azithromycin (ZITHROMAX) 500 MG tablet     aztreonam lysine (CAYSTON) 75 MG SOLR     Cholecalciferol (VITAMIN D) 2000 UNITS CAPS     hydrochlorothiazide (MICROZIDE) 12.5 MG capsule     multivitamin CF formula (AQUADEKS) chewable tablet     pantoprazole (PROTONIX) 40 MG EC tablet     EDU ALTERA NEBULIZER SYSTEM [facility]     Respiratory Therapy Supplies (EDU ALTERA NEBULIZER HANDSET) [facility]     Respiratory Therapy Supplies (EDU ALTERA NEBULIZER HANDSET) MISC     sodium chloride inhalant 7 % NEBU neb solution     TRIKAFTA 100-50-75 & 150 MG tablet pack     No current facility-administered medications for this visit.             Physical Exam:   There were no vitals taken for this visit.    GENERAL: Healthy, alert and no distress  EYES: Eyes grossly normal to inspection.  No discharge or erythema, or obvious scleral/conjunctival abnormalities.  RESP: No audible wheeze, cough, or visible cyanosis.  No visible retractions or increased work of breathing.    SKIN: Visible skin clear. No significant rash, abnormal pigmentation or lesions.  NEURO: Cranial nerves grossly intact.  Mentation and speech appropriate for age.  PSYCH: Mentation appears normal, affect  normal/bright, judgement and insight intact, normal speech and appearance well-groomed.         Data:   All laboratory and imaging data reviewed.    Cystic Fibrosis Culture  Specimen Description   Date Value Ref Range Status   02/09/2018 Sputum  Final   12/22/2017 Sputum  Final   01/12/2016 Sputum  Final   01/12/2016 Sputum  Final   01/12/2016 Sputum  Final    Culture Micro   Date Value Ref Range Status   02/09/2018 Moderate growth  Normal tessa    Final   02/09/2018 (A)  Final    Moderate growth  Pseudomonas aeruginosa, mucoid strain     02/09/2018 Moderate growth  Pseudomonas aeruginosa   (A)  Final   02/09/2018 Light growth  Staphylococcus aureus   (A)  Final        No results found for this or any previous visit (from the past 168 hour(s)).    PFT: Not performed.    Pulmonary exacerbation: absent    50 minutes spent on the date of the encounter doing chart review, history, documentation and further activities as noted above    Video-Visit Details    Type of service:  Video Visit    Video Start Time:  8:59    Video End Time: 9:40    Originating Location (pt. Location): [place]    Distant Location (provider location):  [facility] FOR LUNG SCIENCE [facility]     Platform used for Video Visit: AmWell        Again, thank you for allowing me to participate in the care of your patient.        Sincerely,        Siobhan Munoz MD

## 2021-01-06 NOTE — PROGRESS NOTES
Michele Altamirano is a 55 year old male who is being evaluated via a billable video visit.      How would you like to obtain your AVS? MyChart  If the video visit is dropped, the invitation should be resent by: Other e-mail: [email or web address]  Will anyone else be joining your video visit? No    [facility]  January 6, 2021         Assessment and Plan:   Michele Altamirano is a 55 year old male with cystic fibrosis.    1. 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  He was able to go an extended period of time without any nebs but did not feel after several days that he could appreciate some chest congestion.  Michele historically has grown out Pseudomonas in his sputum.  He has consistently shown stable pulmonary function.  At this time I have recommended to Michele:  --He should continue to do his nebulized therapy 1-2 times daily  --Michele should continue his [place] on 2 weeks off 2 weeks    2. Pancreatic Insufficiency/GI: Michele has no new symptoms consistent with worsening malabsorption.  Michele does appreciate that there is less fat in his stool compared to pre-Trikafta.  He has worked on some intentional weight loss because of weight gain that resulted from being on Trikafta.  He is restricting some of his dietary intake and doing more exercise.  I have asked Alondra our dietitian to contact Michele regarding his vitamin levels which were obtained in November.  - continue the present dose of pancreatic enzymes  - continue vitamin supplementation.    3.  Abnormal glucose tolerance: Michele reports that he is very attentive to his hemoglobin A1c.  He reports he is never recorded a blood sugar over 240.  He is aware that he should be cautious related to the possibilities of becoming diabetic.    4.  CF TR modulator therapy: Michele is on Trikafta and tolerating well.  He has had stable hepatic panel.  I have asked him to have repeat  labs done at the [facility] sometime about a month from now.    5.  Restless leg syndrome: Michele continues to struggle with sleep largely due to restless leg syndrome.  I have referred him to our sleep clinic for further evaluation.    6.  Psychosocial: Michele continues to work full-time.  He is spending 2 days at home with work.  Reports work is very busy and interesting.  He reports also that his family is doing well.  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    Annual studies due: UTD  Immunizations: UTD  Colonoscopy: review    Siobhan Munoz MD MPH  Associate Professor of Medicine  Pulmonary, Allergy, Critical Care and Sleep Medicine      Interval History:     Michele denies any significant cough or sputum production.  He does not usually have any chest congestion.  He denies any shortness of breath.  He is doing 1-2 nebulized therapies per day.         Review of Systems:     CONSTITUTIONAL: no fever, no chills, no sweats, intentional weight loss, no change in energy--good, no change in appetite--good    INTEGUMENTARY/SKIN: no rash, no obvious new lesions    ENT/MOUTH: no sore throat, no new sinus pain, no new nasal drainage, no new nasal congestion, no ear ringing     RESPIRATORY: see interval history    CV: no chest pain, no palpitations, no orthopnea, no PND    GI: no nausea, no vomiting, no change in stools, less fatty stools, no GERD    : negative    MUSCULOSKELETAL: working on core    ENDOCRINE: no excessive thirst, blood sugars with adequate control    NEURO:  No headache, no numbness, no tingling    SLEEP: restless leg    PSYCHIATRIC: mood same as always, better than most          Past Medical and Surgical History:     Past Medical History:   Diagnosis Date     CAP (community acquired pneumonia)     2011     CF (cystic fibrosis) (H)     diagnosed 1969, malnutrition, staph empyema     Chronic knee pain      Diverticular disease 2018    see on colonoscopy      GERD (gastroesophageal reflux disease)     egd in the past     Pancreatic insufficiency      Past Surgical History:   Procedure Laterality Date     ENDOSCOPIC SINUS SURGERY      removal of mucocele behind eye, 1992     OPTICAL TRACKING SYSTEM ENDOSCOPIC SINUS SURGERY Bilateral 11/28/2016    Procedure: OPTICAL TRACKING SYSTEM ENDOSCOPIC SINUS SURGERY;  Surgeon: Harriett Cosby MD;  Location:  OR           Family History:     Family History   Problem Relation Age of Onset     Lipids Father      Cardiovascular Father      Diabetes Maternal Grandmother         type 2            Social History:     Social History     Socioeconomic History     Marital status:      Spouse name: Not on file     Number of children: 1     Years of education: Not on file     Highest education level: Not on file   Occupational History     Occupation:    Social Needs     Financial resource strain: Not on file     Food insecurity     Worry: Not on file     Inability: Not on file     Transportation needs     Medical: Not on file     Non-medical: Not on file   Tobacco Use     Smoking status: Never Smoker     Smokeless tobacco: Never Used   Substance and Sexual Activity     Alcohol use: No     Alcohol/week: 0.0 standard drinks     Drug use: No     Sexual activity: Yes     Partners: Female     Birth control/protection: None   Lifestyle     Physical activity     Days per week: Not on file     Minutes per session: Not on file     Stress: Not on file   Relationships     Social connections     Talks on phone: Not on file     Gets together: Not on file     Attends [employer] service: Not on file     Active member of club or organization: Not on file     Attends meetings of clubs or organizations: Not on file     Relationship status: Not on file     Intimate partner violence     Fear of current or ex partner: Not on file     Emotionally abused: Not on file     Physically abused: Not on file     Forced sexual activity: Not on file   Other  Topics Concern     Parent/sibling w/ CABG, MI or angioplasty before 65F 55M? Not Asked   Social History Narrative    8/18/2020 --Lives in [place] with his wife and 15 yo son (Raghu).  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            Medications:     Current Outpatient Medications   Medication     albuterol (PROAIR HFA/PROVENTIL HFA/VENTOLIN HFA) 108 (90 Base) MCG/ACT inhaler     amylase-lipase-protease (ZENPEP) 80417-79864 units CPEP     azithromycin (ZITHROMAX) 500 MG tablet     aztreonam lysine (CAYSTON) 75 MG SOLR     Cholecalciferol (VITAMIN D) 2000 UNITS CAPS     hydrochlorothiazide (MICROZIDE) 12.5 MG capsule     multivitamin CF formula (AQUADEKS) chewable tablet     pantoprazole (PROTONIX) 40 MG EC tablet     EDU ALTERA NEBULIZER SYSTEM [facility]     Respiratory Therapy Supplies (EDU ALTERA NEBULIZER HANDSET) [facility]     Respiratory Therapy Supplies (EDU ALTERA NEBULIZER HANDSET) MISC     sodium chloride inhalant 7 % NEBU neb solution     TRIKAFTA 100-50-75 & 150 MG tablet pack     No current facility-administered medications for this visit.             Physical Exam:   There were no vitals taken for this visit.    GENERAL: Healthy, alert and no distress  EYES: Eyes grossly normal to inspection.  No discharge or erythema, or obvious scleral/conjunctival abnormalities.  RESP: No audible wheeze, cough, or visible cyanosis.  No visible retractions or increased work of breathing.    SKIN: Visible skin clear. No significant rash, abnormal pigmentation or lesions.  NEURO: Cranial nerves grossly intact.  Mentation and speech appropriate for age.  PSYCH: Mentation appears normal, affect normal/bright, judgement and insight intact, normal speech and appearance well-groomed.         Data:   All laboratory and imaging data reviewed.    Cystic Fibrosis Culture  Specimen Description   Date Value Ref Range Status   02/09/2018 Sputum  Final   12/22/2017 Sputum   Final   01/12/2016 Sputum  Final   01/12/2016 Sputum  Final   01/12/2016 Sputum  Final    Culture Micro   Date Value Ref Range Status   02/09/2018 Moderate growth  Normal tessa    Final   02/09/2018 (A)  Final    Moderate growth  Pseudomonas aeruginosa, mucoid strain     02/09/2018 Moderate growth  Pseudomonas aeruginosa   (A)  Final   02/09/2018 Light growth  Staphylococcus aureus   (A)  Final        No results found for this or any previous visit (from the past 168 hour(s)).    PFT: Not performed.    Pulmonary exacerbation: absent    50 minutes spent on the date of the encounter doing chart review, history, documentation and further activities as noted above    Video-Visit Details    Type of service:  Video Visit    Video Start Time:  8:59    Video End Time: 9:40    Originating Location (pt. Location): Home    Distant Location (provider location):  [facility]     Platform used for Video Visit: [employer]

## 2021-02-23 DIAGNOSIS — E84.9 CF (CYSTIC FIBROSIS) (H): ICD-10-CM

## 2021-02-23 RX ORDER — ELEXACAFTOR, TEZACAFTOR, AND IVACAFTOR 100-50-75
KIT ORAL
Qty: 84 EACH | Refills: 2 | OUTPATIENT
Start: 2021-02-23

## 2021-02-23 RX ORDER — ELEXACAFTOR, TEZACAFTOR, AND IVACAFTOR 100-50-75
KIT ORAL
Qty: 84 EACH | Refills: 0 | Status: SHIPPED | OUTPATIENT
Start: 2021-02-23 | End: 2021-03-02

## 2021-03-02 ENCOUNTER — CLINICAL UPDATE (OUTPATIENT)
Dept: PHARMACY | Facility: CLINIC | Age: 56
End: 2021-03-02
Payer: COMMERCIAL

## 2021-03-02 DIAGNOSIS — E84.9 CYSTIC FIBROSIS (H): Primary | ICD-10-CM

## 2021-03-02 DIAGNOSIS — E84.9 CF (CYSTIC FIBROSIS) (H): ICD-10-CM

## 2021-03-02 LAB
ALBUMIN SERPL-MCNC: 4.4 G/DL
ALP SERPL-CCNC: 98 U/L
ALT SERPL-CCNC: 50 U/L
AST SERPL-CCNC: 44 U/L
BILIRUB SERPL-MCNC: 0.8 MG/DL
BILIRUBIN DIRECT: 0.2 MG/DL
CK TOTAL: 170 U/L
PROT SERPL-MCNC: 7.2 G/DL

## 2021-03-02 PROCEDURE — 99207 PR NO CHARGE LOS: CPT | Performed by: PHARMACIST

## 2021-03-02 RX ORDER — ELEXACAFTOR, TEZACAFTOR, AND IVACAFTOR 100-50-75
KIT ORAL
Qty: 84 EACH | Refills: 3 | Status: SHIPPED | OUTPATIENT
Start: 2021-03-02 | End: 2021-07-07

## 2021-03-02 NOTE — PROGRESS NOTES
Clinical Update:                                                    At the request of Dr. Munoz, a chart review was conducted for Michele Altamirano.    Reason for Chart Review: Trikafta Quarterly Lab Monitoring 3/4    Discussion: Michele has been on Trikafta since 7/2020.    Labs were reviewed from 3/1/21 at [facility] (entered into [employer]). All labs were WNL as they were previously.    Lab Results   Component Value Date    ALT 50 03/01/2021    AST 44 03/01/2021    BILITOTAL 0.8 03/01/2021    DBIL 0.2 03/01/2021    CKTOTAL 170 03/01/2021       Plan:  1. Continue Trikafta (Michele notified via [employer])  2. Recheck hepatic panel and CK in 3 months (no appt currently scheduled)  3. Pharmacist to authorize refill    Liset Cee, PharmD  Cystic Fibrosis MTM Pharmacist  [facility]  Voicemail: [phone]

## 2021-03-02 NOTE — Clinical Note
Michele's labs look great! We will plan to recheck his last quarterly labs in June/July. He does not currently have an appt scheduled but hopefully we can align those if possible.    Thanks,    Liset

## 2021-03-10 ENCOUNTER — IMMUNIZATION (OUTPATIENT)
Dept: NURSING | Facility: CLINIC | Age: 56
End: 2021-03-10
Payer: COMMERCIAL

## 2021-03-10 PROCEDURE — 91303 PR COVID VAC JANSSEN AD26 0.5ML: CPT

## 2021-03-10 PROCEDURE — 0031A PR COVID VAC JANSSEN AD26 0.5ML: CPT

## 2021-06-25 ENCOUNTER — HOSPITAL ENCOUNTER (OUTPATIENT)
Dept: RESEARCH | Facility: CLINIC | Age: 56
End: 2021-06-25
Attending: INTERNAL MEDICINE
Payer: COMMERCIAL

## 2021-06-25 VITALS
TEMPERATURE: 97.5 F | SYSTOLIC BLOOD PRESSURE: 126 MMHG | HEIGHT: 69 IN | BODY MASS INDEX: 24.33 KG/M2 | HEART RATE: 57 BPM | WEIGHT: 164.24 LBS | DIASTOLIC BLOOD PRESSURE: 77 MMHG | RESPIRATION RATE: 18 BRPM

## 2021-06-25 DIAGNOSIS — Z00.6 EXAMINATION OF PARTICIPANT OR CONTROL IN CLINICAL RESEARCH: ICD-10-CM

## 2021-06-25 PROCEDURE — 510N000016 HC RESEARCH MEALS, PER MEAL

## 2021-06-25 PROCEDURE — 510N000018 HC RESEARCH OGTT, PER HOUR

## 2021-06-25 PROCEDURE — 510N000009 HC RESEARCH FACILITY, PER 15 MIN

## 2021-06-25 PROCEDURE — 250N000009 HC RX 250: Performed by: INTERNAL MEDICINE

## 2021-06-25 PROCEDURE — 510N000017 HC CRU PATIENT CARE, PER 15 MIN

## 2021-06-25 PROCEDURE — 300N000003 HC RESEARCH SPECIMEN PROCESSING, SIMPLE

## 2021-06-25 PROCEDURE — 300N000004 HC RESEARCH SPECIMEN PROCESSING, MODERATE

## 2021-06-25 RX ADMIN — ALCOHOL 75 G: 65 GEL TOPICAL at 09:32

## 2021-06-25 ASSESSMENT — MIFFLIN-ST. JEOR: SCORE: 1573.76

## 2021-06-25 NOTE — ADDENDUM NOTE
Encounter addended by: Donald Sprague on: 6/25/2021 1:55 PM   Actions taken: Charge Capture section accepted

## 2021-07-07 DIAGNOSIS — E84.9 CF (CYSTIC FIBROSIS) (H): ICD-10-CM

## 2021-07-07 RX ORDER — ELEXACAFTOR, TEZACAFTOR, AND IVACAFTOR 100-50-75
KIT ORAL
Qty: 84 EACH | Refills: 0 | Status: SHIPPED | OUTPATIENT
Start: 2021-07-07 | End: 2021-07-30

## 2021-07-08 DIAGNOSIS — E84.9 CF (CYSTIC FIBROSIS) (H): ICD-10-CM

## 2021-07-08 RX ORDER — NEBULIZER
75 EACH MISCELLANEOUS 3 TIMES DAILY
Qty: 1 EACH | Refills: 6 | Status: SHIPPED | OUTPATIENT
Start: 2021-07-08 | End: 2024-04-09

## 2021-07-08 RX ORDER — AZTREONAM 75 MG/ML
KIT INHALATION
Qty: 84 ML | Refills: 6 | Status: SHIPPED | OUTPATIENT
Start: 2021-07-08 | End: 2022-03-25

## 2021-07-16 ENCOUNTER — TRANSFERRED RECORDS (OUTPATIENT)
Dept: HEALTH INFORMATION MANAGEMENT | Facility: CLINIC | Age: 56
End: 2021-07-16

## 2021-07-20 NOTE — PATIENT INSTRUCTIONS
Cystic Fibrosis Self-Care Plan    RECOMMENDATIONS:   Michele, It was great to see you today.  The plan from today:  --labs today  --Dariusz will call about sweat test study  --pharmacy to review meds and renew  --no more sugar cereal  Great job with self cares!    YOUR GOAL:  Enjoy the rest of summer!      [facility] Nurse line:  Jodi Corral [phone]     [facility] Fax Number:      [phone]         Cystic Fibrosis Respiratory Therapists:   Alyse Cheek              [phone]          Consuelo Bonilla   [phone]  Cystic Fibrosis Dietitians:              Willa Romero              [phone]                            Alondra Vitale                        [phone]   Cystic Fibrosis Diabetes Nurse:    Livia Mondragon   [phone]    Cystic Fibrosis Social Workers:     Kaykay Vieyra               [phone]                     Rayna Díaz               [phone]  Cystic Fibrosis Pharmacists:           Jael Arvizu                               [phone]         Ameena Guerrero   [phone]  Cystic Fibrosis Genetic Counselor:   Charisse Reveles    [phone]    [facility] website:  [email or web address]    COVID VACCINES:    You are eligible for the COVID-19 vaccine. Sign up for your COVID vaccine via [employer]. Log in, select the menu bar, select schedule an appointment, and then select COVID-19 Vaccine 1st Dose. You may also schedule by calling this number [phone] however hold times can be long.       OR schedule through the [place] Department of Health Vaccine Connector at https://vaccineconnector.[place].gov/ or by calling [phone].      The best vaccine is the one that s available to you first.  All COVID-19 vaccines currently available in the [place] (Sam & Sam, Pfizer and Moderna) have been shown to be highly effect at preventing COVID-19.       We re still learning how vaccines will affect the spread of  COVID-19. After you ve been fully vaccinated against COVID-19, you should keep taking precautions in public places like wearing a mask, staying 6 feet apart from others, and avoiding crowds and poorly ventilated spaces until we know more.    People are considered fully vaccinated:  2 weeks after their second dose in a 2-dose series, such as the Pfizer or Moderna vaccines, or  2 weeks after a single-dose vaccine, such as Sam & Sam s Delaney vaccine    If you ve been fully vaccinated:  You can gather indoors with fully vaccinated people without wearing a mask.  You can gather indoors with unvaccinated people from one other household (for example, visiting with relatives who all live together) without masks, unless any of those people or anyone they live with has an increased risk for severe illness from COVID-19.  If you ve been around someone who has COVID-19, you do not need to stay away from others or get tested unless you have symptoms.  However, if you live in a group setting (like a correctional or half-way facility or group home) and are around someone who has COVID-19, you should still stay away from others for 14 days and get tested, even if you don t have symptoms.         MRN: [id number]   Clinic Date: July 20, 2021   Patient: Michele Altamirano     Annual Studies:   IGG   Date Value Ref Range Status   02/09/2018 883 695 - 1,620 mg/dL Final     Insulin   Date Value Ref Range Status   02/09/2018 36.2 (H) 3 - 25 mU/L Final     Comment:     Starting 7/13/2017, insulin results will decrease by approximately 37%   compared to insulin results reported in EPIC between (12/16/2016-7/12/2017),   and should be interpreted accordingly. Insulin results reported prior to   12/16/16 are comparable to current insulin results and therefore no adjustment   is needed.       There are no preventive care reminders to display for this patient.    Pulmonary Function Tests  FEV1: amount of air you can blow out in 1 second  FVC:  total amount of air you can take in and blow out    Your Goals:         PFT Latest Ref Rng & Units 4/17/2019   FVC L 3.91   FEV1 L 2.55   FVC% % 82   FEV1% % 68          Airway Clearance: The Most Important Way to Keep Your Lungs Healthy  Vest Settings:    Hill-Rom Frequencies: 8, 9, 10 Pressure 10 Then, Frequencies 18, 19, 20 Pressure 6      RespirTech: Quick Start with Pressure of     Do each frequency for 5 minutes; Deflate vest after each frequency & cough 3 times before beginning the next setting.    Vest and Neb Therapy should be done 1 times/day.    Good Nutrition Can Improve Lung Function and Overall Health     Take ALL of your vitamins with food     Take 1/2 of your enzymes before EVERY meal/snack and the other 1/2 mid-meal/snack    Wt Readings from Last 3 Encounters:   06/25/21 74.5 kg (164 lb 3.9 oz)   04/17/19 73.5 kg (162 lb)   10/24/18 71 kg (156 lb 8.4 oz)       There is no height or weight on file to calculate BMI.         National CF Foundation Recommendations for BMI in CF Adults: Women: at least 22 Men: at least 23        Controlling Blood Sugars Helps Prevent Lung Infections & Improves Nutrition  Test blood sugar:     In the morning before eating (goal is )     2 hours after a meal (goal is less than 150)     When pre-meal glucose is greater than 150 add correction     At bedtime (if less than 100 eat a snack with 15 grams of carbohydrates  Last A1C Results:   Hemoglobin A1C   Date Value Ref Range Status   11/03/2020 6.1 (A) 0 - 5.6 % Final         If diabetic, measure A1C every 6 months. Goal: Under 7%    Staying Healthy    Research:  If you are interested in learning about research opportunities or have questions, please contact the CF Research Team at [phone] or [email or web address].      CF Foundation:  Compass is a personalized resource service to help you with the insurance, financial, legal and other issues you are facing.  It's free, confidential and available to anyone with  CF.  Ask your  for more information or contact Compass directly at 487-COMPASS (626-2755) or compass@cff.org, or learn more at cff.org/compass.

## 2021-07-21 ENCOUNTER — ALLIED HEALTH/NURSE VISIT (OUTPATIENT)
Dept: CARE COORDINATION | Facility: CLINIC | Age: 56
End: 2021-07-21

## 2021-07-21 ENCOUNTER — OFFICE VISIT (OUTPATIENT)
Dept: PULMONOLOGY | Facility: CLINIC | Age: 56
End: 2021-07-21
Attending: INTERNAL MEDICINE
Payer: COMMERCIAL

## 2021-07-21 VITALS
BODY MASS INDEX: 24.49 KG/M2 | WEIGHT: 165.34 LBS | HEIGHT: 69 IN | RESPIRATION RATE: 17 BRPM | OXYGEN SATURATION: 97 % | HEART RATE: 72 BPM

## 2021-07-21 DIAGNOSIS — Z13.9 RISK AND FUNCTIONAL ASSESSMENT: Primary | ICD-10-CM

## 2021-07-21 DIAGNOSIS — E84.0 CYSTIC FIBROSIS WITH PULMONARY MANIFESTATIONS (H): Primary | ICD-10-CM

## 2021-07-21 LAB
EXPTIME-PRE: 11.25 SEC
FEF2575-%PRED-PRE: 48 %
FEF2575-PRE: 1.57 L/SEC
FEF2575-PRED: 3.26 L/SEC
FEFMAX-%PRED-PRE: 97 %
FEFMAX-PRE: 9.13 L/SEC
FEFMAX-PRED: 9.37 L/SEC
FEV1-%PRED-PRE: 74 %
FEV1-PRE: 2.73 L
FEV1FEV6-PRE: 72 %
FEV1FEV6-PRED: 80 %
FEV1FVC-PRE: 69 %
FEV1FVC-PRED: 79 %
FIFMAX-PRE: 7.84 L/SEC
FVC-%PRED-PRE: 84 %
FVC-PRE: 3.94 L
FVC-PRED: 4.68 L

## 2021-07-21 PROCEDURE — 87186 SC STD MICRODIL/AGAR DIL: CPT | Performed by: INTERNAL MEDICINE

## 2021-07-21 PROCEDURE — 94375 RESPIRATORY FLOW VOLUME LOOP: CPT | Performed by: INTERNAL MEDICINE

## 2021-07-21 PROCEDURE — G0463 HOSPITAL OUTPT CLINIC VISIT: HCPCS | Mod: 25

## 2021-07-21 PROCEDURE — 99215 OFFICE O/P EST HI 40 MIN: CPT | Mod: 25 | Performed by: INTERNAL MEDICINE

## 2021-07-21 ASSESSMENT — MIFFLIN-ST. JEOR: SCORE: 1578.79

## 2021-07-21 ASSESSMENT — PAIN SCALES - GENERAL: PAINLEVEL: NO PAIN (0)

## 2021-07-21 NOTE — PROGRESS NOTES
Adult Cystic Fibrosis Program  Annual Psychosocial Assessment    Presenting Information:  Michele is a 55-year-old male with cystic fibrosis who presents in CF clinic today for a routine appointment with primary CF provider, Dr. Munoz.  Met with Michele to complete an updated annual psychosocial assessment.  Michele was unaccompanied in clinic room during SW visit.     Living situation:  Michele lives with his wife Sherie and son Raghu in [place], [place].  They own their own home.  Michele and his family have one cat, Onur. He denies any concerns about his living situation.      Family Constellation:  Michele was raised by his biological parents and is the youngest of 4 children.  He has 3 brothers, all of whom are  with children. Michele noted previously that none of his brothers are CF-carriers. Michele's mom lives in [place], [place], where he grew up. Two of his brothers remain in [place], and the other lives in [place]. Michele shared that his dad passed away last month after a long cruz with Alzheimer's, he described the death as a blessing as his dad had been miserable for a long time.  He reports that his mom is also coping well with the loss and continues to do well physically as well.     Michele and Sherie met in college and have been  for 22 years. Michele and his wife have one 17-year-old son, Raghu, whom they adopted from [place] when he was 4 years old. They would have considered adopting another son from [place] but were unable to proceed due to bureaucratic issues. Sherie's parents live in [place].  He reports that Raghu is doing great, continues to be involved with sports (soccer and tennis), he will be a senior, is looking at colleges and working at [employer].    Social Support:  Michele reports good social support.  He gets along well with family members and draws additional support from friends and co-workers.  He has never had any connections in the CF Community.    Adjustment to Illness:  Michele was diagnosed with CF at  "age 4. He knows that he was very ill prior to his diagnosis but has no memory of that time. Once he was diagnosed and received therapy, his health stabilized, and he had very few complications. He recalls his parents being diligent with treatments but also not following some of the conventions through allowing him to be active in sports and not limiting fat in his diet. Michele noted that most people in high school had no idea he had CF. He has never been admitted to the hospital for CF. He had received care through the [facility] but transferred care to [facility] in 2011.   Michele describes his current health status as \"good\" and feels he has been able to maintain stability with his health. Michele takes Trikafta and feels this medication has been very helpful to him.  Michele has not done vest therapy in a very long time but does use his nebulizer and exercises regularly through riding his bike and various other outdoor activities.  Since Michele has remained very stable, he believes that his current regimen works well for him.  He denies any physical health symptoms that interfere with daily activities.   Michele is very private about his CF diagnosis, his wife and very close, long-time friends are aware but he shared that his son does not know and his employer/co-workers do not know.  His son still does not know that Michele has CF.  His main reason for not telling his son is that he fears his son will look online and read information that will be misinterpreted such as the average life expectancy, non-curable, progressive, etc.  He also grapples with 'finding the right time' because he knows he needs to do it but he doesn't want it to interfere with his studies.  His son knows that he has some health issues because he sees him take medications, he just does not know specifics and doesn't really inquire because it's all he knows.    Health Care Directive:  Michele has previously received Health Care Directive education.  This SW " "reviewed education, including concept/purpose of health care directive, default health care agents and how to complete a directive.  Michele is comfortable with his default health care agent in absence of a directive (wife).  Michele and his wife have had conversations regarding Michele's health care preferences, hopes and wishes.      Education:  Michele completed two Bachelor's degrees in business and mechanical engineering.    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 He enjoys his job and reports a supportive work place, although they are not aware of CF diagnosis and he does not plan to tell them. He has worked there for 25 years.  He has gone into the office the entire time during the pandemic.  He normally travels about monthly for his job.  He has access to employer-based benefits, including health insurance, short and long-term disability, however he has insurance through Sherie's employer through [employer].  He hopes to retire in a few years and had questions about insurance after [facility].  He denies any employment concerns at this time.     Sherie is an  and works full-time for [employer].  She works from home full-time.     Finances:  Michele and Sherie receive income through wages and he denies any financial concerns.     Insurance:  Michele has [employer] insurance through Sherie's employer.  Today, Michele had questions about insurance options for when he retires as he plans to do this prior to age 66. Provided him with education the current options and also encouraged him to reach out to Compass through F.    Mental Health/Coping:  Michele denies any current or past symptoms indicative of mood, anxiety, eating, learning or other mental health disorder. He identifies himself as a \"pretty happy go henry hunter\".  He has never been under the care of a mental health provider.  He generally cheryl with stressors through enjoying " "time with his family, exercising and engaging in his hobbies. Michele did note he has difficulty sleeping but attributes this to staying up late and working on his hobbies.     Michele declined completing the PHQ-9/LUIS DANIEL-7 today indicating that it is \"non-applicable\" to him.      Chemical Health:  Michele denies tobacco or illicit drug use.  He occasionally drinks alcohol during social events at work because he feels like it's part of his job, however he usually limits this to one beer.      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  He also reports having many hobbies/interests such as photography, brewing beer and restoring old cars.       Intervention:  -Psychosocial Assessment  -Mental health screening   -Discussing talking to your children about CF diagnosis  -Insurance counseling    Assessment:  Michele appeared to be open in his responses. His psychosocial situation has remained largely the same since his last clinic visit.  Michele continues to report stability with his physical and mental health. Michele seems to be psychosocially stable overall, with access to relevant resources and supports.  Michele continues to think about and grapple with how he will tell his son about his CF diagnosis in the future and we discussed this together.  Recommend f/u as noted in the plan below.     Plan:  - Address psychosocial concerns as they may arise.   - Complete psychosocial assessment annually.    J CARLOS Hidalgo, [facility]  Adult Cystic Fibrosis   Ph: [phone]  Pager: [phone]             "

## 2021-07-21 NOTE — NURSING NOTE
Chief Complaint   Patient presents with     RECHECK     Return CF     Medications reviewed and vital signs taken.   Jas Lanier, HUY

## 2021-07-21 NOTE — LETTER
7/21/2021         RE: Michele Altamirano  [address] [address]        Dear Colleague,    Thank you for referring your patient, Michele Altamirano, to the [facility] [place]. Please see a copy of my visit note below.    [facility]  July 21, 2021         Assessment and Plan:   Michele Altamirano is a 55 year old male with cystic fibrosis.    1. CF lung disease with mild obstruction: Michele reports little in the way of pulmonary symptomatology.  He does remain consistent doing physical activity as well as nebulized therapy.  He is not historically done vest therapy.  Michele's past sputum cultures have shown evidence of Pseudomonas.  He does show evidence of stability in his pulmonary function today.  At this time I recommended to Michele:  --He should continue to do his nebulized therapy.  We talked about bronchiectasis and how it is unlikely to resolve even on Trikafta.  --He should continue to remain physically active doing walks and other sports.    2. Pancreatic Insufficiency/GI:  Michele has no new symptoms consistent with worsening malabsorption.    - continue the present dose of pancreatic enzymes  - continue vitamin supplementation.    3.  CF TR modulator therapy: Michele is on Trikafta and tolerating well.  The plan was to get labs performed today.  It appears that that did not happen.  We will have to get them done at his local clinic.    4.  Possible restless leg syndrome: Michele and I I discussed possible restless leg syndrome at his last visit.  We discussed being seen by sleep medicine.  He reports improvement in his symptoms so he did not pursue this appointment.  He knows to contact me if his symptoms were to worsen.  We also discussed obtaining an iron and ferritin level.    5.  Psychosocial: Michele reports that work is very busy.  He has had a busy summer with travel.  He went to the ED [facility] and [place].  His father  did pass away this summer as well.    Annual studies due: review with patient--needs to be done  Immunizations: UTD  Colonoscopy: review with patient    Siobhan Munoz MD MPH  Associate Professor of Medicine  Pulmonary, Allergy, Critical Care and Sleep Medicine      Interval History:     Michele reports that he is really no cough or sputum production.  He denies any chest congestion.  He does do hypertonic saline nebs 3-4 times per week.  He reports only rare chest congestion.         Review of Systems:     CONSTITUTIONAL: no fever, no chills, no sweats, increase in weight, no change in energy--really good, no change in appetite--too good    INTEGUMENTARY/SKIN: no rash    ENT/MOUTH: no sore throat, no new sinus pain, no new nasal drainage, no new nasal congestion, no ear ringing     RESPIRATORY: see interval history    CV: no chest pain, no palpitations, no peripheral edema    GI: no nausea, no vomiting, no change in stools, no fatty stools, no GERD    : negative    MUSCULOSKELETAL: hands sore with walks    ENDOCRINE: negative    NEURO:  No headache, no numbness, no tingling    SLEEP: no issues--better, did not see sleep clinic    PSYCHIATRIC: mood stable--great          Past Medical and Surgical History:     Past Medical History:   Diagnosis Date     CAP (community acquired pneumonia)     2011     CF (cystic fibrosis) (H)     diagnosed 1969, malnutrition, staph empyema     Chronic knee pain      Diverticular disease 2018    see on colonoscopy     GERD (gastroesophageal reflux disease)     egd in the past     Pancreatic insufficiency      Past Surgical History:   Procedure Laterality Date     ENDOSCOPIC SINUS SURGERY      removal of mucocele behind eye, 1992     OPTICAL TRACKING SYSTEM ENDOSCOPIC SINUS SURGERY Bilateral 11/28/2016    Procedure: OPTICAL TRACKING SYSTEM ENDOSCOPIC SINUS SURGERY;  Surgeon: Harriett Cosby MD;  Location:  OR           Family History:     Family History   Problem Relation Age of  Onset     Lipids Father      Cardiovascular Father      Diabetes Maternal Grandmother         type 2            Social History:     Social History     Socioeconomic History     Marital status:      Spouse name: Not on file     Number of children: 1     Years of education: Not on file     Highest education level: Not on file   Occupational History     Occupation:    Tobacco Use     Smoking status: Never Smoker     Smokeless tobacco: Never Used   Substance and Sexual Activity     Alcohol use: No     Alcohol/week: 0.0 standard drinks     Drug use: No     Sexual activity: Yes     Partners: Female     Birth control/protection: None   Other Topics Concern     Parent/sibling w/ CABG, MI or angioplasty before 65F 55M? Not Asked   Social History Narrative    8/18/2020 --Lives in [place] with his wife and 15 yo son (Raghu).  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     Social Determinants of Health     Financial Resource Strain:      Difficulty of Paying Living Expenses:    Food Insecurity:      Worried About Running Out of Food in the Last Year:      Ran Out of Food in the Last Year:    Transportation Needs:      Lack of Transportation (Medical):      Lack of Transportation (Non-Medical):    Physical Activity:      Days of Exercise per Week:      Minutes of Exercise per Session:    Stress:      Feeling of Stress :    Social Connections:      Frequency of Communication with Friends and Family:      Frequency of Social Gatherings with Friends and Family:      Attends Tenriism Services:      Active Member of Clubs or Organizations:      Attends Club or Organization Meetings:      Marital Status:    Intimate Partner Violence:      Fear of Current or Ex-Partner:      Emotionally Abused:      Physically Abused:      Sexually Abused:             Medications:     Current Outpatient Medications   Medication     albuterol (PROAIR HFA/PROVENTIL HFA/VENTOLIN  "HFA) 108 (90 Base) MCG/ACT inhaler     amylase-lipase-protease (ZENPEP) 64104-44184 units CPEP     azithromycin (ZITHROMAX) 500 MG tablet     CAYSTON 75 MG SOLR     Cholecalciferol (VITAMIN D) 2000 UNITS CAPS     hydrochlorothiazide (MICROZIDE) 12.5 MG capsule     multivitamin CF formula (AQUADEKS) chewable tablet     pantoprazole (PROTONIX) 40 MG EC tablet     EDU ALTERA NEBULIZER SYSTEM [facility]     Respiratory Therapy Supplies (EDU ALTERA NEBULIZER HANDSET) [facility]     Respiratory Therapy Supplies (EDU ALTERA NEBULIZER HANDSET) MISC     sodium chloride inhalant 7 % NEBU neb solution     TRIKAFTA 100-50-75 & 150 MG tablet pack     No current facility-administered medications for this visit.            Physical Exam:   Pulse 72   Resp 17   Ht 1.75 m (5' 8.9\")   Wt 75 kg (165 lb 5.5 oz)   SpO2 97%   BMI 24.49 kg/m      Constitutional:   Awake, alert and in no apparent distress     Eyes:   nonicteric     ENT:   Mask in place     Neck:   Supple without supraclavicular or cervical lymphadenopathy     Lungs:   Good air flow.  No crackles. No rhonchi.  No wheezes.     Cardiovascular:   Normal S1 and S2.  RRR.  No murmur, gallop or rub.     Abdomen:   NABS, soft, nontender, nondistended.      Musculoskeletal:   No edema, digital clubbing present     Neurologic:   Alert and conversant.     Skin:   Warm, dry.  No rash on limited exam.             Data:   All laboratory and imaging data reviewed.    Cystic Fibrosis Culture  Specimen Description   Date Value Ref Range Status   02/09/2018 Sputum  Final   12/22/2017 Sputum  Final   01/12/2016 Sputum  Final   01/12/2016 Sputum  Final   01/12/2016 Sputum  Final    Culture Micro   Date Value Ref Range Status   02/09/2018 Moderate growth  Normal tessa    Final   02/09/2018 (A)  Final    Moderate growth  Pseudomonas aeruginosa, mucoid strain     02/09/2018 Moderate growth  Pseudomonas aeruginosa   (A)  Final   02/09/2018 Light growth  Staphylococcus aureus   (A)  Final    "     Recent Results (from the past 168 hour(s))   General PFT Lab (Please always keep checked)    Collection Time: 07/21/21  8:06 AM   Result Value Ref Range    FVC-Pred 4.68 L    FVC-Pre 3.94 L    FVC-%Pred-Pre 84 %    FEV1-Pre 2.73 L    FEV1-%Pred-Pre 74 %    FEV1FVC-Pred 79 %    FEV1FVC-Pre 69 %    FEFMax-Pred 9.37 L/sec    FEFMax-Pre 9.13 L/sec    FEFMax-%Pred-Pre 97 %    FEF2575-Pred 3.26 L/sec    FEF2575-Pre 1.57 L/sec    XBS1918-%Pred-Pre 48 %    ExpTime-Pre 11.25 sec    FIFMax-Pre 7.84 L/sec    FEV1FEV6-Pred 80 %    FEV1FEV6-Pre 72 %   Cystic Fibrosis Culture Aerobic Bacterial    Collection Time: 07/21/21  8:33 AM    Specimen: Throat; Swab   Result Value Ref Range    Culture Culture in progress     Culture 2+ Normal tessa     Culture 1+ Gram negative bacilli (A)        PFT: Mild obstructive lung disease.  When compared to 4/17/2019, the FEV1 has increased.      Pulmonary exacerbation: absent    45 minutes spent on the date of the encounter doing chart review, history and exam, documentation and further activities per the note        Again, thank you for allowing me to participate in the care of your patient.        Sincerely,        Siobhan Munoz MD

## 2021-07-26 ENCOUNTER — TELEPHONE (OUTPATIENT)
Dept: PULMONOLOGY | Facility: CLINIC | Age: 56
End: 2021-07-26

## 2021-07-26 DIAGNOSIS — K86.89 PANCREATIC INSUFFICIENCY: ICD-10-CM

## 2021-07-26 DIAGNOSIS — E84.0 CYSTIC FIBROSIS WITH PULMONARY MANIFESTATIONS (H): ICD-10-CM

## 2021-07-26 DIAGNOSIS — K21.9 GASTROESOPHAGEAL REFLUX DISEASE WITHOUT ESOPHAGITIS: ICD-10-CM

## 2021-07-26 DIAGNOSIS — E55.9 VITAMIN D DEFICIENCY: ICD-10-CM

## 2021-07-26 LAB
BACTERIA SPEC CULT: ABNORMAL

## 2021-07-26 RX ORDER — PANTOPRAZOLE SODIUM 40 MG/1
TABLET, DELAYED RELEASE ORAL
Qty: 180 TABLET | Refills: 3 | Status: SHIPPED | OUTPATIENT
Start: 2021-07-26 | End: 2022-09-19

## 2021-07-26 NOTE — TELEPHONE ENCOUNTER
Discussed with patient via [employer]pt    ----- Message -----  From: Siobhan Munoz MD  Sent: 7/26/2021   7:06 AM CDT  To:  Nurses-  Subject: serratia                                         Good morning.  I would like to treat the serratia since he has not grown it since 2015.  I believe that Michele has joint and tendon problems, so bactrim may be a better choice than levofloxacin for him.  Bactrim DS one tablet twice daily for 10 days.  Be careful of the sun.    Thank you, Siobhan

## 2021-07-30 DIAGNOSIS — E84.9 CF (CYSTIC FIBROSIS) (H): ICD-10-CM

## 2021-07-30 RX ORDER — ELEXACAFTOR, TEZACAFTOR, AND IVACAFTOR 100-50-75
KIT ORAL
Qty: 84 EACH | Refills: 0 | Status: SHIPPED | OUTPATIENT
Start: 2021-07-30 | End: 2021-08-30

## 2021-08-06 ENCOUNTER — TELEPHONE (OUTPATIENT)
Dept: PULMONOLOGY | Facility: CLINIC | Age: 56
End: 2021-08-06

## 2021-08-06 NOTE — TELEPHONE ENCOUNTER
Prior Authorization Approval    Authorization Effective Date: 8/4/2021  Authorization Expiration Date: 8/4/2022  Medication: Trikafta  Approved Dose/Quantity: 84  Reference #:     Insurance Company: [employer] [phone] Fax [phone]  Expected CoPay:       CoPay Card Available:      Foundation Assistance Needed:    Which Pharmacy is filling the prescription (Not needed for infusion/clinic administered): [facility] - [place], [place] - [address]  Pharmacy Notified: Yes  Patient Notified: Yes

## 2021-08-24 ENCOUNTER — CLINICAL UPDATE (OUTPATIENT)
Dept: PHARMACY | Facility: CLINIC | Age: 56
End: 2021-08-24
Payer: COMMERCIAL

## 2021-08-24 DIAGNOSIS — E84.9 CYSTIC FIBROSIS (H): Primary | ICD-10-CM

## 2021-08-24 LAB
ALBUMIN (EXTERNAL): 4.4 G/DL (ref 3.7–5.2)
ALKALINE PHOSPHATASE (EXTERNAL): 85 U/L (ref 53–128)
ALT SERPL-CCNC: 40 U/L
AST SERPL-CCNC: 35 U/L (ref 17–59)
BILIRUB SERPL-MCNC: 1 MG/DL (ref 0–1.5)
BILIRUBIN DIRECT (EXTERNAL): 0.1 MG/DL (ref 0–0.3)
CK SERPL-CCNC: 98 U/L
PROTEIN TOTAL (EXTERNAL): 6.9 G/DL (ref 6.4–8.3)

## 2021-08-24 PROCEDURE — 99207 PR NO CHARGE LOS: CPT | Performed by: PHARMACIST

## 2021-08-24 NOTE — PROGRESS NOTES
Clinical Update:                                                    A chart review was conducted for Michele Altamirano.    Reason for Chart Review: Trikafta quarterly lab monitoring 4/4    Discussion: Michele has been on Trikafta since 7/20/20.    Labs were reviewed from 8/23/2021 at [facility].  All labs were within normal limits.    Lab Results   Component Value Date    08284 40 08/23/2021    55596 35 08/23/2021    98064 0.1 08/23/2021    95143 1.0 08/23/2021    CKT 98 08/23/2021         Plan:  1.  Continue Trikafta  2.  Recheck hepatic panel and CK in 6 months    Liset Cee, Sugar  Cystic Fibrosis MTM Pharmacist  [facility]  Voicemail: [phone]

## 2021-08-24 NOTE — PATIENT INSTRUCTIONS
1. Continue Trikafta  2. Repeat hepatic panel and CK in 6 months    Liset Cee PharmD  Cystic Fibrosis MTM Pharmacist  [facility]  Voicemail: [phone]

## 2021-08-30 DIAGNOSIS — E84.9 CF (CYSTIC FIBROSIS) (H): ICD-10-CM

## 2021-08-30 RX ORDER — ELEXACAFTOR, TEZACAFTOR, AND IVACAFTOR 100-50-75
KIT ORAL
Qty: 84 EACH | Refills: 5 | Status: SHIPPED | OUTPATIENT
Start: 2021-08-30 | End: 2022-01-31

## 2021-09-26 ENCOUNTER — HEALTH MAINTENANCE LETTER (OUTPATIENT)
Age: 56
End: 2021-09-26

## 2021-12-02 DIAGNOSIS — E84.9 CYSTIC FIBROSIS (H): ICD-10-CM

## 2021-12-02 RX ORDER — PANCRELIPASE LIPASE, PANCRELIPASE PROTEASE, PANCRELIPASE AMYLASE 20000; 63000; 84000 [USP'U]/1; [USP'U]/1; [USP'U]/1
CAPSULE, DELAYED RELEASE ORAL
Qty: 2900 CAPSULE | Refills: 3 | Status: SHIPPED | OUTPATIENT
Start: 2021-12-02 | End: 2022-11-21

## 2021-12-08 DIAGNOSIS — E55.9 VITAMIN D DEFICIENCY: ICD-10-CM

## 2021-12-08 DIAGNOSIS — E84.0 CYSTIC FIBROSIS WITH PULMONARY MANIFESTATIONS (H): ICD-10-CM

## 2021-12-08 DIAGNOSIS — K86.89 PANCREATIC INSUFFICIENCY: ICD-10-CM

## 2021-12-08 DIAGNOSIS — K21.9 GASTROESOPHAGEAL REFLUX DISEASE WITHOUT ESOPHAGITIS: ICD-10-CM

## 2021-12-08 RX ORDER — AZITHROMYCIN 500 MG/1
TABLET, FILM COATED ORAL
Qty: 39 TABLET | Refills: 3 | Status: SHIPPED | OUTPATIENT
Start: 2021-12-08 | End: 2022-11-30

## 2022-01-15 ENCOUNTER — HEALTH MAINTENANCE LETTER (OUTPATIENT)
Age: 57
End: 2022-01-15

## 2022-01-28 DIAGNOSIS — E84.9 CF (CYSTIC FIBROSIS) (H): ICD-10-CM

## 2022-01-28 RX ORDER — ELEXACAFTOR, TEZACAFTOR, AND IVACAFTOR 100-50-75
KIT ORAL
Qty: 84 TABLET | Refills: 5 | OUTPATIENT
Start: 2022-01-28

## 2022-01-28 NOTE — TELEPHONE ENCOUNTER
Patient has not been seen since 7/2021. Patient needs to schedule an appointment, then alba refill will be issued. Thanks!    Liset Cee, PharmD  Cystic Fibrosis MTM Pharmacist  [facility]  Voicemail: [phone]

## 2022-01-31 DIAGNOSIS — E84.9 CF (CYSTIC FIBROSIS) (H): ICD-10-CM

## 2022-01-31 RX ORDER — ELEXACAFTOR, TEZACAFTOR, AND IVACAFTOR 100-50-75
KIT ORAL
Qty: 84 EACH | Refills: 0 | Status: SHIPPED | OUTPATIENT
Start: 2022-01-31 | End: 2022-02-22

## 2022-01-31 NOTE — TELEPHONE ENCOUNTER
Appt scheduled for 2/22. Ordered trikafta 1 month courtesy fill from new encounter.    Liset Cee PharmD  Cystic Fibrosis MTM Pharmacist  [facility]  Voicemail: [phone]

## 2022-02-10 ENCOUNTER — TELEPHONE (OUTPATIENT)
Dept: PULMONOLOGY | Facility: CLINIC | Age: 57
End: 2022-02-10

## 2022-02-10 NOTE — TELEPHONE ENCOUNTER
PA Initiation    Medication: [place] PA renewal   Insurance Company: [employer] - Phone [phone] Fax [phone]  Pharmacy Filling the Rx:    Filling Pharmacy Phone:    Filling Pharmacy Fax:    Start Date: 2/10/2022    Called [facility] ([phone]) and spoke to rep Cortes to initiate urgent PA renewal over the phone, no clinicals are needed pending case# [id number] with estimated turnaround time of 24 to 48 hours with determination being faxed to [phone]

## 2022-02-14 NOTE — TELEPHONE ENCOUNTER
Prior Authorization Approval    Authorization Effective Date: 2/10/2022  Authorization Expiration Date: 2/10/2023  Medication: [place] PA renewal approved  Approved Dose/Quantity: 75mg / 84 for 28 day supply   Reference #: phone [id number]   Insurance Company: [employer] - Phone [phone] Fax [phone]  Expected CoPay:       CoPay Card Available:      Foundation Assistance Needed:    Which Pharmacy is filling the prescription (Not needed for infusion/clinic administered): [facility] - [place]  Pharmacy Notified: Yes  Patient Notified: No

## 2022-02-21 DIAGNOSIS — E84.9 CF (CYSTIC FIBROSIS) (H): Primary | ICD-10-CM

## 2022-02-22 ENCOUNTER — OFFICE VISIT (OUTPATIENT)
Dept: PULMONOLOGY | Facility: CLINIC | Age: 57
End: 2022-02-22
Attending: INTERNAL MEDICINE
Payer: COMMERCIAL

## 2022-02-22 VITALS
SYSTOLIC BLOOD PRESSURE: 129 MMHG | BODY MASS INDEX: 25.44 KG/M2 | OXYGEN SATURATION: 97 % | WEIGHT: 171.74 LBS | HEIGHT: 69 IN | HEART RATE: 68 BPM | DIASTOLIC BLOOD PRESSURE: 84 MMHG | RESPIRATION RATE: 17 BRPM

## 2022-02-22 DIAGNOSIS — E84.9 CF (CYSTIC FIBROSIS) (H): ICD-10-CM

## 2022-02-22 DIAGNOSIS — E84.9 CYSTIC FIBROSIS (H): Primary | ICD-10-CM

## 2022-02-22 DIAGNOSIS — E84.0 CYSTIC FIBROSIS WITH PULMONARY MANIFESTATIONS (H): Primary | ICD-10-CM

## 2022-02-22 PROCEDURE — G0463 HOSPITAL OUTPT CLINIC VISIT: HCPCS | Mod: 25

## 2022-02-22 PROCEDURE — 87077 CULTURE AEROBIC IDENTIFY: CPT | Performed by: INTERNAL MEDICINE

## 2022-02-22 PROCEDURE — 94375 RESPIRATORY FLOW VOLUME LOOP: CPT | Performed by: INTERNAL MEDICINE

## 2022-02-22 PROCEDURE — 99214 OFFICE O/P EST MOD 30 MIN: CPT | Mod: 25 | Performed by: INTERNAL MEDICINE

## 2022-02-22 RX ORDER — ELEXACAFTOR, TEZACAFTOR, AND IVACAFTOR 100-50-75
KIT ORAL
Qty: 84 EACH | Refills: 0 | Status: SHIPPED | OUTPATIENT
Start: 2022-02-22 | End: 2022-03-25

## 2022-02-22 RX ORDER — NYSTATIN 100000/ML
500000 SUSPENSION, ORAL (FINAL DOSE FORM) ORAL 2 TIMES DAILY
Qty: 400 ML | Refills: 3 | Status: SHIPPED | OUTPATIENT
Start: 2022-02-22 | End: 2022-03-30

## 2022-02-22 ASSESSMENT — PAIN SCALES - GENERAL: PAINLEVEL: NO PAIN (0)

## 2022-02-22 NOTE — PATIENT INSTRUCTIONS
Cystic Fibrosis Self-Care Plan    RECOMMENDATIONS:   Michele, It is great to see you today.  The plan from today:  --try advair 1 puff twice daily  --nystatin swish and swallow BID  --endo referral  --renewed trikafta  --we will need labs in March will send orders to [facility]  Great job with self cares!    YOUR GOAL:  Stay safe and well.  Enjoy the end of winter!      [facility] Nurse line:  Jodi Corral  [phone]     [facility] Fax Number:      [phone]         Cystic Fibrosis Respiratory Therapists:   Alyse Cheek              [phone]          Consuelo Bonilla   [phone]  Cystic Fibrosis Dietitians:              Willa Romero              [phone]                            Alondra Vitale                        [phone]   Cystic Fibrosis Diabetes Nurse:    Livia Mondragon   [phone]    Cystic Fibrosis Social Workers:     Kaykay Vieyra               [phone]                     Rayna Díaz               [phone]  Cystic Fibrosis Pharmacists:           Jael Arvizu                               [phone]         Liset Cee      [phone]   Cystic Fibrosis Genetic Counselor:   Charisse Reveles    [phone]    [facility] website:  [email or web address]    COVID VACCINES:    You are eligible for the COVID-19 vaccine. Sign up for your COVID vaccine via [employer]. Log in, select the menu bar, select schedule an appointment, and then select COVID-19 Vaccine 1st Dose. You may also schedule by calling this number [phone] however hold times can be long.       OR schedule through the [facility] Vaccine Connector at https://vaccineconnector.[email or web address]/ or by calling [phone].      The best vaccine is the one that s available to you first.  All COVID-19 vaccines currently available in the [place] (Sam & Sam, Pfizer and Moderna) have been shown to be highly effect at preventing  COVID-19.       We re still learning how vaccines will affect the spread of COVID-19. After you ve been fully vaccinated against COVID-19, you should keep taking precautions in public places like wearing a mask, staying 6 feet apart from others, and avoiding crowds and poorly ventilated spaces until we know more.       MRN: [id number]   Clinic Date: February 21, 2022   Patient: Michele Altamirano     Annual Studies:   IGG   Date Value Ref Range Status   02/09/2018 883 695 - 1,620 mg/dL Final     Insulin   Date Value Ref Range Status   02/09/2018 36.2 (H) 3 - 25 mU/L Final     Comment:     Starting 7/13/2017, insulin results will decrease by approximately 37%   compared to insulin results reported in EPIC between (12/16/2016-7/12/2017),   and should be interpreted accordingly. Insulin results reported prior to   12/16/16 are comparable to current insulin results and therefore no adjustment   is needed.       There are no preventive care reminders to display for this patient.    Pulmonary Function Tests  FEV1: amount of air you can blow out in 1 second  FVC: total amount of air you can take in and blow out    Your Goals:         PFT Latest Ref Rng & Units 7/21/2021   FVC L 3.94   FEV1 L 2.73   FVC% % 84   FEV1% % 74          Airway Clearance: The Most Important Way to Keep Your Lungs Healthy  Vest Settings:    Hill-Rom Frequencies: 8, 9, 10 Pressure 10 Then, Frequencies 18, 19, 20 Pressure 6      RespirTech: Quick Start with Pressure of     Do each frequency for 5 minutes; Deflate vest after each frequency & cough 3 times before beginning the next setting.    Vest and Neb Therapy should be done 1 times/day.    Good Nutrition Can Improve Lung Function and Overall Health     Take ALL of your vitamins with food     Take 1/2 of your enzymes before EVERY meal/snack and the other 1/2 mid-meal/snack    Wt Readings from Last 3 Encounters:   07/21/21 75 kg (165 lb 5.5 oz)   06/25/21 74.5 kg (164 lb 3.9 oz)   04/17/19 73.5 kg  (162 lb)       There is no height or weight on file to calculate BMI.         National CF Foundation Recommendations for BMI in CF Adults: Women: at least 22 Men: at least 23        Controlling Blood Sugars Helps Prevent Lung Infections & Improves Nutrition  Test blood sugar:     In the morning before eating (goal is )     2 hours after a meal (goal is less than 150)     When pre-meal glucose is greater than 150 add correction     At bedtime (if less than 100 eat a snack with 15 grams of carbohydrates  Last A1C Results:   Hemoglobin A1C POCT   Date Value Ref Range Status   11/03/2020 6.1 (A) 0 - 5.6 % Final         If diabetic, measure A1C every 6 months. Goal: Under 7%    Staying Healthy    Research:  If you are interested in learning about research opportunities or have questions, please contact the CF Research Team at [phone] or [email or web address].      CF Foundation:  Compass is a personalized resource service to help you with the insurance, financial, legal and other issues you are facing.  It's free, confidential and available to anyone with CF.  Ask your  for more information or contact Compass directly at 958-COMPASS ([phone]) or compass@cff.org, or learn more at cff.org/compass.

## 2022-02-22 NOTE — LETTER
2/22/2022    RE: Michele Altamirano  [address] [facility] [address]    Dear Colleague,    Thank you for referring your patient, Michele Altamirano, to the [facility] [place]. Please see a copy of my visit note below.    [facility]  February 22, 2022         Assessment and Plan:   Michele Altamirano is a 56 year old male with cystic fibrosis.    1. 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  He does feel that his lungs are inflamed.  He says this is intermittent.  It does improve with exercise and increased activity.  He does not think it particularly limits his activity tolerance.  If anything it gets better when he is active.  Michele historically has grown out Serratia and Pseudomonas.  We did treat his Serratia from his last culture.  His pulmonary function test today are stable.  At this time I have recommended to Michele:  --He should continue to do his hypertonic saline twice daily  --He should continue his inhalational Cay[place] 2 weeks on 2 weeks off  --He should continue to exercise and has an excellent form of bronchial drainage therapy  --He should restart Advair to see if it helps with some of the inflammatory symptoms  --I have also prescribed him nystatin swish and swallow because his last attempted Advair did cause him to get thrush.    2. Pancreatic Insufficiency/GI:  Michele has no new symptoms consistent with worsening malabsorption.  Michele historically has had difficulty with reflux symptoms.  He reports they are currently well controlled.  - continue the present dose of pancreatic enzymes  - continue vitamin supplementation.    3.  CF TR modulator therapy: Michele is on Trikafta and tolerating it well.  I have asked him to obtain his annual studies labs sometime in the next couple months locally.  His last liver function studies were reviewed  by me today.  He is concerned about some weight gain on Trikafta.  He is pulling back on the use of she shakes supplements and eating popcorn.    4.  Reactive hypoglycemia: Michele describes a fair amount of reactive hypoglycemia.  He has both symptoms in the morning if he eats around 9 AM.  He also will have symptoms in the afternoon if he has sweets.  He has done a continuous glucose monitor in the past for clinical trial.  He is interested in reevaluating his glucose tolerance.  I have referred him on to endocrine for consideration of continuous glucose monitoring.    5.  Psychosocial: Michele continues to work full-time.  He is contemplating [employer] in a couple years.  His son and will be going to [employer] for [employer] engineering.  His wife is doing well but starting to go towards [employer] his well.    Annual studies due: review  Immunizations: review  Colonoscopy: review    Siobhan Munoz MD MPH  Associate Professor of Medicine  Pulmonary, Allergy, Critical Care and Sleep Medicine      Interval History:     Michele reports that he does not have much in the way of cough or sputum.  He does feel that his chest is somewhat inflamed.  He feels that if he were to use of continuous inhaler that this would improve.  He also reports improvement with activity and exercise.  He denies any chest congestion.  He is doing his hypertonic saline nebs 1-2 times daily.  He does use Cayston twice daily for 2 weeks on 2 weeks off.  He denies any shortness of breath.         Review of Systems:     CONSTITUTIONAL: no fever, no chills, no sweats, no change in weight, no change in energy--good, no change in appetite--to good    INTEGUMENTARY/SKIN: no rash, no obvious new lesions    ENT/MOUTH: no sore throat, no new sinus pain, no new nasal drainage, no new nasal congestion, no ear ringing     RESPIRATORY: see interval history    CV: no chest pain, no palpitations, no peripheral edema    GI: no nausea, no vomiting, no change in  stools, no fatty stools, controlled GERD    : negative    MUSCULOSKELETAL: Back and left foot pain    ENDOCRINE: Patient with reactive hypoglycemia    NEURO:  No headache    SLEEP: Continued difficulty with sleep onset, decrease frequency of restless legs, sleeping 6-1/2 to 7 hours each night    PSYCHIATRIC: mood stable--happy since childhood          Past Medical and Surgical History:     Past Medical History:   Diagnosis Date     CAP (community acquired pneumonia)     2011     CF (cystic fibrosis) (H)     diagnosed 1969, malnutrition, staph empyema     Chronic knee pain      Diverticular disease 2018    see on colonoscopy     GERD (gastroesophageal reflux disease)     egd in the past     Pancreatic insufficiency      Past Surgical History:   Procedure Laterality Date     ENDOSCOPIC SINUS SURGERY      removal of mucocele behind eye, 1992     OPTICAL TRACKING SYSTEM ENDOSCOPIC SINUS SURGERY Bilateral 11/28/2016    Procedure: OPTICAL TRACKING SYSTEM ENDOSCOPIC SINUS SURGERY;  Surgeon: Harriett Cosby MD;  Location:  OR           Family History:     Family History   Problem Relation Age of Onset     Lipids Father      Cardiovascular Father      Diabetes Maternal Grandmother         type 2            Social History:     Social History     Socioeconomic History     Marital status:      Spouse name: Not on file     Number of children: 1     Years of education: Not on file     Highest education level: Not on file   Occupational History     Occupation:    Tobacco Use     Smoking status: Never Smoker     Smokeless tobacco: Never Used   Substance and Sexual Activity     Alcohol use: No     Alcohol/week: 0.0 standard drinks     Drug use: No     Sexual activity: Yes     Partners: Female     Birth control/protection: None   Other Topics Concern     Parent/sibling w/ CABG, MI or angioplasty before 65F 55M? Not Asked   Social History Narrative    8/18/2020 --Lives in [place] with his wife and 15 yo son (Raghu).   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     Social Determinants of Health     Financial Resource Strain: Not on file   Food Insecurity: Not on file   Transportation Needs: Not on file   Physical Activity: Not on file   Stress: Not on file   Social Connections: Not on file   Intimate Partner Violence: Not on file   Housing Stability: Not on file            Medications:     Current Outpatient Medications   Medication     albuterol (PROAIR HFA/PROVENTIL HFA/VENTOLIN HFA) 108 (90 Base) MCG/ACT inhaler     azithromycin (ZITHROMAX) 500 MG tablet     CAYSTON 75 MG SOLR     Cholecalciferol (VITAMIN D) 2000 UNITS CAPS     elexacaftor-tezacaftor-ivacaftor & ivacaftor (TRIKAFTA) 100-50-75 & 150 MG tablet pack     fluticasone-salmeterol (ADVAIR) 250-50 MCG/DOSE inhaler     hydrochlorothiazide (MICROZIDE) 12.5 MG capsule     multivitamin CF formula (AQUADEKS) chewable tablet     nystatin (MYCOSTATIN) 397961 UNIT/ML suspension     pantoprazole (PROTONIX) 40 MG EC tablet     EDU AL[place]A NEBULIZER [place] [facility] [place]     Respiratory Therapy Supplies (EDU ALTERA NEBULIZER HANDSET[place] [facility] [place]     Respiratory Therapy Supplies (EDU ALTERA NEBULIZER HANDSET) MISC     sodium chloride inhalant 7 % NEBU neb solution     ZENPEP 62438-68491 units CPEP     No current facility-administered medications for this visit.            Physical Exam:   /84   Pulse 68   Resp 17   Ht 1.75 m (5' 8.9\")   Wt 77.9 kg (171 lb 11.8 oz)   SpO2 97%   BMI 25.43 kg/m      Constitutional:   Awake, alert and in no apparent distress     Eyes:   nonicteric     ENT:   normal tm bilaterally, mask in place     Neck:   Supple without supraclavicular or cervical lymphadenopathy     Lungs:   Good air flow.  No crackles.  Biapical rhonchi.  No wheezes.     Cardiovascular:   Normal S1 and S2.  RRR.  No murmur, gallop or rub.     Abdomen:   NABS, soft, nontender, nondistended.      Musculoskeletal:   No edema, " digital clubbing present     Neurologic:   Alert and conversant.     Skin:   Warm, dry.  No rash on limited exam.             Data:   All laboratory and imaging data reviewed.    Cystic Fibrosis Culture  Specimen Description   Date Value Ref Range Status   02/09/2018 Sputum  Final   12/22/2017 Sputum  Final   01/12/2016 Sputum  Final   01/12/2016 Sputum  Final   01/12/2016 Sputum  Final    Culture Micro   Date Value Ref Range Status   02/09/2018 Moderate growth  Normal tessa    Final   02/09/2018 (A)  Final    Moderate growth  Pseudomonas aeruginosa, mucoid strain     02/09/2018 Moderate growth  Pseudomonas aeruginosa   (A)  Final   02/09/2018 Light growth  Staphylococcus aureus   (A)  Final        Recent Results (from the past 168 hour(s))   Cystic Fibrosis Culture Aerobic Bacterial    Collection Time: 02/22/22  8:55 AM    Specimen: Throat; Swab   Result Value Ref Range    Culture Culture in progress     Culture 2+ Normal tessa to date     Culture 1+ Gram negative bacilli (A)        PFT: Mild obstructive lung disease.  When compared to 3/5/2021, the FEV1 and FVC have little change.      Pulmonary exacerbation: absent    60 minutes spent on the date of the encounter doing chart review, history and exam, documentation and further activities per the note  Time documented is excluding time spent for PFT interpretation.    Respiratory Therapist Note:       Vest                Brand: Hill-Rom - traditional Hill Rom: Frequencies 8, 9, 10 at pressure 10 then frequencies 18, 19, 20 at pressure 6.                Cough Pause: Cough Pause; Yes                Vest Garment Size: Adult Small                Last Fitting Date: 2022                Frequency of therapy: 0 times per week                Concerns: none         Exercise                 Does this qualify as additional airway clearance: No         Alternative Airway Clearance:               Nebulized Medications                Bronchodilators: Albuterol                 Mucolytic: 7% Hypertonic Saline                Antibiotics: Cayston                Additional Inhaled Medications: MDI                Spacer Use: yes         Review Cleaning: Yes. Soap and boil.         Education and Transition Information                Correct order of inhaled medications: Yes                Mechanism of Action of inhaled medications: Yes                Frequency of inhaled medications: Yes                Dosage of inhaled medications: Yes                Other:          Home Care:                Nebulizer Cups (Brand/Type): [employer]ah                Nebulizer Compressor                            Year Purchased:                             [facility], Phone: [phone], Fax: [phone]                Nebulizer Supply Company:                            [facility], Phone: [phone], Fax: [phone]       Oxygen:                              Pulmonary Rehab                Site:                 Date Completed:        Plan of Care and Goals for next visit: Exercise    Again, thank you for allowing me to participate in the care of your patient.      Sincerely,    Siobhan Munoz MD

## 2022-02-22 NOTE — NURSING NOTE
Chief Complaint   Patient presents with     RECHECK     Return Cystic Fibrosis     Medications reviewed and vital signs taken.   Jas Lanier, CMA

## 2022-02-22 NOTE — PROGRESS NOTES
Respiratory Therapist Note:         Vest                Brand: Hill-Rom - traditional Hill Rom: Frequencies 8, 9, 10 at pressure 10 then frequencies 18, 19, 20 at pressure 6.                Cough Pause: Cough Pause; Yes                Vest Garment Size: Adult Small                Last Fitting Date: 2022                Frequency of therapy: 0 times per week                Concerns: none         Exercise                 Does this qualify as additional airway clearance: No         Alternative Airway Clearance:               Nebulized Medications                Bronchodilators: Albuterol                Mucolytic: 7% Hypertonic Saline                Antibiotics: Cayston                Additional Inhaled Medications: MDI                Spacer Use: yes         Review Cleaning: Yes. Soap and boil.         Education and Transition Information                Correct order of inhaled medications: Yes                Mechanism of Action of inhaled medications: Yes                Frequency of inhaled medications: Yes                Dosage of inhaled medications: Yes                Other:          Home Care:                Nebulizer Cups (Brand/Type): [employer]                Nebulizer Compressor                            Year Purchased:                             [facility], Phone: [phone], Fax: [phone]                Nebulizer Supply Company:                            [facility], Phone: [phone], Fax: [phone]         Oxygen:                                     Pulmonary Rehab                Site:                 Date Completed:          Plan of Care and Goals for next visit: Exercise

## 2022-02-22 NOTE — PROGRESS NOTES
[facility]  Center for [facility]  February 22, 2022         Assessment and Plan:   Michele Altamirano is a 56 year old male with cystic fibrosis.    1. 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  He does feel that his lungs are inflamed.  He says this is intermittent.  It does improve with exercise and increased activity.  He does not think it particularly limits his activity tolerance.  If anything it gets better when he is active.  Michele historically has grown out Serratia and Pseudomonas.  We did treat his Serratia from his last culture.  His pulmonary function test today are stable.  At this time I have recommended to Michele:  --He should continue to do his hypertonic saline twice daily  --He should continue his inhalational [place] 2 weeks on 2 weeks off  --He should continue to exercise and has an excellent form of bronchial drainage therapy  --He should restart Advair to see if it helps with some of the inflammatory symptoms  --I have also prescribed him nystatin swish and swallow because his last attempted Advair did cause him to get thrush.    2. Pancreatic Insufficiency/GI:  Michele has no new symptoms consistent with worsening malabsorption.  Michele historically has had difficulty with reflux symptoms.  He reports they are currently well controlled.  - continue the present dose of pancreatic enzymes  - continue vitamin supplementation.    3.  CF TR modulator therapy: Michele is on Trikafta and tolerating it well.  I have asked him to obtain his annual studies labs sometime in the next couple months locally.  His last liver function studies were reviewed by me today.  He is concerned about some weight gain on Trikafta.  He is pulling back on the use of she shakes supplements and eating popcorn.    4.  Reactive hypoglycemia: Michele describes a fair amount of reactive hypoglycemia.  He has both symptoms in the  morning if he eats around 9 AM.  He also will have symptoms in the afternoon if he has sweets.  He has done a continuous glucose monitor in the past for clinical trial.  He is interested in reevaluating his glucose tolerance.  I have referred him on to endocrine for consideration of continuous glucose monitoring.    5.  Psychosocial: Michele continues to work full-time.  He is contemplating [employer] in a couple years.  His son and will be going to [employer] for mechanical engineering.  His wife is doing well but starting to go towards [employer] his well.    Annual studies due: review  Immunizations: review  Colonoscopy: review    Siobhan Munoz MD MPH  Associate Professor of Medicine  Pulmonary, Allergy, Critical Care and Sleep Medicine      Interval History:     Michele reports that he does not have much in the way of cough or sputum.  He does feel that his chest is somewhat inflamed.  He feels that if he were to use of continuous inhaler that this would improve.  He also reports improvement with activity and exercise.  He denies any chest congestion.  He is doing his hypertonic saline nebs 1-2 times daily.  He does use Cayston twice daily for 2 weeks on 2 weeks off.  He denies any shortness of breath.         Review of Systems:     CONSTITUTIONAL: no fever, no chills, no sweats, no change in weight, no change in energy--good, no change in appetite--to good    INTEGUMENTARY/SKIN: no rash, no obvious new lesions    ENT/MOUTH: no sore throat, no new sinus pain, no new nasal drainage, no new nasal congestion, no ear ringing     RESPIRATORY: see interval history    CV: no chest pain, no palpitations, no peripheral edema    GI: no nausea, no vomiting, no change in stools, no fatty stools, controlled GERD    : negative    MUSCULOSKELETAL: Back and left foot pain    ENDOCRINE: Patient with reactive hypoglycemia    NEURO:  No headache    SLEEP: Continued difficulty with sleep onset, decrease frequency of restless legs,  sleeping 6-1/2 to 7 hours each night    PSYCHIATRIC: mood stable--happy since childhood          Past Medical and Surgical History:     Past Medical History:   Diagnosis Date     CAP (community acquired pneumonia)     2011     CF (cystic fibrosis) (H)     diagnosed 1969, malnutrition, staph empyema     Chronic knee pain      Diverticular disease 2018    see on colonoscopy     GERD (gastroesophageal reflux disease)     egd in the past     Pancreatic insufficiency      Past Surgical History:   Procedure Laterality Date     ENDOSCOPIC SINUS SURGERY      removal of mucocele behind eye, 1992     OPTICAL TRACKING SYSTEM ENDOSCOPIC SINUS SURGERY Bilateral 11/28/2016    Procedure: OPTICAL TRACKING SYSTEM ENDOSCOPIC SINUS SURGERY;  Surgeon: Harriett Cosby MD;  Location:  OR           Family History:     Family History   Problem Relation Age of Onset     Lipids Father      Cardiovascular Father      Diabetes Maternal Grandmother         type 2            Social History:     Social History     Socioeconomic History     Marital status:      Spouse name: Not on file     Number of children: 1     Years of education: Not on file     Highest education level: Not on file   Occupational History     Occupation:    Tobacco Use     Smoking status: Never Smoker     Smokeless tobacco: Never Used   Substance and Sexual Activity     Alcohol use: No     Alcohol/week: 0.0 standard drinks     Drug use: No     Sexual activity: Yes     Partners: Female     Birth control/protection: None   Other Topics Concern     Parent/sibling w/ CABG, MI or angioplasty before 65F 55M? Not Asked   Social History Narrative    8/18/2020 --Lives in [place] with his wife and 15 yo son (Raghu).  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     Social Determinants of Health     Financial Resource Strain: Not on file   Food Insecurity: Not on file   Transportation Needs: Not on file  "  Physical Activity: Not on file   Stress: Not on file   Social Connections: Not on file   Intimate Partner Violence: Not on file   Housing Stability: Not on file            Medications:     Current Outpatient Medications   Medication     albuterol (PROAIR HFA/PROVENTIL HFA/VENTOLIN HFA) 108 (90 Base) MCG/ACT inhaler     azithromycin (ZITHROMAX) 500 MG tablet     CAYSTON 75 MG SOLR     Cholecalciferol (VITAMIN D) 2000 UNITS CAPS     elexacaftor-tezacaftor-ivacaftor & ivacaftor (TRIKAFTA) 100-50-75 & 150 MG tablet pack     fluticasone-salmeterol (ADVAIR) 250-50 MCG/DOSE inhaler     hydrochlorothiazide (MICROZIDE) 12.5 MG capsule     multivitamin CF formula (AQUADEKS) chewable tablet     nystatin (MYCOSTATIN) 777344 UNIT/ML suspension     pantoprazole (PROTONIX) 40 MG EC tablet     EDU ALTERA NEBULIZER SYSTEM MISC     Respiratory Therapy Supplies (EDU ALTERA NEBULIZER HANDSET) MISC     Respiratory Therapy Supplies (EDU ALTERA NEBULIZER HANDSET) MISC     sodium chloride inhalant 7 % NEBU neb solution     ZENPEP 02922-80644 units CPEP     No current facility-administered medications for this visit.            Physical Exam:   /84   Pulse 68   Resp 17   Ht 1.75 m (5' 8.9\")   Wt 77.9 kg (171 lb 11.8 oz)   SpO2 97%   BMI 25.43 kg/m      Constitutional:   Awake, alert and in no apparent distress     Eyes:   nonicteric     ENT:   normal tm bilaterally, mask in place     Neck:   Supple without supraclavicular or cervical lymphadenopathy     Lungs:   Good air flow.  No crackles.  Biapical rhonchi.  No wheezes.     Cardiovascular:   Normal S1 and S2.  RRR.  No murmur, gallop or rub.     Abdomen:   NABS, soft, nontender, nondistended.      Musculoskeletal:   No edema, digital clubbing present     Neurologic:   Alert and conversant.     Skin:   Warm, dry.  No rash on limited exam.             Data:   All laboratory and imaging data reviewed.    Cystic Fibrosis Culture  Specimen Description   Date Value Ref Range " Status   02/09/2018 Sputum  Final   12/22/2017 Sputum  Final   01/12/2016 Sputum  Final   01/12/2016 Sputum  Final   01/12/2016 Sputum  Final    Culture Micro   Date Value Ref Range Status   02/09/2018 Moderate growth  Normal tessa    Final   02/09/2018 (A)  Final    Moderate growth  Pseudomonas aeruginosa, mucoid strain     02/09/2018 Moderate growth  Pseudomonas aeruginosa   (A)  Final   02/09/2018 Light growth  Staphylococcus aureus   (A)  Final        Recent Results (from the past 168 hour(s))   Cystic Fibrosis Culture Aerobic Bacterial    Collection Time: 02/22/22  8:55 AM    Specimen: Throat; Swab   Result Value Ref Range    Culture Culture in progress     Culture 2+ Normal tessa to date     Culture 1+ Gram negative bacilli (A)        PFT: Mild obstructive lung disease.  When compared to 3/5/2021, the FEV1 and FVC have little change.      Pulmonary exacerbation: absent    60 minutes spent on the date of the encounter doing chart review, history and exam, documentation and further activities per the note  Time documented is excluding time spent for PFT interpretation.

## 2022-02-24 ENCOUNTER — TELEPHONE (OUTPATIENT)
Dept: PULMONOLOGY | Facility: CLINIC | Age: 57
End: 2022-02-24
Payer: COMMERCIAL

## 2022-02-24 NOTE — TELEPHONE ENCOUNTER
LVM about scheduling 3 month follow up with Dr. Munoz in addition to testing prior to the appointment. Left direct call back number.

## 2022-02-27 LAB
BACTERIA SPEC CULT: ABNORMAL
BACTERIA SPEC CULT: ABNORMAL

## 2022-02-28 ENCOUNTER — TELEPHONE (OUTPATIENT)
Dept: PULMONOLOGY | Facility: CLINIC | Age: 57
End: 2022-02-28
Payer: COMMERCIAL

## 2022-03-02 ENCOUNTER — TELEPHONE (OUTPATIENT)
Dept: PULMONOLOGY | Facility: CLINIC | Age: 57
End: 2022-03-02
Payer: COMMERCIAL

## 2022-03-17 DIAGNOSIS — E84.0 CYSTIC FIBROSIS WITH PULMONARY MANIFESTATIONS (H): ICD-10-CM

## 2022-03-17 RX ORDER — NEBULIZER
1 EACH MISCELLANEOUS 3 TIMES DAILY
Qty: 1 EACH | Refills: 1 | Status: SHIPPED | OUTPATIENT
Start: 2022-03-17 | End: 2022-03-25

## 2022-03-20 DIAGNOSIS — E84.0 CYSTIC FIBROSIS WITH PULMONARY MANIFESTATIONS (H): ICD-10-CM

## 2022-03-20 DIAGNOSIS — R73.09 ABNORMAL GLUCOSE TOLERANCE TEST: ICD-10-CM

## 2022-03-20 DIAGNOSIS — K21.9 GASTROESOPHAGEAL REFLUX DISEASE WITHOUT ESOPHAGITIS: ICD-10-CM

## 2022-03-20 DIAGNOSIS — E84.9 CF (CYSTIC FIBROSIS) (H): ICD-10-CM

## 2022-03-20 DIAGNOSIS — E55.9 VITAMIN D DEFICIENCY: ICD-10-CM

## 2022-03-20 DIAGNOSIS — K86.89 PANCREATIC INSUFFICIENCY: ICD-10-CM

## 2022-03-20 DIAGNOSIS — Z13.1 SCREENING FOR DIABETES MELLITUS (DM): ICD-10-CM

## 2022-03-20 DIAGNOSIS — Z13.220 SCREENING FOR HYPERLIPIDEMIA: ICD-10-CM

## 2022-03-20 DIAGNOSIS — Z13.220 LIPID SCREENING: ICD-10-CM

## 2022-03-21 RX ORDER — SODIUM CHLORIDE FOR INHALATION 7 %
4 VIAL, NEBULIZER (ML) INHALATION DAILY
Qty: 720 ML | Refills: 3 | Status: SHIPPED | OUTPATIENT
Start: 2022-03-21

## 2022-03-25 ENCOUNTER — TELEPHONE (OUTPATIENT)
Dept: PULMONOLOGY | Facility: CLINIC | Age: 57
End: 2022-03-25
Payer: COMMERCIAL

## 2022-03-25 DIAGNOSIS — E84.0 CYSTIC FIBROSIS WITH PULMONARY MANIFESTATIONS (H): ICD-10-CM

## 2022-03-25 DIAGNOSIS — E84.9 CF (CYSTIC FIBROSIS) (H): ICD-10-CM

## 2022-03-25 RX ORDER — AZTREONAM 75 MG/ML
KIT INHALATION
Qty: 84 ML | Refills: 6 | Status: SHIPPED | OUTPATIENT
Start: 2022-03-25 | End: 2024-04-09

## 2022-03-25 RX ORDER — NEBULIZER
1 EACH MISCELLANEOUS 3 TIMES DAILY
Qty: 1 EACH | Refills: 5 | Status: SHIPPED | OUTPATIENT
Start: 2022-03-25

## 2022-03-25 RX ORDER — ELEXACAFTOR, TEZACAFTOR, AND IVACAFTOR 100-50-75
KIT ORAL
Qty: 84 TABLET | Refills: 3 | Status: SHIPPED | OUTPATIENT
Start: 2022-03-25 | End: 2022-07-13

## 2022-03-25 RX ORDER — NEBULIZER
1 EACH MISCELLANEOUS 3 TIMES DAILY
Qty: 1 EACH | Refills: 1 | Status: SHIPPED | OUTPATIENT
Start: 2022-03-25

## 2022-03-25 NOTE — TELEPHONE ENCOUNTER
Patient called and left voicemail about scheduling follow up with Dr. Munoz. Patient wanted to schedule in July. Patient stated he did not want to do a PFT prior to his follow up appointment. Patient stated that he will be getting labs done prior to his appointment locally. Patient scheduled for follow up appointment on 7/19/22. Details of appointment confirmed with patient.

## 2022-03-30 ENCOUNTER — MYC MEDICAL ADVICE (OUTPATIENT)
Dept: PULMONOLOGY | Facility: CLINIC | Age: 57
End: 2022-03-30
Payer: COMMERCIAL

## 2022-03-30 DIAGNOSIS — E84.9 CF (CYSTIC FIBROSIS) (H): ICD-10-CM

## 2022-03-30 DIAGNOSIS — E84.0 CYSTIC FIBROSIS WITH PULMONARY MANIFESTATIONS (H): ICD-10-CM

## 2022-03-30 RX ORDER — NYSTATIN 100000/ML
500000 SUSPENSION, ORAL (FINAL DOSE FORM) ORAL 2 TIMES DAILY
Qty: 400 ML | Refills: 3 | Status: SHIPPED | OUTPATIENT
Start: 2022-03-30 | End: 2022-11-22

## 2022-04-22 ENCOUNTER — TELEPHONE (OUTPATIENT)
Dept: ADMINISTRATIVE | Facility: CLINIC | Age: 57
End: 2022-04-22
Payer: COMMERCIAL

## 2022-04-22 NOTE — PROGRESS NOTES
Cystic Fibrosis Clinical Follow Up Assessment   Assessment Link -Cystic Fibrosis Clinical Follow Up Assessment     2022/04/22 21:14:48 [facility] [place], by Maru Artis      Activity Date 2022/04/22      Care Details    What are the patient's goals for this therapy?   ? Trikafta exceeded his expectations in it's ability to reduce inflammation and phlegm production.- 1/4/2020 4/22/22 - pt did not answer      Did you identify any patient barriers to access and successful treatment?   ? No barriers to access identified      Is it appropriate to collect a PDC at this time? [QA Metric] (An MPR or PDC would not be appropriate for cycled medications or if the patient is on therapy   ?Yes      Document the date of the last refill of PDC   ? 2022-03-30      Document PDC   ? 1.0      Has the patient missed doses inappropriately?   ? No      Please select CURRENT side effect(s) for monitoring:   ? None       Summary Notes   Spoke to Michele via relay texting for CF assessment.   Trikafta: He continues to do well. Denies missed doses and is still taking appropriately as directed. Denies any side effects, questions, or concerns. PDC = 1.00.   CF: Added Advair Diskus and Nystatin mouthwash to DUR profile. No new DDIs. No other health changes or allergies.   TM clinician will continue bi-annual assessments.    Maru Artis, PharmD  Specialty Pharmacist  [facility]  [email or web address]  [email or web address]  Specialty: [phone]

## 2022-05-11 ENCOUNTER — LAB (OUTPATIENT)
Dept: LAB | Facility: CLINIC | Age: 57
End: 2022-05-11
Payer: COMMERCIAL

## 2022-05-11 ENCOUNTER — OFFICE VISIT (OUTPATIENT)
Dept: PULMONOLOGY | Facility: CLINIC | Age: 57
End: 2022-05-11
Attending: INTERNAL MEDICINE
Payer: COMMERCIAL

## 2022-05-11 VITALS
HEART RATE: 90 BPM | DIASTOLIC BLOOD PRESSURE: 90 MMHG | OXYGEN SATURATION: 97 % | SYSTOLIC BLOOD PRESSURE: 149 MMHG | WEIGHT: 164.68 LBS | HEIGHT: 69 IN | BODY MASS INDEX: 24.39 KG/M2

## 2022-05-11 DIAGNOSIS — E84.0 CYSTIC FIBROSIS WITH PULMONARY MANIFESTATIONS (H): Primary | ICD-10-CM

## 2022-05-11 DIAGNOSIS — E84.9 CF (CYSTIC FIBROSIS) (H): ICD-10-CM

## 2022-05-11 DIAGNOSIS — E84.0 CYSTIC FIBROSIS WITH PULMONARY MANIFESTATIONS (H): ICD-10-CM

## 2022-05-11 LAB
ALBUMIN SERPL-MCNC: 3.8 G/DL (ref 3.4–5)
ALBUMIN UR-MCNC: NEGATIVE MG/DL
ALP SERPL-CCNC: 78 U/L (ref 40–150)
ALT SERPL W P-5'-P-CCNC: 46 U/L (ref 0–70)
ANION GAP SERPL CALCULATED.3IONS-SCNC: 7 MMOL/L (ref 3–14)
APPEARANCE UR: ABNORMAL
AST SERPL W P-5'-P-CCNC: 31 U/L (ref 0–45)
BASOPHILS # BLD AUTO: 0.1 10E3/UL (ref 0–0.2)
BASOPHILS NFR BLD AUTO: 1 %
BILIRUB DIRECT SERPL-MCNC: 0.3 MG/DL (ref 0–0.2)
BILIRUB SERPL-MCNC: 1.1 MG/DL (ref 0.2–1.3)
BILIRUB UR QL STRIP: NEGATIVE
BUN SERPL-MCNC: 22 MG/DL (ref 7–30)
CALCIUM SERPL-MCNC: 9.2 MG/DL (ref 8.5–10.1)
CHLORIDE BLD-SCNC: 106 MMOL/L (ref 94–109)
CHOLEST SERPL-MCNC: 173 MG/DL
CK SERPL-CCNC: 193 U/L (ref 30–300)
CO2 SERPL-SCNC: 31 MMOL/L (ref 20–32)
COLOR UR AUTO: ABNORMAL
CREAT SERPL-MCNC: 0.9 MG/DL (ref 0.66–1.25)
CREAT UR-MCNC: 157 MG/DL
DEPRECATED CALCIDIOL+CALCIFEROL SERPL-MC: 32 UG/L (ref 20–75)
EOSINOPHIL # BLD AUTO: 0.1 10E3/UL (ref 0–0.7)
EOSINOPHIL NFR BLD AUTO: 1 %
ERYTHROCYTE [DISTWIDTH] IN BLOOD BY AUTOMATED COUNT: 14.6 % (ref 10–15)
ERYTHROCYTE [SEDIMENTATION RATE] IN BLOOD BY WESTERGREN METHOD: 5 MM/HR (ref 0–20)
EXPTIME-PRE: 11.77 SEC
FASTING STATUS PATIENT QL REPORTED: NORMAL
FEF2575-%PRED-PRE: 47 %
FEF2575-PRE: 1.51 L/SEC
FEF2575-PRED: 3.2 L/SEC
FEFMAX-%PRED-PRE: 97 %
FEFMAX-PRE: 9.08 L/SEC
FEFMAX-PRED: 9.31 L/SEC
FERRITIN SERPL-MCNC: 33 NG/ML (ref 26–388)
FEV1-%PRED-PRE: 76 %
FEV1-PRE: 2.78 L
FEV1FEV6-PRE: 73 %
FEV1FEV6-PRED: 80 %
FEV1FVC-PRE: 69 %
FEV1FVC-PRED: 78 %
FIFMAX-PRE: 7.04 L/SEC
FVC-%PRED-PRE: 86 %
FVC-PRE: 4.03 L
FVC-PRED: 4.65 L
GFR SERPL CREATININE-BSD FRML MDRD: >90 ML/MIN/1.73M2
GGT SERPL-CCNC: 56 U/L (ref 0–75)
GLUCOSE BLD-MCNC: 88 MG/DL (ref 70–99)
GLUCOSE UR STRIP-MCNC: NEGATIVE MG/DL
HBA1C MFR BLD: 5.9 % (ref 0–5.6)
HCT VFR BLD AUTO: 42.3 % (ref 40–53)
HDLC SERPL-MCNC: 63 MG/DL
HGB BLD-MCNC: 14.6 G/DL (ref 13.3–17.7)
HGB UR QL STRIP: NEGATIVE
IGA SERPL-MCNC: 181 MG/DL (ref 84–499)
IGG SERPL-MCNC: 779 MG/DL (ref 610–1616)
IGM SERPL-MCNC: 62 MG/DL (ref 35–242)
IMM GRANULOCYTES # BLD: 0 10E3/UL
IMM GRANULOCYTES NFR BLD: 0 %
INR PPP: 0.92 (ref 0.85–1.15)
IRON SATN MFR SERPL: 30 % (ref 15–46)
IRON SERPL-MCNC: 121 UG/DL (ref 35–180)
IRON SERPL-MCNC: 123 UG/DL (ref 35–180)
KETONES UR STRIP-MCNC: NEGATIVE MG/DL
LDLC SERPL CALC-MCNC: 88 MG/DL
LEUKOCYTE ESTERASE UR QL STRIP: NEGATIVE
LYMPHOCYTES # BLD AUTO: 1.6 10E3/UL (ref 0.8–5.3)
LYMPHOCYTES NFR BLD AUTO: 30 %
MAGNESIUM SERPL-MCNC: 2.1 MG/DL (ref 1.6–2.3)
MCH RBC QN AUTO: 30.3 PG (ref 26.5–33)
MCHC RBC AUTO-ENTMCNC: 34.5 G/DL (ref 31.5–36.5)
MCV RBC AUTO: 88 FL (ref 78–100)
MICROALBUMIN UR-MCNC: 11 MG/L
MICROALBUMIN/CREAT UR: 7.01 MG/G CR (ref 0–17)
MONOCYTES # BLD AUTO: 0.5 10E3/UL (ref 0–1.3)
MONOCYTES NFR BLD AUTO: 10 %
MUCOUS THREADS #/AREA URNS LPF: PRESENT /LPF
NEUTROPHILS # BLD AUTO: 3.1 10E3/UL (ref 1.6–8.3)
NEUTROPHILS NFR BLD AUTO: 58 %
NITRATE UR QL: NEGATIVE
NONHDLC SERPL-MCNC: 110 MG/DL
NRBC # BLD AUTO: 0 10E3/UL
NRBC BLD AUTO-RTO: 0 /100
PH UR STRIP: 6 [PH] (ref 5–7)
PHOSPHATE SERPL-MCNC: 2.8 MG/DL (ref 2.5–4.5)
PLATELET # BLD AUTO: 224 10E3/UL (ref 150–450)
POTASSIUM BLD-SCNC: 3.9 MMOL/L (ref 3.4–5.3)
PROT SERPL-MCNC: 6.9 G/DL (ref 6.8–8.8)
RBC # BLD AUTO: 4.82 10E6/UL (ref 4.4–5.9)
RBC URINE: 1 /HPF
SODIUM SERPL-SCNC: 144 MMOL/L (ref 133–144)
SP GR UR STRIP: 1.02 (ref 1–1.03)
TIBC SERPL-MCNC: 412 UG/DL (ref 240–430)
TRIGL SERPL-MCNC: 109 MG/DL
TSH SERPL DL<=0.005 MIU/L-ACNC: 1.28 MU/L (ref 0.4–4)
UROBILINOGEN UR STRIP-MCNC: NORMAL MG/DL
WBC # BLD AUTO: 5.3 10E3/UL (ref 4–11)
WBC URINE: <1 /HPF

## 2022-05-11 PROCEDURE — 87077 CULTURE AEROBIC IDENTIFY: CPT | Performed by: INTERNAL MEDICINE

## 2022-05-11 PROCEDURE — 80061 LIPID PANEL: CPT | Performed by: PATHOLOGY

## 2022-05-11 PROCEDURE — 82784 ASSAY IGA/IGD/IGG/IGM EACH: CPT | Performed by: INTERNAL MEDICINE

## 2022-05-11 PROCEDURE — 84446 ASSAY OF VITAMIN E: CPT | Mod: 90 | Performed by: PATHOLOGY

## 2022-05-11 PROCEDURE — 82043 UR ALBUMIN QUANTITATIVE: CPT | Performed by: PATHOLOGY

## 2022-05-11 PROCEDURE — 82977 ASSAY OF GGT: CPT | Performed by: PATHOLOGY

## 2022-05-11 PROCEDURE — 83550 IRON BINDING TEST: CPT | Performed by: PATHOLOGY

## 2022-05-11 PROCEDURE — 94375 RESPIRATORY FLOW VOLUME LOOP: CPT | Performed by: INTERNAL MEDICINE

## 2022-05-11 PROCEDURE — 83540 ASSAY OF IRON: CPT | Performed by: PATHOLOGY

## 2022-05-11 PROCEDURE — 99215 OFFICE O/P EST HI 40 MIN: CPT | Mod: 25 | Performed by: INTERNAL MEDICINE

## 2022-05-11 PROCEDURE — 83036 HEMOGLOBIN GLYCOSYLATED A1C: CPT | Performed by: PATHOLOGY

## 2022-05-11 PROCEDURE — 83735 ASSAY OF MAGNESIUM: CPT | Performed by: PATHOLOGY

## 2022-05-11 PROCEDURE — 82306 VITAMIN D 25 HYDROXY: CPT | Performed by: INTERNAL MEDICINE

## 2022-05-11 PROCEDURE — 99000 SPECIMEN HANDLING OFFICE-LAB: CPT | Performed by: PATHOLOGY

## 2022-05-11 PROCEDURE — 84590 ASSAY OF VITAMIN A: CPT | Mod: 90 | Performed by: PATHOLOGY

## 2022-05-11 PROCEDURE — 82248 BILIRUBIN DIRECT: CPT | Performed by: PATHOLOGY

## 2022-05-11 PROCEDURE — 87070 CULTURE OTHR SPECIMN AEROBIC: CPT | Performed by: INTERNAL MEDICINE

## 2022-05-11 PROCEDURE — 82728 ASSAY OF FERRITIN: CPT | Performed by: PATHOLOGY

## 2022-05-11 PROCEDURE — 80050 GENERAL HEALTH PANEL: CPT | Performed by: PATHOLOGY

## 2022-05-11 PROCEDURE — 85652 RBC SED RATE AUTOMATED: CPT | Performed by: PATHOLOGY

## 2022-05-11 PROCEDURE — 36415 COLL VENOUS BLD VENIPUNCTURE: CPT | Performed by: PATHOLOGY

## 2022-05-11 PROCEDURE — 81001 URINALYSIS AUTO W/SCOPE: CPT | Performed by: PATHOLOGY

## 2022-05-11 PROCEDURE — 84403 ASSAY OF TOTAL TESTOSTERONE: CPT | Performed by: INTERNAL MEDICINE

## 2022-05-11 PROCEDURE — 86769 SARS-COV-2 COVID-19 ANTIBODY: CPT | Mod: 90 | Performed by: PATHOLOGY

## 2022-05-11 PROCEDURE — 85610 PROTHROMBIN TIME: CPT | Performed by: PATHOLOGY

## 2022-05-11 PROCEDURE — 82550 ASSAY OF CK (CPK): CPT | Performed by: PATHOLOGY

## 2022-05-11 PROCEDURE — 82785 ASSAY OF IGE: CPT | Performed by: INTERNAL MEDICINE

## 2022-05-11 PROCEDURE — G0463 HOSPITAL OUTPT CLINIC VISIT: HCPCS | Mod: 25

## 2022-05-11 PROCEDURE — 84100 ASSAY OF PHOSPHORUS: CPT | Performed by: PATHOLOGY

## 2022-05-11 ASSESSMENT — PAIN SCALES - GENERAL: PAINLEVEL: NO PAIN (0)

## 2022-05-11 NOTE — PROGRESS NOTES
[facility]  [facility]  May 11, 2022         Assessment and Plan:   Michele Altamirano is a 56 year old male with cystic fibrosis.    1. CF lung disease with mild obstruction: Michele reports that from a pulmonary point of view he is still struggling with limitations with feeling like he is hitting a wall with exhalation.  It does not feel different than previous.  He does not feel that there is anything that particularly resolves the symptoms.  Despite this he remains quite active and does exercise without difficulty.  He does feel like it is more inflamed.  Fortunately his pulmonary function tests today are stable.  He continues to show evidence of Pseudomonas in sputum.  At this time I recommended to Michele:  -- We discussed the use of other mucolytic therapies including N-acetylcysteine, Pulmozyme or continuing hypertonic saline.  We discussed the mechanism of action and the pros and cons of each.  He will continue to use his hypertonic saline at this time.  I am certainly open to a change if he is interested.  -- Michele continues to remain quite active which is always been an excellent form of bronchial drainage for him.    2. Pancreatic Insufficiency/GI: Michele has no new symptoms consistent with worsening malabsorption.    - continue the present dose of pancreatic enzymes  - continue vitamin supplementation.    3.  Reactive hypoglycemia: Michele is scheduled to see Dr. Obrien.  I defer to him for management.    4.  CF TR modulator therapy: Is on Trikafta and tolerating well.  We did obtain labs today which show a stable hepatic panel.  He did have some weight gain in the past but now is showing evidence of some weight loss and correction to what is probably his baseline.    4.  Psychosocial: Michele continues to work full-time.  His business dose recently bought out.  He has a little bit more time available to him now that he is no longer in meetings.    5.  Annual studies labs: Michele  did have his labs performed today after his visit.  I have reviewed the.  I will follow-up with him as necessary.  I will also ask our dietitian to contact him if there is any concern related to his vitamin levels.    Annual studies due: review  Immunizations: COVID booster  Colonoscopy: reviewed    Siobhan Munoz MD MPH  Associate Professor of Medicine  Pulmonary, Allergy, Critical Care and Sleep Medicine      Interval History:     Michele does report a cough some little with his saline neb.  He occasionally produces a bit of sputum.  Again when he exhales he does know this is a limitation in that.  He does premed with that.  He does do his nebulized therapy twice daily.         Review of Systems:     CONSTITUTIONAL: no fever, no chills, no sweats, no change in weight, no change in energy, no change in appetite--better than ever    INTEGUMENTARY/SKIN: no rash, no obvious new lesions    ENT/MOUTH: no sore throat, no new sinus pain, no new nasal drainage, no new nasal congestion, no ear ringing     RESPIRATORY: see interval history    CV: no chest pain, no palpitations, no peripheral edema    GI: no nausea, no vomiting, no change in stools, no fatty stools, no GERD    : negative urinary    MUSCULOSKELETAL: Some restless leg    ENDOCRINE: Due to see endocrinology    NEURO:  No headache    SLEEP: no issues--better, forcing himself to go to bed at 1030    PSYCHIATRIC: mood stable          Past Medical and Surgical History:     Past Medical History:   Diagnosis Date     CAP (community acquired pneumonia)     2011     CF (cystic fibrosis) (H)     diagnosed 1969, malnutrition, staph empyema     Chronic knee pain      Diverticular disease 2018    see on colonoscopy     GERD (gastroesophageal reflux disease)     egd in the past     Pancreatic insufficiency      Past Surgical History:   Procedure Laterality Date     ENDOSCOPIC SINUS SURGERY      removal of mucocele behind eye, 1992     OPTICAL TRACKING SYSTEM ENDOSCOPIC  SINUS SURGERY Bilateral 11/28/2016    Procedure: OPTICAL TRACKING SYSTEM ENDOSCOPIC SINUS SURGERY;  Surgeon: Harriett Cosby MD;  Location: [facility] OR           Family History:     Family History   Problem Relation Age of Onset     Lipids Father      Cardiovascular Father      Diabetes Maternal Grandmother         type 2            Social History:     Social History     Socioeconomic History     Marital status:      Spouse name: Not on file     Number of children: 1     Years of education: Not on file     Highest education level: Not on file   Occupational History     Occupation:    Tobacco Use     Smoking status: Never Smoker     Smokeless tobacco: Never Used   Substance and Sexual Activity     Alcohol use: No     Alcohol/week: 0.0 standard drinks     Drug use: No     Sexual activity: Yes     Partners: Female     Birth control/protection: None   Other Topics Concern     Parent/sibling w/ CABG, MI or angioplasty before 65F 55M? Not Asked   Social History Narrative    8/18/2020 --Lives in [place] with his wife and 15 yo son (Raghu).  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     Social Determinants of Health     Financial Resource Strain: Not on file   Food Insecurity: Not on file   Transportation Needs: Not on file   Physical Activity: Not on file   Stress: Not on file   Social Connections: Not on file   Intimate Partner Violence: Not on file   Housing Stability: Not on file            Medications:     Current Outpatient Medications   Medication     albuterol (PROAIR HFA/PROVENTIL HFA/VENTOLIN HFA) 108 (90 Base) MCG/ACT inhaler     azithromycin (ZITHROMAX) 500 MG tablet     aztreonam lysine (CAYSTON) 75 MG SOLR     Cholecalciferol (VITAMIN D) 2000 UNITS CAPS     fluticasone-salmeterol (ADVAIR) 250-50 MCG/DOSE inhaler     hydrochlorothiazide (MICROZIDE) 12.5 MG capsule     multivitamin CF formula (AQUADEKS) chewable tablet     nystatin  "(MYCOSTATIN) 757596 UNIT/ML suspension     pantoprazole (PROTONIX) 40 MG EC tablet     Norah Altera Nebulizer System [facility]     Respiratory Therapy Supplies (NORAH ALTERA NEBULIZER HANDSET) [facility]     Respiratory Therapy Supplies (NORAH ALTERA NEBULIZER HANDSET) MISC     sodium chloride inhalant 7 % NEBU neb solution     TRIKAFTA 100-50-75 & 150 MG tablet pack     ZENPEP 93126-78155 units CPEP     No current facility-administered medications for this visit.            Physical Exam:   BP (!) 149/90   Pulse 90   Ht 1.75 m (5' 8.9\")   Wt 74.7 kg (164 lb 10.9 oz)   SpO2 97%   BMI 24.39 kg/m  --Repeat blood pressure 120/84    Constitutional:   Awake, alert and in no apparent distress     Eyes:   nonicteric     ENT:   Mask in place     Neck:   Supple without supraclavicular or cervical lymphadenopathy     Lungs:   Good air flow.  No crackles. No rhonchi.  No wheezes.     Cardiovascular:   Normal S1 and S2.  RRR.  No murmur, gallop or rub.     Abdomen:   NABS, soft, nontender, nondistended.      Musculoskeletal:   No edema, digital clubbing present     Neurologic:   Alert and conversant.     Skin:   Warm, dry.  No rash on limited exam.             Data:   All laboratory and imaging data reviewed.    Cystic Fibrosis Culture  Specimen Description   Date Value Ref Range Status   02/09/2018 Sputum  Final   12/22/2017 Sputum  Final   01/12/2016 Sputum  Final   01/12/2016 Sputum  Final   01/12/2016 Sputum  Final    Culture Micro   Date Value Ref Range Status   02/09/2018 Moderate growth  Normal tessa    Final   02/09/2018 (A)  Final    Moderate growth  Pseudomonas aeruginosa, mucoid strain     02/09/2018 Moderate growth  Pseudomonas aeruginosa   (A)  Final   02/09/2018 Light growth  Staphylococcus aureus   (A)  Final        Recent Results (from the past 168 hour(s))   General PFT Lab (Please always keep checked)    Collection Time: 05/11/22  8:36 AM   Result Value Ref Range    FVC-Pred 4.65 L    FVC-Pre 4.03 L    FVC-%Pred-Pre 86 " %    FEV1-Pre 2.78 L    FEV1-%Pred-Pre 76 %    FEV1FVC-Pred 78 %    FEV1FVC-Pre 69 %    FEFMax-Pred 9.31 L/sec    FEFMax-Pre 9.08 L/sec    FEFMax-%Pred-Pre 97 %    FEF2575-Pred 3.20 L/sec    FEF2575-Pre 1.51 L/sec    NJY1787-%Pred-Pre 47 %    ExpTime-Pre 11.77 sec    FIFMax-Pre 7.04 L/sec    FEV1FEV6-Pred 80 %    FEV1FEV6-Pre 73 %   Iron & Iron Binding Capacity    Collection Time: 05/11/22 10:37 AM   Result Value Ref Range    Iron 123 35 - 180 ug/dL    Iron Binding Capacity 412 240 - 430 ug/dL    Iron Sat Index 30 15 - 46 %   Hepatic panel (Albumin, ALT, AST, Bili, Alk Phos, TP)    Collection Time: 05/11/22 11:07 AM   Result Value Ref Range    Bilirubin Total 1.1 0.2 - 1.3 mg/dL    Bilirubin Direct 0.3 (H) 0.0 - 0.2 mg/dL    Protein Total 6.9 6.8 - 8.8 g/dL    Albumin 3.8 3.4 - 5.0 g/dL    Alkaline Phosphatase 78 40 - 150 U/L    AST 31 0 - 45 U/L    ALT 46 0 - 70 U/L   CK total    Collection Time: 05/11/22 11:07 AM   Result Value Ref Range     30 - 300 U/L   Basic metabolic panel    Collection Time: 05/11/22 11:07 AM   Result Value Ref Range    Sodium 144 133 - 144 mmol/L    Potassium 3.9 3.4 - 5.3 mmol/L    Chloride 106 94 - 109 mmol/L    Carbon Dioxide (CO2) 31 20 - 32 mmol/L    Anion Gap 7 3 - 14 mmol/L    Urea Nitrogen 22 7 - 30 mg/dL    Creatinine 0.90 0.66 - 1.25 mg/dL    Calcium 9.2 8.5 - 10.1 mg/dL    Glucose 88 70 - 99 mg/dL    GFR Estimate >90 >60 mL/min/1.73m2   Lipid Profile    Collection Time: 05/11/22 11:07 AM   Result Value Ref Range    Cholesterol 173 <200 mg/dL    Triglycerides 109 <150 mg/dL    Direct Measure HDL 63 >=40 mg/dL    LDL Cholesterol Calculated 88 <=100 mg/dL    Non HDL Cholesterol 110 <130 mg/dL    Patient Fasting > 8hrs? Unknown    Iron    Collection Time: 05/11/22 11:07 AM   Result Value Ref Range    Iron 121 35 - 180 ug/dL   GGT    Collection Time: 05/11/22 11:07 AM   Result Value Ref Range    GGT 56 0 - 75 U/L   Hemoglobin A1c    Collection Time: 05/11/22 11:07 AM   Result  Value Ref Range    Hemoglobin A1C 5.9 (H) 0.0 - 5.6 %   IgA    Collection Time: 05/11/22 11:07 AM   Result Value Ref Range    Immunoglobulin A 181 84 - 499 mg/dL   IgG    Collection Time: 05/11/22 11:07 AM   Result Value Ref Range    Immunoglobulin G 779 610 - 1,616 mg/dL   IgM    Collection Time: 05/11/22 11:07 AM   Result Value Ref Range    Immunoglobulin M 62 35 - 242 mg/dL   INR    Collection Time: 05/11/22 11:07 AM   Result Value Ref Range    INR 0.92 0.85 - 1.15   Magnesium    Collection Time: 05/11/22 11:07 AM   Result Value Ref Range    Magnesium 2.1 1.6 - 2.3 mg/dL   Phosphorus    Collection Time: 05/11/22 11:07 AM   Result Value Ref Range    Phosphorus 2.8 2.5 - 4.5 mg/dL   TSH with free T4 reflex    Collection Time: 05/11/22 11:07 AM   Result Value Ref Range    TSH 1.28 0.40 - 4.00 mU/L   Vitamin D Deficiency    Collection Time: 05/11/22 11:07 AM   Result Value Ref Range    Vitamin D, Total (25-Hydroxy) 32 20 - 75 ug/L   Erythrocyte sedimentation rate auto    Collection Time: 05/11/22 11:07 AM   Result Value Ref Range    Erythrocyte Sedimentation Rate 5 0 - 20 mm/hr   CBC with platelets and differential    Collection Time: 05/11/22 11:07 AM   Result Value Ref Range    WBC Count 5.3 4.0 - 11.0 10e3/uL    RBC Count 4.82 4.40 - 5.90 10e6/uL    Hemoglobin 14.6 13.3 - 17.7 g/dL    Hematocrit 42.3 40.0 - 53.0 %    MCV 88 78 - 100 fL    MCH 30.3 26.5 - 33.0 pg    MCHC 34.5 31.5 - 36.5 g/dL    RDW 14.6 10.0 - 15.0 %    Platelet Count 224 150 - 450 10e3/uL    % Neutrophils 58 %    % Lymphocytes 30 %    % Monocytes 10 %    % Eosinophils 1 %    % Basophils 1 %    % Immature Granulocytes 0 %    NRBCs per 100 WBC 0 <1 /100    Absolute Neutrophils 3.1 1.6 - 8.3 10e3/uL    Absolute Lymphocytes 1.6 0.8 - 5.3 10e3/uL    Absolute Monocytes 0.5 0.0 - 1.3 10e3/uL    Absolute Eosinophils 0.1 0.0 - 0.7 10e3/uL    Absolute Basophils 0.1 0.0 - 0.2 10e3/uL    Absolute Immature Granulocytes 0.0 <=0.4 10e3/uL    Absolute NRBCs 0.0  10e3/uL   Ferritin    Collection Time: 05/11/22 11:07 AM   Result Value Ref Range    Ferritin 33 26 - 388 ng/mL   Routine UA with microscopic    Collection Time: 05/11/22 11:14 AM   Result Value Ref Range    Color Urine Izzy (A) Colorless, Straw, Light Yellow, Yellow    Appearance Urine Slightly Cloudy (A) Clear    Glucose Urine Negative Negative mg/dL    Bilirubin Urine Negative Negative    Ketones Urine Negative Negative mg/dL    Specific Gravity Urine 1.020 1.003 - 1.035    Blood Urine Negative Negative    pH Urine 6.0 5.0 - 7.0    Protein Albumin Urine Negative Negative mg/dL    Urobilinogen Urine Normal Normal, 2.0 mg/dL    Nitrite Urine Negative Negative    Leukocyte Esterase Urine Negative Negative    Mucus Urine Present (A) None Seen /LPF    RBC Urine 1 <=2 /HPF    WBC Urine <1 <=5 /HPF   Albumin Random Urine Quantitative with Creat Ratio    Collection Time: 05/11/22 11:14 AM   Result Value Ref Range    Creatinine Urine mg/dL 157 mg/dL    Albumin Urine mg/L 11 mg/L    Albumin Urine mg/g Cr 7.01 0.00 - 17.00 mg/g Cr       PFT: Mild obstructive lung disease.  When compared to 7/21/2021, the FEV1 and FVC have little change.      Pulmonary exacerbation: absent    45 minutes spent on the date of the encounter doing chart review, history and exam, documentation and further activities per the note  Time documented is excluding time spent for PFT interpretation.

## 2022-05-11 NOTE — LETTER
5/11/2022         RE: Michele Altamirano  [address] [facility] [address]        Dear Colleague,    Thank you for referring your patient, Michele Altamirano, to the [facility] [place]. Please see a copy of my visit note below.    [facility]  May 11, 2022         Assessment and Plan:   Michele Altamirano is a 56 year old male with cystic fibrosis.    1. CF lung disease with mild obstruction: Michele reports that from a pulmonary point of view he is still struggling with limitations with feeling like he is hitting a wall with exhalation.  It does not feel different than previous.  He does not feel that there is anything that particularly resolves the symptoms.  Despite this he remains quite active and does exercise without difficulty.  He does feel like it is more inflamed.  Fortunately his pulmonary function tests today are stable.  He continues to show evidence of Pseudomonas in sputum.  At this time I recommended to Michele:  -- We discussed the use of other mucolytic therapies including N-acetylcysteine, Pulmozyme or continuing hypertonic saline.  We discussed the mechanism of action and the pros and cons of each.  He will continue to use his hypertonic saline at this time.  I am certainly open to a change if he is interested.  -- Michele continues to remain quite active which is always been an excellent form of bronchial drainage for him.    2. Pancreatic Insufficiency/GI: Michele has no new symptoms consistent with worsening malabsorption.    - continue the present dose of pancreatic enzymes  - continue vitamin supplementation.    3.  Reactive hypoglycemia: Michele is scheduled to see Dr. Obrien.  I defer to him for management.    4.  CF TR modulator therapy: Is on Trikafta and tolerating well.  We did obtain labs today which show a stable hepatic panel.  He did have some weight gain in the past but now is showing evidence of some  weight loss and correction to what is probably his baseline.    4.  Psychosocial: Michele continues to work full-time.  His business dose recently bought out.  He has a little bit more time available to him now that he is no longer in meetings.    5.  Annual studies labs: Michele did have his labs performed today after his visit.  I have reviewed the.  I will follow-up with him as necessary.  I will also ask our dietitian to contact him if there is any concern related to his vitamin levels.    Annual studies due: review  Immunizations: COVID booster  Colonoscopy: reviewed    Siobhan Munoz MD MPH  Associate Professor of Medicine  Pulmonary, Allergy, Critical Care and Sleep Medicine      Interval History:     Michele does report a cough some little with his saline neb.  He occasionally produces a bit of sputum.  Again when he exhales he does know this is a limitation in that.  He does premed with that.  He does do his nebulized therapy twice daily.         Review of Systems:     CONSTITUTIONAL: no fever, no chills, no sweats, no change in weight, no change in energy, no change in appetite--better than ever    INTEGUMENTARY/SKIN: no rash, no obvious new lesions    ENT/MOUTH: no sore throat, no new sinus pain, no new nasal drainage, no new nasal congestion, no ear ringing     RESPIRATORY: see interval history    CV: no chest pain, no palpitations, no peripheral edema    GI: no nausea, no vomiting, no change in stools, no fatty stools, no GERD    : negative urinary    MUSCULOSKELETAL: Some restless leg    ENDOCRINE: Due to see endocrinology    NEURO:  No headache    SLEEP: no issues--better, forcing himself to go to bed at 1030    PSYCHIATRIC: mood stable          Past Medical and Surgical History:     Past Medical History:   Diagnosis Date     CAP (community acquired pneumonia)     2011     CF (cystic fibrosis) (H)     diagnosed 1969, malnutrition, staph empyema     Chronic knee pain      Diverticular disease 2018    see  on colonoscopy     GERD (gastroesophageal reflux disease)     egd in the past     Pancreatic insufficiency      Past Surgical History:   Procedure Laterality Date     ENDOSCOPIC SINUS SURGERY      removal of mucocele behind eye, 1992     OPTICAL TRACKING SYSTEM ENDOSCOPIC SINUS SURGERY Bilateral 11/28/2016    Procedure: OPTICAL TRACKING SYSTEM ENDOSCOPIC SINUS SURGERY;  Surgeon: Harriett Cosby MD;  Location:  OR           Family History:     Family History   Problem Relation Age of Onset     Lipids Father      Cardiovascular Father      Diabetes Maternal Grandmother         type 2            Social History:     Social History     Socioeconomic History     Marital status:      Spouse name: Not on file     Number of children: 1     Years of education: Not on file     Highest education level: Not on file   Occupational History     Occupation:    Tobacco Use     Smoking status: Never Smoker     Smokeless tobacco: Never Used   Substance and Sexual Activity     Alcohol use: No     Alcohol/week: 0.0 standard drinks     Drug use: No     Sexual activity: Yes     Partners: Female     Birth control/protection: None   Other Topics Concern     Parent/sibling w/ CABG, MI or angioplasty before 65F 55M? Not Asked   Social History Narrative    8/18/2020 --Lives in [place] with his wife and 15 yo son (Raghu).  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     Social Determinants of Health     Financial Resource Strain: Not on file   Food Insecurity: Not on file   Transportation Needs: Not on file   Physical Activity: Not on file   Stress: Not on file   Social Connections: Not on file   Intimate Partner Violence: Not on file   Housing Stability: Not on file            Medications:     Current Outpatient Medications   Medication     albuterol (PROAIR HFA/PROVENTIL HFA/VENTOLIN HFA) 108 (90 Base) MCG/ACT inhaler     azithromycin (ZITHROMAX) 500 MG tablet      "aztreonam lysine (CAYSTON) 75 MG SOLR     Cholecalciferol (VITAMIN D) 2000 UNITS CAPS     fluticasone-salmeterol (ADVAIR) 250-50 MCG/DOSE inhaler     hydrochlorothiazide (MICROZIDE) 12.5 MG capsule     multivitamin CF formula (AQUADEKS) chewable tablet     nystatin (MYCOSTATIN) 340138 UNIT/ML suspension     pantoprazole (PROTONIX) 40 MG EC tablet     Norah Altera Nebulizer System [facility]     Respiratory Therapy Supplies (NORAH ALTERA NEBULIZER HANDSET) [facility]     Respiratory Therapy Supplies (NORAH ALTERA NEBULIZER HANDSET) MISC     sodium chloride inhalant 7 % NEBU neb solution     TRIKAFTA 100-50-75 & 150 MG tablet pack     ZENPEP 77182-68957 units CPEP     No current facility-administered medications for this visit.            Physical Exam:   BP (!) 149/90   Pulse 90   Ht 1.75 m (5' 8.9\")   Wt 74.7 kg (164 lb 10.9 oz)   SpO2 97%   BMI 24.39 kg/m  --Repeat blood pressure 120/84    Constitutional:   Awake, alert and in no apparent distress     Eyes:   nonicteric     ENT:   Mask in place     Neck:   Supple without supraclavicular or cervical lymphadenopathy     Lungs:   Good air flow.  No crackles. No rhonchi.  No wheezes.     Cardiovascular:   Normal S1 and S2.  RRR.  No murmur, gallop or rub.     Abdomen:   NABS, soft, nontender, nondistended.      Musculoskeletal:   No edema, digital clubbing present     Neurologic:   Alert and conversant.     Skin:   Warm, dry.  No rash on limited exam.             Data:   All laboratory and imaging data reviewed.    Cystic Fibrosis Culture  Specimen Description   Date Value Ref Range Status   02/09/2018 Sputum  Final   12/22/2017 Sputum  Final   01/12/2016 Sputum  Final   01/12/2016 Sputum  Final   01/12/2016 Sputum  Final    Culture Micro   Date Value Ref Range Status   02/09/2018 Moderate growth  Normal tessa    Final   02/09/2018 (A)  Final    Moderate growth  Pseudomonas aeruginosa, mucoid strain     02/09/2018 Moderate growth  Pseudomonas aeruginosa   (A)  Final   02/09/2018 " Light growth  Staphylococcus aureus   (A)  Final        Recent Results (from the past 168 hour(s))   General PFT Lab (Please always keep checked)    Collection Time: 05/11/22  8:36 AM   Result Value Ref Range    FVC-Pred 4.65 L    FVC-Pre 4.03 L    FVC-%Pred-Pre 86 %    FEV1-Pre 2.78 L    FEV1-%Pred-Pre 76 %    FEV1FVC-Pred 78 %    FEV1FVC-Pre 69 %    FEFMax-Pred 9.31 L/sec    FEFMax-Pre 9.08 L/sec    FEFMax-%Pred-Pre 97 %    FEF2575-Pred 3.20 L/sec    FEF2575-Pre 1.51 L/sec    XMV6803-%Pred-Pre 47 %    ExpTime-Pre 11.77 sec    FIFMax-Pre 7.04 L/sec    FEV1FEV6-Pred 80 %    FEV1FEV6-Pre 73 %   Iron & Iron Binding Capacity    Collection Time: 05/11/22 10:37 AM   Result Value Ref Range    Iron 123 35 - 180 ug/dL    Iron Binding Capacity 412 240 - 430 ug/dL    Iron Sat Index 30 15 - 46 %   Hepatic panel (Albumin, ALT, AST, Bili, Alk Phos, TP)    Collection Time: 05/11/22 11:07 AM   Result Value Ref Range    Bilirubin Total 1.1 0.2 - 1.3 mg/dL    Bilirubin Direct 0.3 (H) 0.0 - 0.2 mg/dL    Protein Total 6.9 6.8 - 8.8 g/dL    Albumin 3.8 3.4 - 5.0 g/dL    Alkaline Phosphatase 78 40 - 150 U/L    AST 31 0 - 45 U/L    ALT 46 0 - 70 U/L   CK total    Collection Time: 05/11/22 11:07 AM   Result Value Ref Range     30 - 300 U/L   Basic metabolic panel    Collection Time: 05/11/22 11:07 AM   Result Value Ref Range    Sodium 144 133 - 144 mmol/L    Potassium 3.9 3.4 - 5.3 mmol/L    Chloride 106 94 - 109 mmol/L    Carbon Dioxide (CO2) 31 20 - 32 mmol/L    Anion Gap 7 3 - 14 mmol/L    Urea Nitrogen 22 7 - 30 mg/dL    Creatinine 0.90 0.66 - 1.25 mg/dL    Calcium 9.2 8.5 - 10.1 mg/dL    Glucose 88 70 - 99 mg/dL    GFR Estimate >90 >60 mL/min/1.73m2   Lipid Profile    Collection Time: 05/11/22 11:07 AM   Result Value Ref Range    Cholesterol 173 <200 mg/dL    Triglycerides 109 <150 mg/dL    Direct Measure HDL 63 >=40 mg/dL    LDL Cholesterol Calculated 88 <=100 mg/dL    Non HDL Cholesterol 110 <130 mg/dL    Patient Fasting >  8hrs? Unknown    Iron    Collection Time: 05/11/22 11:07 AM   Result Value Ref Range    Iron 121 35 - 180 ug/dL   GGT    Collection Time: 05/11/22 11:07 AM   Result Value Ref Range    GGT 56 0 - 75 U/L   Hemoglobin A1c    Collection Time: 05/11/22 11:07 AM   Result Value Ref Range    Hemoglobin A1C 5.9 (H) 0.0 - 5.6 %   IgA    Collection Time: 05/11/22 11:07 AM   Result Value Ref Range    Immunoglobulin A 181 84 - 499 mg/dL   IgG    Collection Time: 05/11/22 11:07 AM   Result Value Ref Range    Immunoglobulin G 779 610 - 1,616 mg/dL   IgM    Collection Time: 05/11/22 11:07 AM   Result Value Ref Range    Immunoglobulin M 62 35 - 242 mg/dL   INR    Collection Time: 05/11/22 11:07 AM   Result Value Ref Range    INR 0.92 0.85 - 1.15   Magnesium    Collection Time: 05/11/22 11:07 AM   Result Value Ref Range    Magnesium 2.1 1.6 - 2.3 mg/dL   Phosphorus    Collection Time: 05/11/22 11:07 AM   Result Value Ref Range    Phosphorus 2.8 2.5 - 4.5 mg/dL   TSH with free T4 reflex    Collection Time: 05/11/22 11:07 AM   Result Value Ref Range    TSH 1.28 0.40 - 4.00 mU/L   Vitamin D Deficiency    Collection Time: 05/11/22 11:07 AM   Result Value Ref Range    Vitamin D, Total (25-Hydroxy) 32 20 - 75 ug/L   Erythrocyte sedimentation rate auto    Collection Time: 05/11/22 11:07 AM   Result Value Ref Range    Erythrocyte Sedimentation Rate 5 0 - 20 mm/hr   CBC with platelets and differential    Collection Time: 05/11/22 11:07 AM   Result Value Ref Range    WBC Count 5.3 4.0 - 11.0 10e3/uL    RBC Count 4.82 4.40 - 5.90 10e6/uL    Hemoglobin 14.6 13.3 - 17.7 g/dL    Hematocrit 42.3 40.0 - 53.0 %    MCV 88 78 - 100 fL    MCH 30.3 26.5 - 33.0 pg    MCHC 34.5 31.5 - 36.5 g/dL    RDW 14.6 10.0 - 15.0 %    Platelet Count 224 150 - 450 10e3/uL    % Neutrophils 58 %    % Lymphocytes 30 %    % Monocytes 10 %    % Eosinophils 1 %    % Basophils 1 %    % Immature Granulocytes 0 %    NRBCs per 100 WBC 0 <1 /100    Absolute Neutrophils 3.1 1.6 -  8.3 10e3/uL    Absolute Lymphocytes 1.6 0.8 - 5.3 10e3/uL    Absolute Monocytes 0.5 0.0 - 1.3 10e3/uL    Absolute Eosinophils 0.1 0.0 - 0.7 10e3/uL    Absolute Basophils 0.1 0.0 - 0.2 10e3/uL    Absolute Immature Granulocytes 0.0 <=0.4 10e3/uL    Absolute NRBCs 0.0 10e3/uL   Ferritin    Collection Time: 05/11/22 11:07 AM   Result Value Ref Range    Ferritin 33 26 - 388 ng/mL   Routine UA with microscopic    Collection Time: 05/11/22 11:14 AM   Result Value Ref Range    Color Urine Izzy (A) Colorless, Straw, Light Yellow, Yellow    Appearance Urine Slightly Cloudy (A) Clear    Glucose Urine Negative Negative mg/dL    Bilirubin Urine Negative Negative    Ketones Urine Negative Negative mg/dL    Specific Gravity Urine 1.020 1.003 - 1.035    Blood Urine Negative Negative    pH Urine 6.0 5.0 - 7.0    Protein Albumin Urine Negative Negative mg/dL    Urobilinogen Urine Normal Normal, 2.0 mg/dL    Nitrite Urine Negative Negative    Leukocyte Esterase Urine Negative Negative    Mucus Urine Present (A) None Seen /LPF    RBC Urine 1 <=2 /HPF    WBC Urine <1 <=5 /HPF   Albumin Random Urine Quantitative with Creat Ratio    Collection Time: 05/11/22 11:14 AM   Result Value Ref Range    Creatinine Urine mg/dL 157 mg/dL    Albumin Urine mg/L 11 mg/L    Albumin Urine mg/g Cr 7.01 0.00 - 17.00 mg/g Cr       PFT: Mild obstructive lung disease.  When compared to 7/21/2021, the FEV1 and FVC have little change.      Pulmonary exacerbation: absent    45 minutes spent on the date of the encounter doing chart review, history and exam, documentation and further activities per the note  Time documented is excluding time spent for PFT interpretation.        Again, thank you for allowing me to participate in the care of your patient.        Sincerely,        Siobhan Munoz MD

## 2022-05-11 NOTE — PATIENT INSTRUCTIONS
Cystic Fibrosis Self-Care Plan    RECOMMENDATIONS:   Michele,  It was great to see you today.  The plan from today:  --stick with the advair for another month  --labs today  --stick with hypertonic saline  Great job with self cares!    YOUR GOAL:  Stay safe and well.  Enjoy the spring weather!      [facility] Nurse line:  ESPERANZA Corral heraclio Barney  [phone]     [facility] Fax Number:      [phone]         Cystic Fibrosis Respiratory Therapists:   Alyse Cheek              [phone]          Consuelo Bonilla   [phone]  Cystic Fibrosis Dietitians:              Willa Romero              [phone]                            Alondra Vitale                        [phone]   Cystic Fibrosis Diabetes Nurse:    Livia Mondragon   [phone]    Cystic Fibrosis Social Workers:     Kaykay Vieyra               [phone]                     Rayna Díaz               [phone]  Cystic Fibrosis Pharmacists:           Jael Arvizu                               [phone]         Liset Cee      [phone]   Cystic Fibrosis Genetic Counselor:   Charisse Reveles    [phone]    [facility] website:  [email or web address]    COVID VACCINES:    You are eligible for the COVID-19 vaccine. Sign up for your COVID vaccine via [employer]. Log in, select the menu bar, select schedule an appointment, and then select COVID-19 Vaccine 1st Dose. You may also schedule by calling this number [phone] however hold times can be long.       OR schedule through the [facility] of Health Vaccine Connector at https://vaccineconnector.[place].gov/ or by calling [phone].      The best vaccine is the one that s available to you first.  All COVID-19 vaccines currently available in the [place] (Sam & Sam, Pfizer and Moderna) have been shown to be highly effect at preventing COVID-19.       We re still learning how vaccines will affect the spread of  COVID-19. After you ve been fully vaccinated against COVID-19, you should keep taking precautions in public places like wearing a mask, staying 6 feet apart from others, and avoiding crowds and poorly ventilated spaces until we know more.       MRN: [id number]   Clinic Date: May 11, 2022   Patient: Michele Altamirano     Annual Studies:   IGG   Date Value Ref Range Status   02/09/2018 883 695 - 1,620 mg/dL Final     Insulin   Date Value Ref Range Status   02/09/2018 36.2 (H) 3 - 25 mU/L Final     Comment:     Starting 7/13/2017, insulin results will decrease by approximately 37%   compared to insulin results reported in EPIC between (12/16/2016-7/12/2017),   and should be interpreted accordingly. Insulin results reported prior to   12/16/16 are comparable to current insulin results and therefore no adjustment   is needed.       There are no preventive care reminders to display for this patient.    Pulmonary Function Tests  FEV1: amount of air you can blow out in 1 second  FVC: total amount of air you can take in and blow out    Your Goals:         PFT Latest Ref Rng & Units 7/21/2021   FVC L 3.94   FEV1 L 2.73   FVC% % 84   FEV1% % 74          Airway Clearance: The Most Important Way to Keep Your Lungs Healthy  Vest Settings:   Hill-Rom Frequencies: 8, 9, 10 Pressure 10 Then, Frequencies 18, 19, 20 Pressure 6     RespirTech: Quick Start with Pressure of     Do each frequency for 5 minutes; Deflate vest after each frequency & cough 3 times before beginning the next setting.    Vest and Neb Therapy should be done 1 times/day.    Good Nutrition Can Improve Lung Function and Overall Health    Take ALL of your vitamins with food    Take 1/2 of your enzymes before EVERY meal/snack and the other 1/2 mid-meal/snack    Wt Readings from Last 3 Encounters:   02/22/22 77.9 kg (171 lb 11.8 oz)   07/21/21 75 kg (165 lb 5.5 oz)   06/25/21 74.5 kg (164 lb 3.9 oz)       There is no height or weight on file to calculate BMI.          National CF Foundation Recommendations for BMI in CF Adults: Women: at least 22 Men: at least 23        Controlling Blood Sugars Helps Prevent Lung Infections & Improves Nutrition  Test blood sugar:    In the morning before eating (goal is )    2 hours after a meal (goal is less than 150)    When pre-meal glucose is greater than 150 add correction    At bedtime (if less than 100 eat a snack with 15 grams of carbohydrates  Last A1C Results:   Hemoglobin A1C POCT   Date Value Ref Range Status   11/03/2020 6.1 (A) 0 - 5.6 % Final         If diabetic, measure A1C every 6 months. Goal: Under 7%    Staying Healthy  Research:  If you are interested in learning about research opportunities or have questions, please contact the CF Research Team at [phone] or [email or web address].    CF Foundation:  Compass is a personalized resource service to help you with the insurance, financial, legal and other issues you are facing.  It's free, confidential and available to anyone with CF.  Ask your  for more information or contact Compass directly at [phone]-UXRVHTO ([phone]) or compass@cff.org, or learn more at cff.org/compass.

## 2022-05-12 LAB
SARS-COV-2 AB SERPL IA-ACNC: >250 U/ML
SARS-COV-2 AB SERPL QL IA: NEGATIVE
SARS-COV-2 AB SERPL-IMP: POSITIVE

## 2022-05-13 LAB — TESTOST SERPL-MCNC: 537 NG/DL (ref 240–950)

## 2022-05-14 LAB
A-TOCOPHEROL VIT E SERPL-MCNC: 14.7 MG/L
ANNOTATION COMMENT IMP: NORMAL
BETA+GAMMA TOCOPHEROL SERPL-MCNC: 0.4 MG/L
IGE SERPL-ACNC: 15 KU/L (ref 0–114)
RETINYL PALMITATE SERPL-MCNC: 0.04 MG/L
VIT A SERPL-MCNC: 0.53 MG/L

## 2022-05-16 ENCOUNTER — TELEPHONE (OUTPATIENT)
Dept: PULMONOLOGY | Facility: CLINIC | Age: 57
End: 2022-05-16
Payer: COMMERCIAL

## 2022-05-16 LAB
BACTERIA SPEC CULT: ABNORMAL
BACTERIA SPEC CULT: ABNORMAL

## 2022-05-16 NOTE — TELEPHONE ENCOUNTER
Spoke with pt, pt informed he will schedule the 3 month follow up and pfts on line or if he has trouble he informed he has the call center scheduling number and will get assistance from the call center if needed.

## 2022-07-12 DIAGNOSIS — E84.9 CF (CYSTIC FIBROSIS) (H): ICD-10-CM

## 2022-07-13 RX ORDER — ELEXACAFTOR, TEZACAFTOR, AND IVACAFTOR 100-50-75
KIT ORAL
Qty: 84 TABLET | Refills: 3 | Status: SHIPPED | OUTPATIENT
Start: 2022-07-13 | End: 2022-11-01

## 2022-07-26 ENCOUNTER — TELEPHONE (OUTPATIENT)
Dept: PULMONOLOGY | Facility: CLINIC | Age: 57
End: 2022-07-26

## 2022-07-26 NOTE — TELEPHONE ENCOUNTER
Prior Authorization Approval    Authorization Effective Date: 7/25/2022  Authorization Expiration Date: 7/25/2023  Medication: Trikafta - approval  Approved Dose/Quantity: 84  Reference #: [id number]   Insurance Company: [employer] - Phone [phone] Fax [phone]  Expected CoPay:       CoPay Card Available:      Foundation Assistance Needed:    Which Pharmacy is filling the prescription (Not needed for infusion/clinic administered): [facility] - [place] [address]  Pharmacy Notified: Yes  Patient Notified: Yes

## 2022-08-23 ENCOUNTER — CLINICAL UPDATE (OUTPATIENT)
Dept: PHARMACY | Facility: CLINIC | Age: 57
End: 2022-08-23
Payer: COMMERCIAL

## 2022-08-23 DIAGNOSIS — E84.9 CYSTIC FIBROSIS (H): Primary | ICD-10-CM

## 2022-08-23 PROCEDURE — 99207 PR NO CHARGE LOS: CPT | Performed by: PHARMACIST

## 2022-08-23 NOTE — PROGRESS NOTES
Clinical Update:                                                    A chart review was conducted for Michele Altamirano.    Reason for Chart Review: Trikafta Annual Lab Monitoring     Discussion: Michele has been on Trikafta since 7/20/20. Per chart review, Michele continues full dose Trikafta.    Labs were reviewed from 5/11/22 at [facility] . bilirubin is elevated at 1.5 x the upper limit of normal, but total bilirubin is within normal limits, so will continue to monitor with next annual labs. All other labs are within normal limits.      Lab Results   Component Value Date    ALT 46 05/11/2022    AST 31 05/11/2022    BILITOTAL 1.1 05/11/2022    DBIL 0.3 (H) 05/11/2022     05/11/2022     Plan:  1. Continue Trikafta   2. Recheck hepatic panel and CK in 1 year or with next annual labs        Lachelle Mojica, PharmD   Medication Therapy Management   Cystic Fibrosis Pharmacist

## 2022-09-18 DIAGNOSIS — E55.9 VITAMIN D DEFICIENCY: ICD-10-CM

## 2022-09-18 DIAGNOSIS — K21.9 GASTROESOPHAGEAL REFLUX DISEASE WITHOUT ESOPHAGITIS: ICD-10-CM

## 2022-09-18 DIAGNOSIS — K86.89 PANCREATIC INSUFFICIENCY: ICD-10-CM

## 2022-09-18 DIAGNOSIS — E84.0 CYSTIC FIBROSIS WITH PULMONARY MANIFESTATIONS (H): ICD-10-CM

## 2022-09-19 RX ORDER — PANTOPRAZOLE SODIUM 40 MG/1
TABLET, DELAYED RELEASE ORAL
Qty: 180 TABLET | Refills: 3 | Status: SHIPPED | OUTPATIENT
Start: 2022-09-19 | End: 2023-03-20

## 2022-11-01 DIAGNOSIS — E84.9 CF (CYSTIC FIBROSIS) (H): ICD-10-CM

## 2022-11-01 RX ORDER — ELEXACAFTOR, TEZACAFTOR, AND IVACAFTOR 100-50-75
KIT ORAL
Qty: 84 TABLET | Refills: 0 | Status: SHIPPED | OUTPATIENT
Start: 2022-11-01 | End: 2022-11-28

## 2022-11-02 ENCOUNTER — PHARMACY VISIT (OUTPATIENT)
Dept: ADMINISTRATIVE | Facility: CLINIC | Age: 57
End: 2022-11-02

## 2022-11-02 NOTE — PROGRESS NOTES
Cystic Fibrosis Clinical Follow Up Assessment   Assessment Link -Cystic Fibrosis Clinical Follow Up Assessment     2022/11/02 19:48:28 [facility], by Zofia Tejeda        Activity Date 2022/11/02     Care Details    What are the patient's goals for this therapy?   ? Trikafta exceeded his expectations in it's ability to reduce inflammation and phlegm production.- 1/4/2020; 4/22/22 - pt did not answer goals question      Did you identify any patient barriers to access and successful treatment?   ? No barriers to access identified      Is it appropriate to collect a PDC at this time? [QA Metric] (An MPR or PDC would not be appropriate for cycled medications or if the patient is on therapy   ? Yes      Document the date of the last refill of PDC   ? 2022-10-04      Document PDC   ? 1      Has the patient missed doses inappropriately?   ? No      Please select CURRENT side effect(s) for monitoring:   ? None          Summary Notes   I had the pleasure of speaking to pt for TM via text. States everything is going great with the Trikafta. No side effects. Doses: States he is batting nearly a 1,000 in remembering morning and evening doses. States it is very routine. PDC= 1.0. No health, allergy or med changes. No questions or concerns.   - Will continue biannual TM      Tri TEJEDA, PharmD, CSP  Therapy Management Pharmacist  [facility]   [address] [place] [id number]   [email or web address]  [email or web address]   Specialty: [phone]  Mail Order: [phone]

## 2022-11-20 NOTE — PROGRESS NOTES
[facility]  November 22, 2022         Assessment and Plan:   Michele Altamirano is a 57 year old male with cystic fibrosis.    1. 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  He has very little in the way of cough or sputum production.  His activity tolerance remains good.  He has not been his active as he has previously and does feel that he is a little bit more weak and deconditioned.  He historically has grown out Pseudomonas in his sputum.  He did not perform pulmonary function tests today.  At this time I have recommended to Michele:  -- He can stop his inhalational hypertonic saline.  We did discuss the results of the Simplify study.  -- Given that he is inconsistent with the use of his [place] I have asked him to discontinue it.  We will use it at the time that he may need it for an exacerbation.  -- I have asked him to consider doing more consistent exercise as this form of bronchial drainage therapy.  He historically has not been consistent with vest therapy.    2. Pancreatic Insufficiency/GI: Michele has no new symptoms consistent with worsening malabsorption.  He does have a history of reflux disease that he reports is well controlled at this time.  - continue the present dose of pancreatic enzymes  - continue vitamin supplementation.    3.  CF TR modulator therapy: Michele is on Trikafta and feeling well.  He has last performed hepatic panel in May of this year.    4.  Left inguinal infected lymph node: Michele reports that a few weeks ago he noticed a small nodule in his left inguinal lymph node.  In the last few days it has become enlarged red and painful.  I did examine this but did not palpate it because it was quite inflamed.  I have asked him to go immediately to his primary care clinic to have this evaluated.  I did offer him care in our clinic today through our primary care or  walk-in clinic.  He would rather get his care closer to home regarding this.  He did report to me that he will immediately drive there upon leaving us today.  I have also asked him to follow-up with us when he knows how this is going to be treated.    Follow up:  Patient evaluated at his primary care clinic on 11/22.  Underwent incision and drainage of abscess and was place on antibiotics.    5.  Psychosocial: Michele does continue to work full-time.  His wife Sherie is retiring.  He will have to change insurance in January.  I did ask him to contact us and we will work with him for his changes in pharmacy for his prescriptions.  He reports his mood has fine.    6.  History of abnormal glucose tolerance: Michele is  interested in being seen in endocrinology.  He has canceled a few appointments in the past.  He is committed to getting back on track with this.    Annual studies due: 5/2023  Immunizations: TDAP  Colonoscopy: 10/2023    Siobhan Munoz MD MPH  Associate Professor of Medicine  Pulmonary, Allergy, Critical Care and Sleep Medicine      Interval History:     Michele reports little in the way of cough or sputum production.  He denies any chest congestion.  He is not short of breath with his activities of daily living.  He is doing a bit of traveling for work and is tolerated this without difficulty.  Again he is concerned that he might be deconditioned and is working on some weight training.  I have also encouraged him to increase his cardio exercise as well.         Review of Systems:     CONSTITUTIONAL: no fever, no chills, no sweats, no change in weight, no change in energy, no change in appetite fine--    INTEGUMENTARY/SKIN: Please see above for lymphadenopathy and infection    ENT/MOUTH: no sore throat, no new sinus pain, no new nasal drainage, no new nasal congestion, no ear ringing     RESPIRATORY: see interval history    CV: no chest pain, no palpitations, no peripheral edema    GI: no nausea, no vomiting,  no change in stools, no fatty stools, no GERD    : negative urinary, recent epididymitis that was worked up    MUSCULOSKELETAL: Recent neck discomfort that is now resolved    ENDOCRINE: Negative    NEURO: Musculoskeletal headaches    SLEEP: Disruptive overnight because of the lymph node    PSYCHIATRIC: mood stable--fine          Past Medical and Surgical History:     Past Medical History:   Diagnosis Date     CAP (community acquired pneumonia)     2011     CF (cystic fibrosis) (H)     diagnosed 1969, malnutrition, staph empyema     Chronic knee pain      Diverticular disease 2018    see on colonoscopy     GERD (gastroesophageal reflux disease)     egd in the past     Pancreatic insufficiency      Past Surgical History:   Procedure Laterality Date     ENDOSCOPIC SINUS SURGERY      removal of mucocele behind eye, 1992     OPTICAL TRACKING SYSTEM ENDOSCOPIC SINUS SURGERY Bilateral 11/28/2016    Procedure: OPTICAL TRACKING SYSTEM ENDOSCOPIC SINUS SURGERY;  Surgeon: Harriett Cosby MD;  Location:  OR           Family History:     Family History   Problem Relation Age of Onset     Lipids Father      Cardiovascular Father      Diabetes Maternal Grandmother         type 2            Social History:     Social History     Socioeconomic History     Marital status:      Spouse name: Not on file     Number of children: 1     Years of education: Not on file     Highest education level: Not on file   Occupational History     Occupation:    Tobacco Use     Smoking status: Never     Smokeless tobacco: Never   Substance and Sexual Activity     Alcohol use: No     Alcohol/week: 0.0 standard drinks     Drug use: No     Sexual activity: Yes     Partners: Female     Birth control/protection: None   Other Topics Concern     Parent/sibling w/ CABG, MI or angioplasty before 65F 55M? Not Asked   Social History Narrative    8/18/2020 --Lives in [place] with his wife and 15 yo son (Raghu).  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     Social Determinants of Health     Financial Resource Strain: Not on file   Food Insecurity: Not on file   Transportation Needs: Not on file   Physical Activity: Not on file   Stress: Not on file   Social Connections: Not on file   Intimate Partner Violence: Not on file   Housing Stability: Not on file            Medications:     Current Outpatient Medications   Medication     albuterol (PROAIR HFA/PROVENTIL HFA/VENTOLIN HFA) 108 (90 Base) MCG/ACT inhaler     azithromycin (ZITHROMAX) 500 MG tablet     aztreonam lysine (CAYSTON) 75 MG SOLR     Cholecalciferol (VITAMIN D) 2000 UNITS CAPS     elexacaftor-tezacaftor-ivacaftor & ivacaftor (TRIKAFTA) 100-50-75 & 150 MG tablet pack     hydrochlorothiazide (MICROZIDE) 12.5 MG capsule     multivitamin CF formula (AQUADEKS) chewable tablet     pantoprazole (PROTONIX) 40 MG EC tablet     [facility]ah [facility] Nebulizer System [facility]     Respiratory Therapy Supplies (NORAH ALTERA NEBULIZER HANDSET) [facility]     Respiratory Therapy Supplies (NORAH ALTERA NEBULIZER HANDSET) MISC     ZENPEP 34976-38221 units CPEP     sodium chloride inhalant 7 % NEBU neb solution     No current facility-administered medications for this visit.            Physical Exam:   /83   Pulse 71   Resp 17   Ht 1.75 m (5' 8.9\")   Wt 77.1 kg (170 lb)   SpO2 98%   BMI 25.18 kg/m      Constitutional:   Awake, alert and in no apparent distress     Eyes:   nonicteric     ENT:   Mask in place     Neck:   Supple without supraclavicular or cervical lymphadenopathy     Lungs:   Good air flow.  No crackles. No rhonchi.  No wheezes.     Cardiovascular:   Normal S1 and S2.  RRR.  No murmur, gallop or rub.     Abdomen:   NABS, soft, nontender, nondistended.      Musculoskeletal:   No edema, digital clubbing present     Neurologic:   Alert and conversant.     Skin:   Warm, dry.  Visible left inguinal lymphadenopathy with surrounding erythema that is " probably about 6 x 8 cm, evidence of some dry serous drainage            Data:   All laboratory and imaging data reviewed.    Cystic Fibrosis Culture  Specimen Description   Date Value Ref Range Status   02/09/2018 Sputum  Final   12/22/2017 Sputum  Final   01/12/2016 Sputum  Final   01/12/2016 Sputum  Final   01/12/2016 Sputum  Final    Culture Micro   Date Value Ref Range Status   02/09/2018 Moderate growth  Normal tessa    Final   02/09/2018 (A)  Final    Moderate growth  Pseudomonas aeruginosa, mucoid strain     02/09/2018 Moderate growth  Pseudomonas aeruginosa   (A)  Final   02/09/2018 Light growth  Staphylococcus aureus   (A)  Final        No results found for this or any previous visit (from the past 168 hour(s)).    PFT: Not performed today.    Pulmonary exacerbation: absent    55 minutes spent on the date of the encounter doing chart review, history and exam, documentation and further activities per the note  Time documented is excluding time spent for PFT interpretation.

## 2022-11-20 NOTE — PATIENT INSTRUCTIONS
Cystic Fibrosis Self-Care Plan    RECOMMENDATIONS:   Help us provide the best possible care. If you receive a questionnaire from the CF Foundation about your clinic experience today please fill it out.  It should take less than 5 minutes. Let us know what we are doing well and how we can improve.  Michele, It was great to see you today.  The plan from today:  --go straight to your clinic for the lymph node  --stop hypertonic saline  --stop cayston  --exercise!  --appointment with Dr. Obrien  Please take care of yourself!    YOUR GOAL:  Stay safe and well.  Enjoy the holidays!    Suzie Raya cystic fibrosis office scheduling [phone]  [place] [facility] Nurse line:  Jodi Corral  [phone]     [facility] Fax Number:      [phone]         Cystic Fibrosis Respiratory Therapists:   Yaz Barragan                          [phone]          Consuelo Bonilla   [phone]  Cystic Fibrosis Dietitians:              Willa Romero              [phone]                            Alondra Vitale                        [phone]   Cystic Fibrosis Diabetes Nurse:    Livia Mondragon               [phone]    Cystic Fibrosis Social Workers:     Kaykay Vieyra               [phone]                     Rayna Díaz               [phone]  Cystic Fibrosis Pharmacists:           Lachelle Mojica                              [phone] (pager)         Liset Cee      [phone]   Cystic Fibrosis Genetic Counselor:   Charisse Reveles    [phone]    [place] [facility] website:  [email or web address]    COVID VACCINES:    You are eligible for the COVID-19 vaccine. Sign up for your COVID vaccine via [facility]. Log in, select the menu bar, select schedule an appointment, and then select COVID-19 Vaccine 1st Dose. You may also schedule by calling this number [phone] however hold times can be long.       OR schedule through the [facility]  Vaccine Connector at https://vaccineconnector.[place].gov/ or by calling [phone].      The best vaccine is the one that s available to you first.  All COVID-19 vaccines currently available in the [place] (Sam & Sam, Pfizer and Moderna) have been shown to be highly effect at preventing COVID-19.       We re still learning how vaccines will affect the spread of COVID-19. After you ve been fully vaccinated against COVID-19, you should keep taking precautions in public places like wearing a mask, staying 6 feet apart from others, and avoiding crowds and poorly ventilated spaces until we know more.       MRN: [id number]   Clinic Date: November 20, 2022   Patient: Michele Altamirano     Annual Studies:   IGG   Date Value Ref Range Status   02/09/2018 883 695 - 1,620 mg/dL Final     Immunoglobulin G   Date Value Ref Range Status   05/11/2022 779 610 - 1,616 mg/dL Final     Insulin   Date Value Ref Range Status   02/09/2018 36.2 (H) 3 - 25 mU/L Final     Comment:     Starting 7/13/2017, insulin results will decrease by approximately 37%   compared to insulin results reported in EPIC between (12/16/2016-7/12/2017),   and should be interpreted accordingly. Insulin results reported prior to   12/16/16 are comparable to current insulin results and therefore no adjustment   is needed.       There are no preventive care reminders to display for this patient.    Pulmonary Function Tests  FEV1: amount of air you can blow out in 1 second  FVC: total amount of air you can take in and blow out    Your Goals:         PFT Latest Ref Rng & Units 5/11/2022   FVC L 4.03   FEV1 L 2.78   FVC% % 86   FEV1% % 76          Airway Clearance: The Most Important Way to Keep Your Lungs Healthy  Vest Settings:   Hill-Rom Frequencies: 8, 9, 10 Pressure 10 Then, Frequencies 18, 19, 20 Pressure 6     RespirTech: Quick Start with Pressure of     Do each frequency for 5 minutes; Deflate vest after each frequency & cough 3 times before  beginning the next setting.    Vest and Neb Therapy should be done 1 times/day.    Good Nutrition Can Improve Lung Function and Overall Health    Take ALL of your vitamins with food    Take 1/2 of your enzymes before EVERY meal/snack and the other 1/2 mid-meal/snack    Wt Readings from Last 3 Encounters:   05/11/22 74.7 kg (164 lb 10.9 oz)   02/22/22 77.9 kg (171 lb 11.8 oz)   07/21/21 75 kg (165 lb 5.5 oz)       There is no height or weight on file to calculate BMI.         National CF Foundation Recommendations for BMI in CF Adults: Women: at least 22 Men: at least 23        Controlling Blood Sugars Helps Prevent Lung Infections & Improves Nutrition  Test blood sugar:    In the morning before eating (goal is )    2 hours after a meal (goal is less than 150)    When pre-meal glucose is greater than 150 add correction    At bedtime (if less than 100 eat a snack with 15 grams of carbohydrates  Last A1C Results:   Hemoglobin A1C   Date Value Ref Range Status   05/11/2022 5.9 (H) 0.0 - 5.6 % Final     Comment:     Normal <5.7%   Prediabetes 5.7-6.4%    Diabetes 6.5% or higher     Note: Adopted from ADA consensus guidelines.   11/03/2020 6.1 (A) 0 - 5.6 % Final         If diabetic, measure A1C every 6 months. Goal: Under 7%    Staying Healthy  Research:  If you are interested in learning about research opportunities or have questions, please contact the CF Research Team at [phone] or [email or web address].    CF Foundation:  Compass is a personalized resource service to help you with the insurance, financial, legal and other issues you are facing.  It's free, confidential and available to anyone with CF.  Ask your  for more information or contact Compass directly at 269-SOFWXWN ([phone]) or compass@cff.org, or learn more at cff.org/compass.

## 2022-11-21 DIAGNOSIS — E84.9 CYSTIC FIBROSIS (H): ICD-10-CM

## 2022-11-21 RX ORDER — PANCRELIPASE LIPASE, PANCRELIPASE PROTEASE, PANCRELIPASE AMYLASE 20000; 63000; 84000 [USP'U]/1; [USP'U]/1; [USP'U]/1
CAPSULE, DELAYED RELEASE ORAL
Qty: 2900 CAPSULE | Refills: 3 | Status: SHIPPED | OUTPATIENT
Start: 2022-11-21 | End: 2023-01-09

## 2022-11-22 ENCOUNTER — OFFICE VISIT (OUTPATIENT)
Dept: PULMONOLOGY | Facility: CLINIC | Age: 57
End: 2022-11-22
Attending: INTERNAL MEDICINE
Payer: COMMERCIAL

## 2022-11-22 VITALS
SYSTOLIC BLOOD PRESSURE: 139 MMHG | RESPIRATION RATE: 17 BRPM | HEIGHT: 69 IN | BODY MASS INDEX: 25.18 KG/M2 | WEIGHT: 170 LBS | OXYGEN SATURATION: 98 % | HEART RATE: 71 BPM | DIASTOLIC BLOOD PRESSURE: 83 MMHG

## 2022-11-22 DIAGNOSIS — E84.0 CYSTIC FIBROSIS WITH PULMONARY MANIFESTATIONS (H): Primary | ICD-10-CM

## 2022-11-22 PROCEDURE — 99215 OFFICE O/P EST HI 40 MIN: CPT | Performed by: INTERNAL MEDICINE

## 2022-11-22 PROCEDURE — G0463 HOSPITAL OUTPT CLINIC VISIT: HCPCS

## 2022-11-22 PROCEDURE — 87077 CULTURE AEROBIC IDENTIFY: CPT | Performed by: INTERNAL MEDICINE

## 2022-11-22 ASSESSMENT — PAIN SCALES - GENERAL: PAINLEVEL: NO PAIN (0)

## 2022-11-22 NOTE — NURSING NOTE
"Michele Altamirano is a 57 year old year old who is being seen for RECHECK (Return Cystic Fibrosis )      Medications reviewed and Vital signs taken.    Specimen Collection Type: Throat Swab    Order(s) placed: CF Aerobic Bacterial    *IF AFB order placed - please enter \"PRIORITIZE AFB\" to order comments.       No results found for: ACIDFAST      Lab Results   Component Value Date    AFBSMS  01/12/2016     Negative for acid fast bacteria  Less than 5ml of specimen received.  A minimum of 5 mL of sputum or fluid is recommended for recovery of acid fast   bacilli (AFB).  Volumes less than 5 mL are suboptimal and may compromise   recovery of AFB from culture.  Assayed at [facility] [address]           Medications reviewed and vital signs taken.   Jas Lanier CMA    "

## 2022-11-22 NOTE — LETTER
11/22/2022         RE: Michele Altamirano  [address] [address] [address]        Dear Colleague,    Thank you for referring your patient, Michele Altamirano, to the [facility] [place]. Please see a copy of my visit note below.    [facility]  November 22, 2022         Assessment and Plan:   Michele Altamirano is a 57 year old male with cystic fibrosis.    1. 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  He has very little in the way of cough or sputum production.  His activity tolerance remains good.  He has not been his active as he has previously and does feel that he is a little bit more weak and deconditioned.  He historically has grown out Pseudomonas in his sputum.  He did not perform pulmonary function tests today.  At this time I have recommended to Michele:  -- He can stop his inhalational hypertonic saline.  We did discuss the results of the Simplify study.  -- Given that he is inconsistent with the use of his [place] I have asked him to discontinue it.  We will use it at the time that he may need it for an exacerbation.  -- I have asked him to consider doing more consistent exercise as this form of bronchial drainage therapy.  He historically has not been consistent with vest therapy.    2. Pancreatic Insufficiency/GI: Michele has no new symptoms consistent with worsening malabsorption.  He does have a history of reflux disease that he reports is well controlled at this time.  - continue the present dose of pancreatic enzymes  - continue vitamin supplementation.    3.  CF TR modulator therapy: Michele is on Trikafta and feeling well.  He has last performed hepatic panel in May of this year.    4.  Left inguinal infected lymph node: Michele reports that a few weeks ago he noticed a small nodule in his left inguinal lymph node.  In the last few days  it has become enlarged red and painful.  I did examine this but did not palpate it because it was quite inflamed.  I have asked him to go immediately to his primary care clinic to have this evaluated.  I did offer him care in our clinic today through our primary care or walk-in clinic.  He would rather get his care closer to home regarding this.  He did report to me that he will immediately drive there upon leaving us today.  I have also asked him to follow-up with us when he knows how this is going to be treated.    Follow up:  Patient evaluated at his primary care clinic on 11/22.  Underwent incision and drainage of abscess and was place on antibiotics.    5.  Psychosocial: Michele does continue to work full-time.  His wife Sherie is retiring.  He will have to change insurance in January.  I did ask him to contact us and we will work with him for his changes in pharmacy for his prescriptions.  He reports his mood has fine.    6.  History of abnormal glucose tolerance: Michele is  interested in being seen in endocrinology.  He has canceled a few appointments in the past.  He is committed to getting back on track with this.    Annual studies due: 5/2023  Immunizations: TDAP  Colonoscopy: 10/2023    Siobhan Munoz MD MPH  Associate Professor of Medicine  Pulmonary, Allergy, Critical Care and Sleep Medicine      Interval History:     Michele reports little in the way of cough or sputum production.  He denies any chest congestion.  He is not short of breath with his activities of daily living.  He is doing a bit of traveling for work and is tolerated this without difficulty.  Again he is concerned that he might be deconditioned and is working on some weight training.  I have also encouraged him to increase his cardio exercise as well.         Review of Systems:     CONSTITUTIONAL: no fever, no chills, no sweats, no change in weight, no change in energy, no change in appetite fine--    INTEGUMENTARY/SKIN: Please see above  for lymphadenopathy and infection    ENT/MOUTH: no sore throat, no new sinus pain, no new nasal drainage, no new nasal congestion, no ear ringing     RESPIRATORY: see interval history    CV: no chest pain, no palpitations, no peripheral edema    GI: no nausea, no vomiting, no change in stools, no fatty stools, no GERD    : negative urinary, recent epididymitis that was worked up    MUSCULOSKELETAL: Recent neck discomfort that is now resolved    ENDOCRINE: Negative    NEURO: Musculoskeletal headaches    SLEEP: Disruptive overnight because of the lymph node    PSYCHIATRIC: mood stable--fine          Past Medical and Surgical History:     Past Medical History:   Diagnosis Date     CAP (community acquired pneumonia)     2011     CF (cystic fibrosis) (H)     diagnosed 1969, malnutrition, staph empyema     Chronic knee pain      Diverticular disease 2018    see on colonoscopy     GERD (gastroesophageal reflux disease)     egd in the past     Pancreatic insufficiency      Past Surgical History:   Procedure Laterality Date     ENDOSCOPIC SINUS SURGERY      removal of mucocele behind eye, 1992     OPTICAL TRACKING SYSTEM ENDOSCOPIC SINUS SURGERY Bilateral 11/28/2016    Procedure: OPTICAL TRACKING SYSTEM ENDOSCOPIC SINUS SURGERY;  Surgeon: Harriett Cosby MD;  Location:  OR           Family History:     Family History   Problem Relation Age of Onset     Lipids Father      Cardiovascular Father      Diabetes Maternal Grandmother         type 2            Social History:     Social History     Socioeconomic History     Marital status:      Spouse name: Not on file     Number of children: 1     Years of education: Not on file     Highest education level: Not on file   Occupational History     Occupation:    Tobacco Use     Smoking status: Never     Smokeless tobacco: Never   Substance and Sexual Activity     Alcohol use: No     Alcohol/week: 0.0 standard drinks     Drug use: No     Sexual activity: Yes      "Partners: Female     Birth control/protection: None   Other Topics Concern     Parent/sibling w/ CABG, MI or angioplasty before 65F 55M? Not Asked   Social History Narrative    8/18/2020 --Lives in [place] with his wife and 15 yo son (Raghu).  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     Social Determinants of Health     Financial Resource Strain: Not on file   Food Insecurity: Not on file   Transportation Needs: Not on file   Physical Activity: Not on file   Stress: Not on file   Social Connections: Not on file   Intimate Partner Violence: Not on file   Housing Stability: Not on file            Medications:     Current Outpatient Medications   Medication     albuterol (PROAIR HFA/PROVENTIL HFA/VENTOLIN HFA) 108 (90 Base) MCG/ACT inhaler     azithromycin (ZITHROMAX) 500 MG tablet     aztreonam lysine (CAYSTON) 75 MG SOLR     Cholecalciferol (VITAMIN D) 2000 UNITS CAPS     elexacaftor-tezacaftor-ivacaftor & ivacaftor (TRIKAFTA) 100-50-75 & 150 MG tablet pack     hydrochlorothiazide (MICROZIDE) 12.5 MG capsule     multivitamin CF formula (AQUADEKS) chewable tablet     pantoprazole (PROTONIX) 40 MG EC tablet     Norah Altera Nebulizer System [facility]     Respiratory Therapy Supplies (NORAH ALTERA NEBULIZER HANDSET) [facility]     Respiratory Therapy Supplies (NORAH ALTERA NEBULIZER HANDSET) MISC     ZENPEP 75335-71610 units CPEP     sodium chloride inhalant 7 % NEBU neb solution     No current facility-administered medications for this visit.            Physical Exam:   /83   Pulse 71   Resp 17   Ht 1.75 m (5' 8.9\")   Wt 77.1 kg (170 lb)   SpO2 98%   BMI 25.18 kg/m      Constitutional:   Awake, alert and in no apparent distress     Eyes:   nonicteric     ENT:   Mask in place     Neck:   Supple without supraclavicular or cervical lymphadenopathy     Lungs:   Good air flow.  No crackles. No rhonchi.  No wheezes.     Cardiovascular:   Normal S1 and S2.  RRR.  " No murmur, gallop or rub.     Abdomen:   NABS, soft, nontender, nondistended.      Musculoskeletal:   No edema, digital clubbing present     Neurologic:   Alert and conversant.     Skin:   Warm, dry.  Visible left inguinal lymphadenopathy with surrounding erythema that is probably about 6 x 8 cm, evidence of some dry serous drainage            Data:   All laboratory and imaging data reviewed.    Cystic Fibrosis Culture  Specimen Description   Date Value Ref Range Status   02/09/2018 Sputum  Final   12/22/2017 Sputum  Final   01/12/2016 Sputum  Final   01/12/2016 Sputum  Final   01/12/2016 Sputum  Final    Culture Micro   Date Value Ref Range Status   02/09/2018 Moderate growth  Normal tessa    Final   02/09/2018 (A)  Final    Moderate growth  Pseudomonas aeruginosa, mucoid strain     02/09/2018 Moderate growth  Pseudomonas aeruginosa   (A)  Final   02/09/2018 Light growth  Staphylococcus aureus   (A)  Final        No results found for this or any previous visit (from the past 168 hour(s)).    PFT: Not performed today.    Pulmonary exacerbation: absent    55 minutes spent on the date of the encounter doing chart review, history and exam, documentation and further activities per the note  Time documented is excluding time spent for PFT interpretation.        Again, thank you for allowing me to participate in the care of your patient.        Sincerely,        Siobhan Munoz MD

## 2022-11-27 LAB
BACTERIA SPEC CULT: ABNORMAL
BACTERIA SPEC CULT: ABNORMAL

## 2022-11-28 DIAGNOSIS — E84.9 CF (CYSTIC FIBROSIS) (H): ICD-10-CM

## 2022-11-28 RX ORDER — ELEXACAFTOR, TEZACAFTOR, AND IVACAFTOR 100-50-75
KIT ORAL
Qty: 84 TABLET | Refills: 2 | Status: SHIPPED | OUTPATIENT
Start: 2022-11-28 | End: 2023-01-12

## 2022-11-30 DIAGNOSIS — E84.0 CYSTIC FIBROSIS WITH PULMONARY MANIFESTATIONS (H): ICD-10-CM

## 2022-11-30 DIAGNOSIS — E55.9 VITAMIN D DEFICIENCY: ICD-10-CM

## 2022-11-30 DIAGNOSIS — K21.9 GASTROESOPHAGEAL REFLUX DISEASE WITHOUT ESOPHAGITIS: ICD-10-CM

## 2022-11-30 DIAGNOSIS — K86.89 PANCREATIC INSUFFICIENCY: ICD-10-CM

## 2022-11-30 RX ORDER — AZITHROMYCIN 500 MG/1
500 TABLET, FILM COATED ORAL
Qty: 39 TABLET | Refills: 3 | Status: SHIPPED | OUTPATIENT
Start: 2022-11-30 | End: 2022-12-12

## 2022-12-06 NOTE — PROGRESS NOTES
Michele Altamirano  is being evaluated via a billable video visit.      How would you like to obtain your AVS? Ylopo  For the video visit, send the invitation by: Send to e-mail at: [email or web address]  Will anyone else be joining your video visit? No      Outcome for 12/06/22 9:58 AM: [employer] message sent  Taylor Myhre, VF  Outcome for 12/09/22 12:26 PM: Patient is not checking blood sugars. Pt states he checks maybe once every 2 weeks but usually when he is feeling lows.   PETEY Briggs     CF Endocrinology Consultation:  Diabetes  :   Patient: Michele Altamirano MRN# [id number]   YOB: 1965 Age: 57 year old   Date of Visit: 12/13/2022     Dear Dr. Siobhan Munoz:    I had the pleasure of seeing your patient, Michele Altamirano in the CF Endocrinology Clinic, [facility]  for a consultation regarding impaired glucose tolerance.           Problem list:     Patient Active Problem List    Diagnosis Date Noted     Gastroesophageal reflux disease 01/06/2021     Priority: Medium     Impaired glucose tolerance 01/06/2021     Priority: Medium     Knee pain 01/06/2021     Priority: Medium     Recurrent sinusitis 01/06/2021     Priority: Medium     Restless legs syndrome 01/06/2021     Priority: Medium     Blanca's neuroma, left 11/16/2017     Priority: Medium     Abnormal glucose tolerance test 11/20/2015     Priority: Medium     Vitamin D deficiency 09/24/2015     Priority: Medium     Screening for diabetes mellitus (DM) 09/24/2015     Priority: Medium     Screening for hyperlipidemia 09/24/2015     Priority: Medium     Lipid screening 09/24/2015     Priority: Medium     Pancreatic insufficiency 08/18/2015     Priority: Medium     Cystic fibrosis with pulmonary manifestations (H) 02/24/2012     Priority: Medium     SWEAT TEST:  Date: 12/3/69            Laboratory:   Sample #1:      Unknown mg           120mmol/L Cl      GENOTYPING:  Date: 10/02/12       Laboratory: [facility] through [facility]  Mutation Analysis Program  Genotype: Delta F508/Delta [id number]  Poly T Variant:                 HPI:   Michele is a 57 year old male with impaired glucose tolerance.    I have reviewed the available past laboratory evaluations, imaging studies, and medical records available to me at this visit.    History was obtained from the patient and the medical record.  I have reviewed the notes of the pulmonary care team entered into the medical record     Patient has history of impaired glucose tolerance.  He has had an A1c in the past which was 6.6% in the diabetes range.  Most recent glucose tolerance test was done in the setting of a research study  7/16/2021 2-hour glucose on OGTT was 193    He has history of reactive hypoglycemia.  Reports that he occasionally gets symptoms of hypoglycemia after breakfast and lunch.  He does not always check fingerstick blood glucose at this time.    He is on Trikafta and overall feeling well  Has history of pancreatic insufficiency  He is interested in getting prescription of CGM.  He use CGM as part of the research study and found it very useful    I have read and interpreted the data from the patient glucose downloads.  Patient rarely checks fingerstick blood glucose and data is shared through [employer].  He has had occasional readings above 200 and also occasional readings in the 50s and 60s range.    A1c:  Previous two HbA1c results:   Lab Results   Component Value Date    A1C 5.9 05/11/2022    A1C 6.1 11/03/2020    A1C 5.9 07/14/2020      Result was discussed at today's visit.     Current insulin regimen:   None          Past Medical History:     Past Medical History:   Diagnosis Date     CAP (community acquired pneumonia)     2011     CF (cystic fibrosis) (H)     diagnosed 1969, malnutrition, staph empyema     Chronic knee pain      Diverticular disease 2018    see on colonoscopy     GERD (gastroesophageal reflux disease)     egd in the past     Pancreatic insufficiency             Past  Surgical History:     Past Surgical History:   Procedure Laterality Date     ENDOSCOPIC SINUS SURGERY      removal of mucocele behind eye, 1992     OPTICAL TRACKING SYSTEM ENDOSCOPIC SINUS SURGERY Bilateral 11/28/2016    Procedure: OPTICAL TRACKING SYSTEM ENDOSCOPIC SINUS SURGERY;  Surgeon: Harriett Cosby MD;  Location:  OR               Social History:     Social History     Social History Narrative    8/18/2020 --Lives in [place] with his wife and 15 yo son (Raghu).  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              Family History:     Family History   Problem Relation Age of Onset     Lipids Father      Cardiovascular Father      Diabetes Maternal Grandmother         type 2            Allergies:   No Known Allergies          Medications:     Current Outpatient Rx   Medication Sig Dispense Refill     albuterol (PROAIR HFA/PROVENTIL HFA/VENTOLIN HFA) 108 (90 Base) MCG/ACT inhaler USE 2 INHALATIONS ORALLY   EVERY 4 HOURS AS NEEDED FORSHORTNESS OF BREATH/DYSPNEAOR WHEEZING 54 g 3     azithromycin (ZITHROMAX) 500 MG tablet Take 1 tablet (500 mg) by mouth Every Mon, Wed, Fri Morning 39 tablet 3     aztreonam lysine (CAYSTON) 75 MG SOLR RECONSTITUTE WITH PROVIDED DILUENT AND INHALE THE CONTENTS OF 1 VIAL VIA NORAH ALTERA NEBULIZER 3 TIMES A DAY FOR 28 DAYS ON AND 28 DAYS OFF 84 mL 6     Cholecalciferol (VITAMIN D) 2000 UNITS CAPS Take 2 capsules by mouth daily 60 capsule 3     ciprofloxacin (CIPRO) 750 MG tablet Take 1 tablet (750 mg) by mouth 2 times daily Hold azithromycin while taking cipro 20 tablet 0     hydrochlorothiazide (MICROZIDE) 12.5 MG capsule Take 12.5 mg by mouth daily       multivitamin CF formula (AQUADEKS) chewable tablet Take 2 tablets by mouth daily 60 tablet 3     pantoprazole (PROTONIX) 40 MG EC tablet TAKE 1 TABLET BY MOUTH TWICE A  tablet 3     Norah Altera Nebulizer System MISC 1 Units 3 times daily 28 days on, 28 days  off. 1 each 1     Respiratory Therapy Supplies (EDU ALTERA NEBULIZER HANDSET) MISC 1 Device 3 times daily 28 days on, 28 days off. 1 each 5     Respiratory Therapy Supplies (EDU ALTERA NEBULIZER HANDSET) MISC Take 75 mg by nebulization 3 times daily 1 each 6     sodium chloride inhalant 7 % NEBU neb solution TAKE 4 MLS BY NEBULIZATION DAILY 720 mL 3     TRIKAFTA 100-50-75 & 150 MG tablet pack TAKE TWO ORANGE TABLETS BY MOUTH IN THE MORNING AND ONE BLUE TABLET IN THE EVENING AS DIRECTED ON PACKAGE.  TAKE WITH FAT CONTAINING FOOD. PATIENT NEEDS APPOINTMENT FOR ADDITIONAL REFILLS. 84 tablet 2     ZENPEP 43711-38855 units CPEP TAKE 8 CAPSULES 3 TIMES A  DAY WITH MEALS AND 3       CAPSULES WITH SNACKS. 3    MEALS/3 SNACKS PER DAY 2900 capsule 3             Review of Systems:     Comprehensive ROS negative other than the symptoms noted above in the HPI.          Physical Exam:   There were no vitals taken for this visit.  Growth percentile SmartLinks can only be used for patients less than 20 years old.  Height: Data Unavailable, Facility age limit for growth percentiles is 20 years.  Weight: 0 lbs 0 oz, Facility age limit for growth percentiles is 20 years.  BMI: There is no height or weight on file to calculate BMI., No height and weight on file for this encounter.      GENERAL: Healthy, alert and no distress  EYES: Eyes grossly normal to inspection.  No discharge or erythema, or obvious scleral/conjunctival abnormalities.  RESP: No audible wheeze, cough, or visible cyanosis.  No visible retractions or increased work of breathing.    NEURO: Cranial nerves grossly intact.  Mentation and speech appropriate for age.  PSYCH: Mentation appears normal, affect normal/bright, judgement and insight intact, normal speech and appearance well-groomed.        Laboratory results:     TSH   Date Value Ref Range Status   05/11/2022 1.28 0.40 - 4.00 mU/L Final   11/03/2020 1.66 mcU/mL Final     Testosterone Total   Date Value Ref  Range Status   05/11/2022 537 240 - 950 ng/dL Final   11/03/2020 721 ng/dL Final     Cholesterol   Date Value Ref Range Status   05/11/2022 173 <200 mg/dL Final   11/03/2020 222 mg/dL Final     Albumin Urine mg/L   Date Value Ref Range Status   05/11/2022 11 mg/L Final   02/09/2018 5 mg/L Final     Triglycerides   Date Value Ref Range Status   05/11/2022 109 <150 mg/dL Final   11/03/2020 184 mg/dL Final     HDL Cholesterol   Date Value Ref Range Status   11/03/2020 72 mg/dL Final     Direct Measure HDL   Date Value Ref Range Status   05/11/2022 63 >=40 mg/dL Final     LDL Cholesterol Calculated   Date Value Ref Range Status   05/11/2022 88 <=100 mg/dL Final   11/03/2020 113 mg/dL Final     Cholesterol/HDL Ratio   Date Value Ref Range Status   09/24/2015 2.3 0.0 - 5.0 Final     Non HDL Cholesterol   Date Value Ref Range Status   05/11/2022 110 <130 mg/dL Final   02/09/2018 96 <130 mg/dL Final     Lab Results   Component Value Date    A1C 5.9 05/11/2022    A1C 6.1 11/03/2020    A1C 5.9 07/14/2020    A1C 6.3 02/09/2018    A1C 6.3 10/18/2016    A1C 6.1 07/19/2016    No results found for: HEMOGLOBINA1          Diabetes Health Maintenance    Date of Diabetes Diagnosis: Impaired glucose tolerance, A1c 6.6% in 2015    Special Notes (if any): Postprandial hypoglycemia    Date Last Eye Exam:      Date Last Dental Appointment:     Dates of Episodes Severe* Hypoglycemia (month/year, cumulative, ongoing, assess each visit):    *Severe=patient unconscious, seizure, unable to help self    Last 25-Vitamin D (every year):     Last DXA, lowest Z-score (every 2 years):        ?Bisphosphonates (yes/no):     Last Urine Microalbumin (every year):      No results found for: MICROALBUMIN    Last Testosterone:   Testosterone Total   Date Value Ref Range Status   05/11/2022 537 240 - 950 ng/dL Final   11/03/2020 721 ng/dL Final            Assessment and Plan:   Michele is a 57 year old male with CF, pancreatic insufficiency and history of  impaired glucose tolerance    Impaired glucose tolerance: Last OGTT was in July 2021 which showed follow-up 2-hour glucose of 193.   readings above 200 on random glucose checks at home.   Most recent A1c was 5.9%  Discussed repeating OGTT this year.  At this time he is interested in use of continuous glucose monitor.  His home glucose readings suggest that his postprandial readings go above 200 frequently  CGM would also be helpful in evaluating reactive hypoglycemia symptoms  Prescription for diego 2 was sent  Diabetes education offered but patient declined.  He feels that he can learn use of CGM by watching online videos    Postprandial hypoglycemia: He was counseled about pathophysiology and symptoms of postprandial hypoglycemia.  Counseled about dietary changes to avoid postprandial hypoglycemia.  He will keep a food diary while using CGM    Return to clinic based on pending CGM data    Thank you for allowing me to participate in the care of your patient.  Please do not hesitate to call with questions or concerns.    Sincerely,    GIANNI Juarez    Video visit done using [employer]  Video visit start time 9:25 AM  Video visit end time 9:47 AM  Patient location: Home  Provider location: Off-site    Note: Chart documentation done in part with Dragon Voice Recognition software. Although reviewed after completion, some word and grammatical errors may remain.  Please consider this when interpreting information in this chart    CC  ANTON ROSSI

## 2022-12-12 DIAGNOSIS — E55.9 VITAMIN D DEFICIENCY: ICD-10-CM

## 2022-12-12 DIAGNOSIS — E84.0 CYSTIC FIBROSIS WITH PULMONARY MANIFESTATIONS (H): ICD-10-CM

## 2022-12-12 DIAGNOSIS — K21.9 GASTROESOPHAGEAL REFLUX DISEASE WITHOUT ESOPHAGITIS: ICD-10-CM

## 2022-12-12 DIAGNOSIS — K86.89 PANCREATIC INSUFFICIENCY: ICD-10-CM

## 2022-12-12 RX ORDER — AZITHROMYCIN 500 MG/1
500 TABLET, FILM COATED ORAL
Qty: 39 TABLET | Refills: 3 | Status: SHIPPED | OUTPATIENT
Start: 2022-12-12 | End: 2023-03-14

## 2022-12-12 ASSESSMENT — ENCOUNTER SYMPTOMS
ARTHRALGIAS: 1
JOINT SWELLING: 1
NECK PAIN: 1
MUSCLE WEAKNESS: 0
MYALGIAS: 1
BACK PAIN: 1
STIFFNESS: 0
MUSCLE CRAMPS: 1

## 2022-12-13 ENCOUNTER — VIRTUAL VISIT (OUTPATIENT)
Dept: ENDOCRINOLOGY | Facility: CLINIC | Age: 57
End: 2022-12-13
Attending: INTERNAL MEDICINE
Payer: COMMERCIAL

## 2022-12-13 DIAGNOSIS — E08.9 DIABETES MELLITUS RELATED TO CF (CYSTIC FIBROSIS) (H): Primary | ICD-10-CM

## 2022-12-13 DIAGNOSIS — E84.8 DIABETES MELLITUS RELATED TO CF (CYSTIC FIBROSIS) (H): Primary | ICD-10-CM

## 2022-12-13 PROCEDURE — 99204 OFFICE O/P NEW MOD 45 MIN: CPT | Mod: GT | Performed by: INTERNAL MEDICINE

## 2022-12-13 NOTE — LETTER
12/13/2022       RE: Michele Altamirano  [address] [facility] [address]     Dear Colleague,    Thank you for referring your patient, Michele Altamirano, to the [facility] [place] at [facility]. Please see a copy of my visit note below.    CF Endocrinology Consultation:  Diabetes  :   Patient: Michele Altamirano MRN# [id number]   YOB: 1965 Age: 57 year old   Date of Visit: 12/13/2022     Dear Dr. Siobhan Munoz:    I had the pleasure of seeing your patient, Michele Altamirano in the CF Endocrinology Clinic, [facility]  for a consultation regarding impaired glucose tolerance.           Problem list:     Patient Active Problem List    Diagnosis Date Noted     Gastroesophageal reflux disease 01/06/2021     Priority: Medium     Impaired glucose tolerance 01/06/2021     Priority: Medium     Knee pain 01/06/2021     Priority: Medium     Recurrent sinusitis 01/06/2021     Priority: Medium     Restless legs syndrome 01/06/2021     Priority: Medium     Blanca's neuroma, left 11/16/2017     Priority: Medium     Abnormal glucose tolerance test 11/20/2015     Priority: Medium     Vitamin D deficiency 09/24/2015     Priority: Medium     Screening for diabetes mellitus (DM) 09/24/2015     Priority: Medium     Screening for hyperlipidemia 09/24/2015     Priority: Medium     Lipid screening 09/24/2015     Priority: Medium     Pancreatic insufficiency 08/18/2015     Priority: Medium     Cystic fibrosis with pulmonary manifestations (H) 02/24/2012     Priority: Medium     SWEAT TEST:  Date: 12/3/69            Laboratory:   Sample #1:      Unknown mg           120mmol/L Cl      GENOTYPING:  Date: 10/02/12       Laboratory: [facility] through [facility] Mutation Analysis Program  Genotype: Delta F508/Delta F508  Poly T Variant:                 HPI:   Michele is a 57 year old male with impaired glucose tolerance.    I have reviewed the available past laboratory  evaluations, imaging studies, and medical records available to me at this visit.    History was obtained from the patient and the medical record.  I have reviewed the notes of the pulmonary care team entered into the medical record     Patient has history of impaired glucose tolerance.  He has had an A1c in the past which was 6.6% in the diabetes range.  Most recent glucose tolerance test was done in the setting of a research study  7/16/2021 2-hour glucose on OGTT was 193    He has history of reactive hypoglycemia.  Reports that he occasionally gets symptoms of hypoglycemia after breakfast and lunch.  He does not always check fingerstick blood glucose at this time.    He is on Trikafta and overall feeling well  Has history of pancreatic insufficiency  He is interested in getting prescription of CGM.  He use CGM as part of the research study and found it very useful    I have read and interpreted the data from the patient glucose downloads.  Patient rarely checks fingerstick blood glucose and data is shared through [employer].  He has had occasional readings above 200 and also occasional readings in the 50s and 60s range.    A1c:  Previous two HbA1c results:   Lab Results   Component Value Date    A1C 5.9 05/11/2022    A1C 6.1 11/03/2020    A1C 5.9 07/14/2020      Result was discussed at today's visit.     Current insulin regimen:   None          Past Medical History:     Past Medical History:   Diagnosis Date     CAP (community acquired pneumonia)     2011     CF (cystic fibrosis) (H)     diagnosed 1969, malnutrition, staph empyema     Chronic knee pain      Diverticular disease 2018    see on colonoscopy     GERD (gastroesophageal reflux disease)     egd in the past     Pancreatic insufficiency             Past Surgical History:     Past Surgical History:   Procedure Laterality Date     ENDOSCOPIC SINUS SURGERY      removal of mucocele behind eye, 1992     OPTICAL TRACKING SYSTEM ENDOSCOPIC SINUS SURGERY Bilateral  11/28/2016    Procedure: OPTICAL TRACKING SYSTEM ENDOSCOPIC SINUS SURGERY;  Surgeon: Harriett Cosby MD;  Location:  OR               Social History:     Social History     Social History Narrative    8/18/2020 --Lives in [place] with his wife and 15 yo son (Raghu).  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              Family History:     Family History   Problem Relation Age of Onset     Lipids Father      Cardiovascular Father      Diabetes Maternal Grandmother         type 2            Allergies:   No Known Allergies          Medications:     Current Outpatient Rx   Medication Sig Dispense Refill     albuterol (PROAIR HFA/PROVENTIL HFA/VENTOLIN HFA) 108 (90 Base) MCG/ACT inhaler USE 2 INHALATIONS ORALLY   EVERY 4 HOURS AS NEEDED FORSHORTNESS OF BREATH/DYSPNEAOR WHEEZING 54 g 3     azithromycin (ZITHROMAX) 500 MG tablet Take 1 tablet (500 mg) by mouth Every Mon, Wed, Fri Morning 39 tablet 3     aztreonam lysine (CAYSTON) 75 MG SOLR RECONSTITUTE WITH PROVIDED DILUENT AND INHALE THE CONTENTS OF 1 VIAL VIA NORAH ALTERA NEBULIZER 3 TIMES A DAY FOR 28 DAYS ON AND 28 DAYS OFF 84 mL 6     Cholecalciferol (VITAMIN D) 2000 UNITS CAPS Take 2 capsules by mouth daily 60 capsule 3     ciprofloxacin (CIPRO) 750 MG tablet Take 1 tablet (750 mg) by mouth 2 times daily Hold azithromycin while taking cipro 20 tablet 0     hydrochlorothiazide (MICROZIDE) 12.5 MG capsule Take 12.5 mg by mouth daily       multivitamin CF formula (AQUADEKS) chewable tablet Take 2 tablets by mouth daily 60 tablet 3     pantoprazole (PROTONIX) 40 MG EC tablet TAKE 1 TABLET BY MOUTH TWICE A  tablet 3     Norah Altera Nebulizer System MISC 1 Units 3 times daily 28 days on, 28 days off. 1 each 1     Respiratory Therapy Supplies (NORAH ALTERA NEBULIZER HANDSET) MISC 1 Device 3 times daily 28 days on, 28 days off. 1 each 5     Respiratory Therapy Supplies (NORAH ALTERA NEBULIZER HANDSET)  MISC Take 75 mg by nebulization 3 times daily 1 each 6     sodium chloride inhalant 7 % NEBU neb solution TAKE 4 MLS BY NEBULIZATION DAILY 720 mL 3     TRIKAFTA 100-50-75 & 150 MG tablet pack TAKE TWO ORANGE TABLETS BY MOUTH IN THE MORNING AND ONE BLUE TABLET IN THE EVENING AS DIRECTED ON PACKAGE.  TAKE WITH FAT CONTAINING FOOD. PATIENT NEEDS APPOINTMENT FOR ADDITIONAL REFILLS. 84 tablet 2     ZENPEP 37753-04112 units CPEP TAKE 8 CAPSULES 3 TIMES A  DAY WITH MEALS AND 3       CAPSULES WITH SNACKS. 3    MEALS/3 SNACKS PER DAY 2900 capsule 3             Review of Systems:     Comprehensive ROS negative other than the symptoms noted above in the HPI.          Physical Exam:   There were no vitals taken for this visit.  Growth percentile SmartLinks can only be used for patients less than 20 years old.  Height: Data Unavailable, Facility age limit for growth percentiles is 20 years.  Weight: 0 lbs 0 oz, Facility age limit for growth percentiles is 20 years.  BMI: There is no height or weight on file to calculate BMI., No height and weight on file for this encounter.      GENERAL: Healthy, alert and no distress  EYES: Eyes grossly normal to inspection.  No discharge or erythema, or obvious scleral/conjunctival abnormalities.  RESP: No audible wheeze, cough, or visible cyanosis.  No visible retractions or increased work of breathing.    NEURO: Cranial nerves grossly intact.  Mentation and speech appropriate for age.  PSYCH: Mentation appears normal, affect normal/bright, judgement and insight intact, normal speech and appearance well-groomed.        Laboratory results:     TSH   Date Value Ref Range Status   05/11/2022 1.28 0.40 - 4.00 mU/L Final   11/03/2020 1.66 mcU/mL Final     Testosterone Total   Date Value Ref Range Status   05/11/2022 537 240 - 950 ng/dL Final   11/03/2020 721 ng/dL Final     Cholesterol   Date Value Ref Range Status   05/11/2022 173 <200 mg/dL Final   11/03/2020 222 mg/dL Final     Albumin Urine  mg/L   Date Value Ref Range Status   05/11/2022 11 mg/L Final   02/09/2018 5 mg/L Final     Triglycerides   Date Value Ref Range Status   05/11/2022 109 <150 mg/dL Final   11/03/2020 184 mg/dL Final     HDL Cholesterol   Date Value Ref Range Status   11/03/2020 72 mg/dL Final     Direct Measure HDL   Date Value Ref Range Status   05/11/2022 63 >=40 mg/dL Final     LDL Cholesterol Calculated   Date Value Ref Range Status   05/11/2022 88 <=100 mg/dL Final   11/03/2020 113 mg/dL Final     Cholesterol/HDL Ratio   Date Value Ref Range Status   09/24/2015 2.3 0.0 - 5.0 Final     Non HDL Cholesterol   Date Value Ref Range Status   05/11/2022 110 <130 mg/dL Final   02/09/2018 96 <130 mg/dL Final     Lab Results   Component Value Date    A1C 5.9 05/11/2022    A1C 6.1 11/03/2020    A1C 5.9 07/14/2020    A1C 6.3 02/09/2018    A1C 6.3 10/18/2016    A1C 6.1 07/19/2016    No results found for: HEMOGLOBINA1        CF  Diabetes Health Maintenance    Date of Diabetes Diagnosis: Impaired glucose tolerance, A1c 6.6% in 2015    Special Notes (if any): Postprandial hypoglycemia    Date Last Eye Exam:      Date Last Dental Appointment:     Dates of Episodes Severe* Hypoglycemia (month/year, cumulative, ongoing, assess each visit):    *Severe=patient unconscious, seizure, unable to help self    Last 25-Vitamin D (every year):     Last DXA, lowest Z-score (every 2 years):        ?Bisphosphonates (yes/no):     Last Urine Microalbumin (every year):      No results found for: MICROALBUMIN    Last Testosterone:   Testosterone Total   Date Value Ref Range Status   05/11/2022 537 240 - 950 ng/dL Final   11/03/2020 721 ng/dL Final            Assessment and Plan:   Michele is a 57 year old male with CF, pancreatic insufficiency and history of impaired glucose tolerance    Impaired glucose tolerance: Last OGTT was in July 2021 which showed follow-up 2-hour glucose of 193.   readings above 200 on random glucose checks at home.   Most recent A1c was  5.9%  Discussed repeating OGTT this year.  At this time he is interested in use of continuous glucose monitor.  His home glucose readings suggest that his postprandial readings go above 200 frequently  CGM would also be helpful in evaluating reactive hypoglycemia symptoms  Prescription for diego 2 was sent  Diabetes education offered but patient declined.  He feels that he can learn use of CGM by watching online videos    Postprandial hypoglycemia: He was counseled about pathophysiology and symptoms of postprandial hypoglycemia.  Counseled about dietary changes to avoid postprandial hypoglycemia.  He will keep a food diary while using CGM    Return to clinic based on pending CGM data    Thank you for allowing me to participate in the care of your patient.  Please do not hesitate to call with questions or concerns.    Sincerely,    GIANNI Juarez    Video visit done using [employer]  Video visit start time 9:25 AM  Video visit end time 9:47 AM  Patient location: Home  Provider location: Off-site    Note: Chart documentation done in part with Dragon Voice Recognition software. Although reviewed after completion, some word and grammatical errors may remain.  Please consider this when interpreting information in this chart    CC  ANTON ROSSI

## 2022-12-13 NOTE — NURSING NOTE
Patient denies any changes since echeck-in regarding medication and allergies and states all information entered during echeck-in remains accurate.    Tiffanie Godoy MA

## 2022-12-20 ENCOUNTER — TELEPHONE (OUTPATIENT)
Dept: ENDOCRINOLOGY | Facility: CLINIC | Age: 57
End: 2022-12-20

## 2022-12-20 NOTE — TELEPHONE ENCOUNTER
PA Initiation    Medication: Continuous Blood Gluc  (FREESTYLE SHARMAINE 2 READER) SAVI - INITIATED  Insurance Company: [employer] - Phone [phone] Fax [phone]  Pharmacy Filling the Rx: [facility] - [place], [place] - [address]  Filling Pharmacy Phone: [phone]  Filling Pharmacy Fax:    Start Date: 12/20/2022

## 2022-12-20 NOTE — TELEPHONE ENCOUNTER
PRIOR AUTHORIZATION DENIED    Medication: Continuous Blood Gluc  (FREESTYLE SHARMAINE 2 READER) SAVI SLATER DENIED    Denial Date: 12/20/2022    Denial Rational: MUST TRY/FAIL DEXCOM CGM      Appeal Information: IF PROVIDER WOULD LIKE TO APPEAL THIS DECISION PLEASE PROVIDE PA TEAM WITH LETTER OF MEDICAL NECESSITY

## 2022-12-23 DIAGNOSIS — E08.9 DIABETES MELLITUS RELATED TO CYSTIC FIBROSIS (H): Primary | ICD-10-CM

## 2022-12-23 DIAGNOSIS — E84.8 DIABETES MELLITUS RELATED TO CYSTIC FIBROSIS (H): Primary | ICD-10-CM

## 2022-12-23 RX ORDER — PROCHLORPERAZINE 25 MG/1
SUPPOSITORY RECTAL
Qty: 3 EACH | Refills: 11 | Status: SHIPPED | OUTPATIENT
Start: 2022-12-23 | End: 2024-04-09

## 2022-12-23 RX ORDER — PROCHLORPERAZINE 25 MG/1
SUPPOSITORY RECTAL
Qty: 1 EACH | Refills: 3 | Status: SHIPPED | OUTPATIENT
Start: 2022-12-23 | End: 2024-04-09

## 2022-12-23 RX ORDER — PROCHLORPERAZINE 25 MG/1
SUPPOSITORY RECTAL
Qty: 1 EACH | Refills: 0 | Status: SHIPPED | OUTPATIENT
Start: 2022-12-23 | End: 2024-04-09

## 2022-12-29 ENCOUNTER — TELEPHONE (OUTPATIENT)
Dept: ENDOCRINOLOGY | Facility: CLINIC | Age: 57
End: 2022-12-29

## 2022-12-29 NOTE — TELEPHONE ENCOUNTER
Prior Authorization Not Needed per Insurance    Medication: Continuous Blood Gluc Sensor (DEXCOM G6 SENSOR) MISC--NO PA NEEDED  Insurance Company: [employer] - Phone [phone] Fax [phone]  Expected CoPay:      Pharmacy Filling the Rx: [facility] - [place], [place] - [facility] [address]   Pharmacy Notified: Yes  Patient Notified: Yes **Instructed pharmacy to notify patient when script is ready to /ship.**

## 2022-12-29 NOTE — TELEPHONE ENCOUNTER
Prior Authorization Not Needed per Insurance    Medication: Continuous Blood Gluc Transmit (DEXCOM G6 TRANSMITTER) MISC--NO PA NEEDED  Insurance Company: [employer] - Phone [phone] Fax [phone]  Expected CoPay:      [facility] - [place], [place] - [facility] [address]   Pharmacy Notified: Yes  Patient Notified: Yes **Instructed pharmacy to notify patient when script is ready to /ship.**

## 2022-12-29 NOTE — TELEPHONE ENCOUNTER
Prior Authorization Not Needed per Insurance    Medication: Continuous Blood Gluc  (DEXCOM G6 ) SAVI--NO PA NEEDED  Insurance Company: [employer] - Phone [phone] Fax [phone]  Expected CoPay:      Pharmacy Filling the Rx: [facility] - [place], [place] - [facility] [address]   Pharmacy Notified: Yes  Patient Notified: Yes **Instructed pharmacy to notify patient when script is ready to /ship.**

## 2023-01-09 DIAGNOSIS — E55.9 VITAMIN D DEFICIENCY: ICD-10-CM

## 2023-01-09 DIAGNOSIS — E84.0 CYSTIC FIBROSIS WITH PULMONARY MANIFESTATIONS (H): ICD-10-CM

## 2023-01-09 DIAGNOSIS — K86.89 PANCREATIC INSUFFICIENCY: ICD-10-CM

## 2023-01-09 DIAGNOSIS — K21.9 GASTROESOPHAGEAL REFLUX DISEASE WITHOUT ESOPHAGITIS: ICD-10-CM

## 2023-01-09 DIAGNOSIS — E84.9 CYSTIC FIBROSIS (H): ICD-10-CM

## 2023-01-10 ENCOUNTER — TELEPHONE (OUTPATIENT)
Dept: PULMONOLOGY | Facility: CLINIC | Age: 58
End: 2023-01-10

## 2023-01-10 NOTE — TELEPHONE ENCOUNTER
PA Initiation    Medication: Trikafta PA pending (new insurance)   Insurance Company: Other (see comments)Comment:  [facility]  Pharmacy Filling the Rx:    Filling Pharmacy Phone:    Filling Pharmacy Fax:    Start Date: 1/10/2023     EOC ID: [id number] [email or web address]

## 2023-01-12 DIAGNOSIS — E84.9 CF (CYSTIC FIBROSIS) (H): ICD-10-CM

## 2023-01-12 RX ORDER — ELEXACAFTOR, TEZACAFTOR, AND IVACAFTOR 100-50-75
KIT ORAL
Qty: 84 TABLET | Refills: 1 | Status: SHIPPED | OUTPATIENT
Start: 2023-01-12 | End: 2023-01-26

## 2023-01-12 NOTE — TELEPHONE ENCOUNTER
Prior Authorization Approval    Authorization Effective Date: 1/10/2023  Authorization Expiration Date: 1/10/2024  Medication: Trikafta PA approved  Approved Dose/Quantity: 100-50-75 & 150mg / 84 for 28 ds   Reference #: Galen SLATER EOC   [id number]   Insurance Company: Other (see comments)Comment:  [facility]  Expected CoPay:       CoPay Card Available:      Foundation Assistance Needed:    Which Pharmacy is filling the prescription (Not needed for infusion/clinic administered): [facility] - [address]  Pharmacy Notified:    Patient Notified:

## 2023-01-19 ENCOUNTER — TELEPHONE (OUTPATIENT)
Dept: PULMONOLOGY | Facility: CLINIC | Age: 58
End: 2023-01-19
Payer: COMMERCIAL

## 2023-01-19 NOTE — TELEPHONE ENCOUNTER
PA Initiation    Medication: Zenpep PA pending   Insurance Company: Other (see comments)Comment:  [facility]  Pharmacy Filling the Rx:    Filling Pharmacy Phone:    Filling Pharmacy Fax:    Start Date: 1/19/2023    [id number]   [email or web address]

## 2023-01-26 DIAGNOSIS — E84.9 CF (CYSTIC FIBROSIS) (H): ICD-10-CM

## 2023-01-26 RX ORDER — ELEXACAFTOR, TEZACAFTOR, AND IVACAFTOR 100-50-75
KIT ORAL
Qty: 84 TABLET | Refills: 2 | Status: SHIPPED | OUTPATIENT
Start: 2023-01-26 | End: 2023-04-10

## 2023-02-16 NOTE — TELEPHONE ENCOUNTER
Prior Authorization Approval    Authorization Effective Date: 1/19/2023  Authorization Expiration Date: 1/19/2024  Medication: Zenpep PA approved   Approved Dose/Quantity: 18665 / 2900 for 90 ds  Reference #: [id number]   Insurance Company: Other (see comments)Comment:  [facility]  Expected CoPay:       CoPay Card Available:      Foundation Assistance Needed:    Which Pharmacy is filling the prescription (Not needed for infusion/clinic administered): [facility] - [address]  Pharmacy Notified:    Patient Notified:

## 2023-03-17 NOTE — TELEPHONE ENCOUNTER
Talked to patient and he stated that he will call me back and schedule his follow up visit with Dr. Munoz later today. Patient given my direct phone number.    There are no Wet Read(s) to document. There is 1 Wet Read(s) to document.

## 2023-03-20 DIAGNOSIS — E55.9 VITAMIN D DEFICIENCY: ICD-10-CM

## 2023-03-20 DIAGNOSIS — E84.0 CYSTIC FIBROSIS WITH PULMONARY MANIFESTATIONS (H): ICD-10-CM

## 2023-03-20 DIAGNOSIS — K21.9 GASTROESOPHAGEAL REFLUX DISEASE WITHOUT ESOPHAGITIS: ICD-10-CM

## 2023-03-20 DIAGNOSIS — K86.89 PANCREATIC INSUFFICIENCY: ICD-10-CM

## 2023-03-20 RX ORDER — PANTOPRAZOLE SODIUM 40 MG/1
40 TABLET, DELAYED RELEASE ORAL 2 TIMES DAILY
Qty: 180 TABLET | Refills: 3 | Status: SHIPPED | OUTPATIENT
Start: 2023-03-20 | End: 2024-01-08

## 2023-04-06 ENCOUNTER — TELEPHONE (OUTPATIENT)
Dept: PULMONOLOGY | Facility: CLINIC | Age: 58
End: 2023-04-06
Payer: COMMERCIAL

## 2023-04-09 DIAGNOSIS — E84.9 CF (CYSTIC FIBROSIS) (H): ICD-10-CM

## 2023-04-10 RX ORDER — ELEXACAFTOR, TEZACAFTOR, AND IVACAFTOR 100-50-75
KIT ORAL
Qty: 84 TABLET | Refills: 5 | Status: SHIPPED | OUTPATIENT
Start: 2023-04-10 | End: 2023-09-25

## 2023-04-21 ENCOUNTER — TELEPHONE (OUTPATIENT)
Dept: ENDOCRINOLOGY | Facility: CLINIC | Age: 58
End: 2023-04-21

## 2023-04-21 DIAGNOSIS — E08.9 DIABETES MELLITUS RELATED TO CYSTIC FIBROSIS (H): Primary | ICD-10-CM

## 2023-04-21 DIAGNOSIS — E84.8 DIABETES MELLITUS RELATED TO CYSTIC FIBROSIS (H): Primary | ICD-10-CM

## 2023-04-21 RX ORDER — PROCHLORPERAZINE 25 MG/1
SUPPOSITORY RECTAL
Qty: 1 EACH | Refills: 1 | Status: SHIPPED | OUTPATIENT
Start: 2023-04-21 | End: 2024-04-09

## 2023-04-21 RX ORDER — PROCHLORPERAZINE 25 MG/1
SUPPOSITORY RECTAL
Qty: 3 EACH | Refills: 5 | Status: SHIPPED | OUTPATIENT
Start: 2023-04-21 | End: 2024-04-09

## 2023-04-21 RX ORDER — PROCHLORPERAZINE 25 MG/1
SUPPOSITORY RECTAL
Qty: 1 EACH | Refills: 0 | Status: SHIPPED | OUTPATIENT
Start: 2023-04-21 | End: 2024-04-09

## 2023-04-21 NOTE — TELEPHONE ENCOUNTER
I can try for a prior auth but claim states patient must be on insulin, I dont see any insulin on his Med list??

## 2023-04-23 ENCOUNTER — HEALTH MAINTENANCE LETTER (OUTPATIENT)
Age: 58
End: 2023-04-23

## 2023-04-25 NOTE — TELEPHONE ENCOUNTER
PA Initiation    Medication: Dexcom pa pending  Insurance Company: [employer] [place] - Phone [phone] Fax [phone]  Pharmacy Filling the Rx:    Filling Pharmacy Phone:    Filling Pharmacy Fax:    Start Date: 4/25/2023

## 2023-04-25 NOTE — TELEPHONE ENCOUNTER
Prior Authorization Approval    Authorization Effective Date: 4/25/2023  Authorization Expiration Date: 4/24/2024  Medication: Dexcom G6 pa approved   Approved Dose/Quantity: 3  Reference #: [id number]   Insurance Company: [employer] [place] - Phone [phone] Fax [phone]  Expected CoPay:       CoPay Card Available:      Foundation Assistance Needed:    Which Pharmacy is filling the prescription (Not needed for infusion/clinic administered):    Pharmacy Notified:    Patient Notified:

## 2023-06-22 ENCOUNTER — HOSPITAL ENCOUNTER (OUTPATIENT)
Dept: RESEARCH | Facility: CLINIC | Age: 58
Discharge: HOME | End: 2023-06-22
Attending: INTERNAL MEDICINE | Admitting: INTERNAL MEDICINE

## 2023-06-22 DIAGNOSIS — Z00.6 EXAMINATION OF PARTICIPANT OR CONTROL IN CLINICAL RESEARCH: Primary | ICD-10-CM

## 2023-06-22 PROCEDURE — 510N000018 HC RESEARCH OGTT, PER HOUR

## 2023-06-22 PROCEDURE — 510N000009 HC RESEARCH FACILITY, PER 15 MIN

## 2023-06-22 PROCEDURE — 250N000009 HC RX 250: Performed by: INTERNAL MEDICINE

## 2023-06-22 PROCEDURE — 300N000004 HC RESEARCH SPECIMEN PROCESSING, MODERATE

## 2023-06-22 PROCEDURE — 510N000016 HC RESEARCH MEALS, PER MEAL

## 2023-06-22 RX ADMIN — ALCOHOL 75 G: 65 GEL TOPICAL at 09:05

## 2023-06-22 NOTE — ADDENDUM NOTE
Encounter addended by: Davina Diaz RN on: 6/22/2023 3:03 PM   Actions taken: Chief Complaint modified, Charge Capture section accepted

## 2023-07-24 NOTE — PROGRESS NOTES
[facility]  July 25, 2023         Assessment and Plan:   Michele Altamirano is a 57 year old male with cystic fibrosis.    1. CF lung disease with normal spirometry: Michele reports from a pulmonary point of view that he is doing well.  He has little in the way of pulmonary symptomatology.  He is able to exercise without limitation related to his lungs.  He historically has grown out Pseudomonas in his sputum.  His pulmonary function test today do show improvement compared to May of last year.  Michele does not participate in bronchial drainage or nebulized therapies at this time given his lack of symptomatology.  At this time I recommended to Michele:  -- He should certainly continue to do his vest and nebulized therapy if he were to have any worsening symptomatology  -- He is no longer consistently doing an inhalational antibiotic and I think that is appropriate  -- He should continue to remain active is his form of bronchial drainage therapy    2. Pancreatic Insufficiency/GI: Michele has no new symptoms consistent with worsening malabsorption.  He historically has had some difficulty with gastroesophageal reflux, so has remained on a PPI and H2 blocker.  He is considering coming off the H2 blocker to see if he still has control of his symptoms.  I did discuss with them how you should certainly reinitiate it for any change in symptomatology.  - continue the present dose of pancreatic enzymes  - continue vitamin supplementation.    3.  CFTR modulator therapy: Michele is on Trikafta and tolerating well.  He is due for an hepatic panel which she will have done locally in the next couple weeks.    4.  Abnormal glucose tolerance: Michele has a history of abnormal glucose tolerance.  He is doing some continuous glucose monitoring every few weeks.  He describes blood sugars averaging in the 140s.  He is due to follow-up with Dr. Obrien and endocrinology.  I encouraged that he make an  appointment.    5.  Osteoarthritis: Michele reports that he is now getting some steroid injections to some of his hand joints.  He still is struggling with some back discomfort.  I have encouraged him for to follow-up for second opinion.    6.  Psychosocial: Michele continues to work full-time.  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  His son is doing well at [employer].    Annual studies due: 5/2023  Recent Labs   Lab Test 05/11/22  1107        Siobhan Munoz MD MPH  Associate Professor of Medicine  Pulmonary, Allergy, Critical Care and Sleep Medicine        Interval History:     Michele reports little in the way of cough or sputum production.  He denies any chest congestion or shortness of breath.  He does not participate in bronchial drainage or nebulized therapies at this time.         Review of Systems:     CONSTITUTIONAL: no fever, no chills, no sweats, no change in weight, no change in energy--plenty, no change in appetite--great    INTEGUMENTARY/SKIN: no rash, no obvious new lesions    ENT/MOUTH: no sore throat, no new sinus pain, no new nasal drainage, no new nasal congestion, no ear ringing     RESPIRATORY: see interval history    CV: no chest pain, no palpitations, no peripheral edema    GI: no nausea, no vomiting, no change in stools, no fatty stools, controlled GERD    : negative urinary    MUSCULOSKELETAL: See above    ENDOCRINE: See above    NEURO:  No headache    SLEEP: no issues--nothing out of the ordinary for him, stays up late and has difficulty falling asleep.  Realize this is related to his device use.  Tries to get 6 hours of sleep at night.    PSYCHIATRIC: mood stable--fine          Past Medical and Surgical History:     Past Medical History:   Diagnosis Date    CAP (community acquired pneumonia)     2011    CF (cystic fibrosis) (H)     diagnosed 1969, malnutrition, staph empyema    Chronic knee pain     Diverticular disease 2018    see on colonoscopy    GERD  (gastroesophageal reflux disease)     egd in the past    Pancreatic insufficiency      Past Surgical History:   Procedure Laterality Date    ENDOSCOPIC SINUS SURGERY      removal of mucocele behind eye, 1992    OPTICAL TRACKING SYSTEM ENDOSCOPIC SINUS SURGERY Bilateral 11/28/2016    Procedure: OPTICAL TRACKING SYSTEM ENDOSCOPIC SINUS SURGERY;  Surgeon: Harriett Cosby MD;  Location:  OR           Family History:     Family History   Problem Relation Age of Onset    Lipids Father     Cardiovascular Father     Diabetes Maternal Grandmother         type 2            Social History:     Social History     Socioeconomic History    Marital status:      Spouse name: Not on file    Number of children: 1    Years of education: Not on file    Highest education level: Not on file   Occupational History    Occupation:    Tobacco Use    Smoking status: Never    Smokeless tobacco: Never   Substance and Sexual Activity    Alcohol use: No     Alcohol/week: 0.0 standard drinks of alcohol    Drug use: No    Sexual activity: Yes     Partners: Female     Birth control/protection: None   Other Topics Concern    Parent/sibling w/ CABG, MI or angioplasty before 65F 55M? Not Asked   Social History Narrative    8/18/2020 --Lives in [place] with his wife and 15 yo son (Raghu).  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     Social Determinants of Health     Financial Resource Strain: Not on file   Food Insecurity: Not on file   Transportation Needs: Not on file   Physical Activity: Not on file   Stress: Not on file   Social Connections: Not on file   Intimate Partner Violence: Not on file   Housing Stability: Not on file            Medications:     Current Outpatient Medications   Medication    famotidine (PEPCID) 20 MG tablet    albuterol (PROAIR HFA/PROVENTIL HFA/VENTOLIN HFA) 108 (90 Base) MCG/ACT inhaler    amylase-lipase-protease (ZENPEP) 03321-00576 units  "CPEP    azithromycin (ZITHROMAX) 500 MG tablet    aztreonam lysine (CAYSTON) 75 MG SOLR    Cholecalciferol (VITAMIN D) 2000 UNITS CAPS    Continuous Blood Gluc  (DEXCOM G6 ) SAVI    Continuous Blood Gluc  (DEXCOM G6 ) SAVI    Continuous Blood Gluc  (FREESTYLE SHARMAINE 2 READER) SAVI    Continuous Blood Gluc Sensor (DEXCOM G6 SENSOR) MISC    Continuous Blood Gluc Sensor (DEXCOM G6 SENSOR) MISC    Continuous Blood Gluc Sensor (FREESTYLE SHARMAINE 2 SENSOR) MISC    Continuous Blood Gluc Transmit (DEXCOM G6 TRANSMITTER) MISC    Continuous Blood Gluc Transmit (DEXCOM G6 TRANSMITTER) MISC    elexacaftor-tezacaftor-ivacaftor & ivacaftor (TRIKAFTA) 100-50-75 & 150 MG tablet pack    hydrochlorothiazide (MICROZIDE) 12.5 MG capsule    multivitamin CF formula (AQUADEKS) chewable tablet    pantoprazole (PROTONIX) 40 MG EC tablet    Norah Altera Nebulizer System MIS    Respiratory Therapy Supplies (NORAH ALTERA NEBULIZER HANDSET) [facility]    Respiratory Therapy Supplies (NORAH ALTERA NEBULIZER HANDSET) MISC    sodium chloride inhalant 7 % NEBU neb solution     No current facility-administered medications for this visit.            Physical Exam:   BP (!) 144/87   Pulse 69   Ht 1.727 m (5' 8\")   Wt 74.4 kg (164 lb)   SpO2 97%   BMI 24.94 kg/m      Constitutional:   Awake, alert and in no apparent distress     Eyes:   nonicteric     ENT:   oral mucosa moist without lesions, normal tm bilaterally, bilateral mucosa     Neck:   Supple without supraclavicular or cervical lymphadenopathy     Lungs:   Good air flow.  No crackles. No rhonchi.  No wheezes.     Cardiovascular:   Normal S1 and S2.  RRR.  No murmur, gallop or rub.     Abdomen:   NABS, soft, nontender, nondistended.      Musculoskeletal:   No edema, digital clubbing present     Neurologic:   Alert and conversant.     Skin:   Warm, dry.  No rash on limited exam.             Data:   All laboratory and imaging data reviewed.      Results from the " last week:  Recent Results (from the past 168 hour(s))   General PFT Lab (Please always keep checked)    Collection Time: 07/25/23  8:36 AM   Result Value Ref Range    FVC-Pred 4.25 L    FVC-Pre 3.99 L    FVC-%Pred-Pre 93 %    FEV1-Pre 2.81 L    FEV1-%Pred-Pre 83 %    FEV1FVC-Pred 79 %    FEV1FVC-Pre 71 %    FEFMax-Pred 9.25 L/sec    FEFMax-Pre 8.45 L/sec    FEFMax-%Pred-Pre 91 %    FEF2575-Pred 2.98 L/sec    FEF2575-Pre 1.73 L/sec    PRM7753-%Pred-Pre 58 %    ExpTime-Pre 9.93 sec    FIFMax-Pre 7.11 L/sec    FEV1FEV6-Pred 80 %    FEV1FEV6-Pre 74 %       PFT interpretation:   Spirometry interpretation:  The spirometry is normal.  When compared to 5/11/2022, the FEV1 and FVC have increased.  The testing meets ATS criteria.    Severity Z-score*   Normal > -1.645   Mild -1.65 to -2.5   Moderate -2.5 to -4   Severe < -4   * accounts for sex, age, height, ancestry     Use z-score to assess airflow obstruction, restriction and DLCO  - FEV1 z-score for airflow obstruction  - TLC z-score for restriction  - DLCO z-score for diffusion defect    Pulmonary exacerbation: absent    50 minutes spent by me on the date of the encounter doing chart review, history and exam, documentation and further activities per the note  Time documented is excluding time spent for PFT interpretation.

## 2023-07-24 NOTE — PATIENT INSTRUCTIONS
Cystic Fibrosis Self-Care Plan    RECOMMENDATIONS:   Help us provide the best possible care. If you receive a questionnaire from the CF Foundation about your clinic experience today please fill it out.  It should take less than 5 minutes. Let us know what we are doing well and how we can improve.  Michele, It was great to see you today.  The plan from today:  --cardiovascular risk factors--diabetes, hypertension and cholesterol management  --follow up with Dr. Obrien  --sleep health is important  --hepatic panel at [place] in the next couple of weeks  Great job with self cares    YOUR GOAL:  Stay safe and well.  Enjoy the summer!      Cystic Fibrosis :    Suzie Raya  [phone]  [facility] Nurse line:  Jerica MATA   [phone]     [facility] [place] [facility] Fax Number:      [phone]         Cystic Fibrosis Respiratory Therapists:   Yaz Barragan                          [phone]          Consuelo Bonilla   [phone]  Cystic Fibrosis Dietitians:              Willa Romero              [phone]                            Alondra Vitale                        [phone]   Cystic Fibrosis Diabetes Nurse:    Livia Mondragon               [phone]    Cystic Fibrosis Social Workers:     Kaykay Vieyra               [phone]                     Rayna Díaz               [phone]  Cystic Fibrosis Pharmacists:           Lachelle Mojica                              [phone] (pager)         Liset Cee      [phone]   Cystic Fibrosis Genetic Counselor:   Charisse Reveles    [phone]    [facility] [place] website:  [email or web address]    We have recently learned about an albuterol neb solution shortage due to a manufacturing delay. There is still a small supply coming in but not enough to meet the current demand. We do not yet have an estimate for when this will become widely available again.    We our asking our CF community to do the  following:    Please take time to check your supply of albuterol neb solution at home. We recommend keeping at least a 2-week supply of albuterol nebs at home in case of illness.    2.  If you have an albuterol inhaler at home, you can use 4 puffs of the inhaler with a spacer in place of the nebulized albuterol at the start of your treatments. It is important to use a spacer for the best technique. If you do not have a spacer at home or have questions on technique, we will be happy to review and send one to your home address. Please also take a moment to check that your albuterol HFA inhaler is available and not .  inhalers may be less effective as the medication loses its potency or power. In some instances your team may suggest another alternative instead of an albuterol inhaler.    3.  Please take care in requesting refills. Albuterol neb solution is a life-saving medication for patients having severe asthma attacks and other emergency respiratory conditions. Let s work together to make sure that albuterol neb solution is available to those who need it urgently.    Reach out to your care team with questions and to confirm your planned alternative for albuterol nebs. WIDIPhart will be the fastest way to connect. If possible, please reserve the nursing line for more urgent concerns as we are short on nursing staff.    Here are some reputable sites with more information:    https://www.cidrap.[facility]/resilient-drug-supply/us-liquid-albuterol-zmnktmhy-qxonmzrn-lcljpd-after-major-supplier-shuts-down    https://www.[employer]//health/albuterol-shortage    https://www.ashp.org/drug-shortages/current-shortages/drug-shortage-detail.aspx?qa=942&loginreturnUrl=SSOCheckOnly       MRN: [id number]   Clinic Date: 2023   Patient: Michele Altamirano     Annual Studies:   IGG   Date Value Ref Range Status   2018 883 695 - 1,620 mg/dL Final     Immunoglobulin G   Date Value Ref Range Status   2022  779 610 - 1,616 mg/dL Final     Insulin   Date Value Ref Range Status   02/09/2018 36.2 (H) 3 - 25 mU/L Final     Comment:     Starting 7/13/2017, insulin results will decrease by approximately 37%   compared to insulin results reported in EPIC between (12/16/2016-7/12/2017),   and should be interpreted accordingly. Insulin results reported prior to   12/16/16 are comparable to current insulin results and therefore no adjustment   is needed.       There are no preventive care reminders to display for this patient.    Pulmonary Function Tests  FEV1: amount of air you can blow out in 1 second  FVC: total amount of air you can take in and blow out    Your Goals:             Latest Ref Rng & Units 5/11/2022     8:36 AM   PFT   FVC L 4.03  P   FEV1 L 2.78  P   FVC% % 86  P   FEV1% % 76  P      P Preliminary result          Airway Clearance: The Most Important Way to Keep Your Lungs Healthy  Vest Settings:   Hill-Rom Frequencies: 8, 9, 10 Pressure 10 Then, Frequencies 18, 19, 20 Pressure 6     RespirTech: Quick Start with Pressure of     Do each frequency for 5 minutes; Deflate vest after each frequency & cough 3 times before beginning the next setting.    Vest and Neb Therapy should be done 1 times/day.    Good Nutrition Can Improve Lung Function and Overall Health    Take ALL of your vitamins with food    Take 1/2 of your enzymes before EVERY meal/snack and the other 1/2 mid-meal/snack    Wt Readings from Last 3 Encounters:   11/22/22 77.1 kg (170 lb)   05/11/22 74.7 kg (164 lb 10.9 oz)   02/22/22 77.9 kg (171 lb 11.8 oz)       There is no height or weight on file to calculate BMI.         National CF Foundation Recommendations for BMI in CF Adults: Women: at least 22 Men: at least 23        Controlling Blood Sugars Helps Prevent Lung Infections & Improves Nutrition  Test blood sugar:    In the morning before eating (goal is )    2 hours after a meal (goal is less than 150)    When pre-meal glucose is  greater than 150 add correction    At bedtime (if less than 100 eat a snack with 15 grams of carbohydrates  Last A1C Results:   Hemoglobin A1C   Date Value Ref Range Status   05/11/2022 5.9 (H) 0.0 - 5.6 % Final     Comment:     Normal <5.7%   Prediabetes 5.7-6.4%    Diabetes 6.5% or higher     Note: Adopted from ADA consensus guidelines.   11/03/2020 6.1 (A) 0 - 5.6 % Final         If diabetic, measure A1C every 6 months. Goal: Under 7%    Staying Healthy  Research:  If you are interested in learning about research opportunities or have questions, please contact the CF Research Team at [phone] or [email or web address].    CF Foundation:  Compass is a personalized resource service to help you with the insurance, financial, legal and other issues you are facing.  It's free, confidential and available to anyone with CF.  Ask your  for more information or contact Compass directly at 945-COMPASS ([phone]) or compass@cff.org, or learn more at cff.org/compass.

## 2023-07-25 ENCOUNTER — ALLIED HEALTH/NURSE VISIT (OUTPATIENT)
Dept: CARE COORDINATION | Facility: CLINIC | Age: 58
End: 2023-07-25

## 2023-07-25 ENCOUNTER — OFFICE VISIT (OUTPATIENT)
Dept: PULMONOLOGY | Facility: CLINIC | Age: 58
End: 2023-07-25
Attending: INTERNAL MEDICINE
Payer: COMMERCIAL

## 2023-07-25 VITALS
SYSTOLIC BLOOD PRESSURE: 144 MMHG | OXYGEN SATURATION: 97 % | WEIGHT: 164 LBS | HEIGHT: 68 IN | HEART RATE: 69 BPM | DIASTOLIC BLOOD PRESSURE: 87 MMHG | BODY MASS INDEX: 24.86 KG/M2

## 2023-07-25 DIAGNOSIS — Z71.9 ENCOUNTER FOR COUNSELING: Primary | ICD-10-CM

## 2023-07-25 DIAGNOSIS — E84.0 CYSTIC FIBROSIS WITH PULMONARY MANIFESTATIONS (H): Primary | ICD-10-CM

## 2023-07-25 DIAGNOSIS — E84.9 CF (CYSTIC FIBROSIS) (H): ICD-10-CM

## 2023-07-25 DIAGNOSIS — K21.9 GASTROESOPHAGEAL REFLUX DISEASE WITHOUT ESOPHAGITIS: ICD-10-CM

## 2023-07-25 LAB
EXPTIME-PRE: 9.93 SEC
FEF2575-%PRED-PRE: 58 %
FEF2575-PRE: 1.73 L/SEC
FEF2575-PRED: 2.98 L/SEC
FEFMAX-%PRED-PRE: 91 %
FEFMAX-PRE: 8.45 L/SEC
FEFMAX-PRED: 9.25 L/SEC
FEV1-%PRED-PRE: 83 %
FEV1-PRE: 2.81 L
FEV1FEV6-PRE: 74 %
FEV1FEV6-PRED: 80 %
FEV1FVC-PRE: 71 %
FEV1FVC-PRED: 79 %
FIFMAX-PRE: 7.11 L/SEC
FVC-%PRED-PRE: 93 %
FVC-PRE: 3.99 L
FVC-PRED: 4.25 L

## 2023-07-25 PROCEDURE — G0463 HOSPITAL OUTPT CLINIC VISIT: HCPCS | Performed by: INTERNAL MEDICINE

## 2023-07-25 PROCEDURE — 94375 RESPIRATORY FLOW VOLUME LOOP: CPT | Performed by: INTERNAL MEDICINE

## 2023-07-25 PROCEDURE — 87070 CULTURE OTHR SPECIMN AEROBIC: CPT | Performed by: INTERNAL MEDICINE

## 2023-07-25 PROCEDURE — 99213 OFFICE O/P EST LOW 20 MIN: CPT | Performed by: INTERNAL MEDICINE

## 2023-07-25 PROCEDURE — 99215 OFFICE O/P EST HI 40 MIN: CPT | Mod: 25 | Performed by: INTERNAL MEDICINE

## 2023-07-25 RX ORDER — FAMOTIDINE 20 MG/1
20 TABLET, FILM COATED ORAL DAILY
Qty: 30 TABLET | Refills: 0 | COMMUNITY
Start: 2023-07-25 | End: 2024-04-09

## 2023-07-25 NOTE — PROGRESS NOTES
Adult Cystic Fibrosis Program  Social Work Clinic Consult    Data:   Met with Michele to introduce self as new  and review role and supports available. Michele was unaccompanied for his regular clinic visit and did not have any questions or concerns for the  at this time.       Intervention:  -Introduction to SW and SW role      Assessment: Michele appeared to be open to the  introduction but did seem to be in a hurry to leave clinic. Conversation was brief but Michele seemed to understand how to get in contact and did not express any concerns at this time.     Plan:   -Continue to follow through regular clinic consult.  -Continue to follow for any psychosocial needs that may arise.  -Complete full psychosocial assessment annually.       Tiffany Mustafa [facility]  Adult Cystic Fibrosis   Ph: [phone], Pager: [phone]

## 2023-07-25 NOTE — NURSING NOTE
"Michele Altamirano is a 57 year old year old who is being seen for Cystic Fibrosis (3 month CF follow up )      Medications reviewed and Vital signs taken.    Specimen Collection Type: Throat Swab    Order(s) placed: CF Aerobic Bacterial    *IF AFB order placed - please enter \"PRIORITIZE AFB\" to order comments.       No results found for: ACIDFAST      Lab Results   Component Value Date    AFBSMS  01/12/2016     Negative for acid fast bacteria  Less than 5ml of specimen received.  A minimum of 5 mL of sputum or fluid is recommended for recovery of acid fast   bacilli (AFB).  Volumes less than 5 mL are suboptimal and may compromise   recovery of AFB from culture.  Assayed at [facility] [address]       Vitals were taken and medications were reconciled.     Cherelle Abreu RMA  8:50 AM        "

## 2023-07-25 NOTE — NURSING NOTE
Local lab orders sent to [facility]. Plan to draw in a few weeks.    TRINH Barney

## 2023-07-25 NOTE — PROGRESS NOTES
Pt unable to stay for nutrition visit following provider encounter today, will attempt at next CF clinic appt. AVS contains contact information for in-between visit communication as needed.       Alondra Vitale MS, RD, LD (pager [phone])  Cystic Fibrosis/Lung Transplant Dietitian      -Available Mon-Thurs 8 AM-4:30 PM. On Fridays please contact pager [phone] (Willa Romero RD) and on weekends/holidays contact coverage dietitian at pager [phone] (inpatient use only).

## 2023-07-25 NOTE — LETTER
7/25/2023         RE: Michele Altamirano  [address] [facility] [address]        Dear Colleague,    Thank you for referring your patient, Michele Altamirano, to the [facility] [place]. Please see a copy of my visit note below.    [facility]  July 25, 2023         Assessment and Plan:   Michele Altamirano is a 57 year old male with cystic fibrosis.    1. CF lung disease with normal spirometry: Michele reports from a pulmonary point of view that he is doing well.  He has little in the way of pulmonary symptomatology.  He is able to exercise without limitation related to his lungs.  He historically has grown out Pseudomonas in his sputum.  His pulmonary function test today do show improvement compared to May of last year.  Michele does not participate in bronchial drainage or nebulized therapies at this time given his lack of symptomatology.  At this time I recommended to Michele:  -- He should certainly continue to do his vest and nebulized therapy if he were to have any worsening symptomatology  -- He is no longer consistently doing an inhalational antibiotic and I think that is appropriate  -- He should continue to remain active is his form of bronchial drainage therapy    2. Pancreatic Insufficiency/GI: Michele has no new symptoms consistent with worsening malabsorption.  He historically has had some difficulty with gastroesophageal reflux, so has remained on a PPI and H2 blocker.  He is considering coming off the H2 blocker to see if he still has control of his symptoms.  I did discuss with them how you should certainly reinitiate it for any change in symptomatology.  - continue the present dose of pancreatic enzymes  - continue vitamin supplementation.    3.  CFTR modulator therapy: Michele is on Trikafta and tolerating well.  He is due for an hepatic panel which she will have done locally in the next couple weeks.    4.  Abnormal  glucose tolerance: Michele has a history of abnormal glucose tolerance.  He is doing some continuous glucose monitoring every few weeks.  He describes blood sugars averaging in the 140s.  He is due to follow-up with Dr. Obrien and endocrinology.  I encouraged that he make an appointment.    5.  Osteoarthritis: Michele reports that he is now getting some steroid injections to some of his hand joints.  He still is struggling with some back discomfort.  I have encouraged him for to follow-up for second opinion.    6.  Psychosocial: Michele continues to work full-time.  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  His son is doing well at [employer].    Annual studies due: 5/2023  Recent Labs   Lab Test 05/11/22  1107        Siobhan Munoz MD MPH  Associate Professor of Medicine  Pulmonary, Allergy, Critical Care and Sleep Medicine        Interval History:     Michele reports little in the way of cough or sputum production.  He denies any chest congestion or shortness of breath.  He does not participate in bronchial drainage or nebulized therapies at this time.         Review of Systems:     CONSTITUTIONAL: no fever, no chills, no sweats, no change in weight, no change in energy--plenty, no change in appetite--great    INTEGUMENTARY/SKIN: no rash, no obvious new lesions    ENT/MOUTH: no sore throat, no new sinus pain, no new nasal drainage, no new nasal congestion, no ear ringing     RESPIRATORY: see interval history    CV: no chest pain, no palpitations, no peripheral edema    GI: no nausea, no vomiting, no change in stools, no fatty stools, controlled GERD    : negative urinary    MUSCULOSKELETAL: See above    ENDOCRINE: See above    NEURO:  No headache    SLEEP: no issues--nothing out of the ordinary for him, stays up late and has difficulty falling asleep.  Realize this is related to his device use.  Tries to get 6 hours of sleep at night.    PSYCHIATRIC: mood stable--fine          Past Medical and  Surgical History:     Past Medical History:   Diagnosis Date     CAP (community acquired pneumonia)     2011     CF (cystic fibrosis) (H)     diagnosed 1969, malnutrition, staph empyema     Chronic knee pain      Diverticular disease 2018    see on colonoscopy     GERD (gastroesophageal reflux disease)     egd in the past     Pancreatic insufficiency      Past Surgical History:   Procedure Laterality Date     ENDOSCOPIC SINUS SURGERY      removal of mucocele behind eye, 1992     OPTICAL TRACKING SYSTEM ENDOSCOPIC SINUS SURGERY Bilateral 11/28/2016    Procedure: OPTICAL TRACKING SYSTEM ENDOSCOPIC SINUS SURGERY;  Surgeon: Harriett Cosby MD;  Location:  OR           Family History:     Family History   Problem Relation Age of Onset     Lipids Father      Cardiovascular Father      Diabetes Maternal Grandmother         type 2            Social History:     Social History     Socioeconomic History     Marital status:      Spouse name: Not on file     Number of children: 1     Years of education: Not on file     Highest education level: Not on file   Occupational History     Occupation:    Tobacco Use     Smoking status: Never     Smokeless tobacco: Never   Substance and Sexual Activity     Alcohol use: No     Alcohol/week: 0.0 standard drinks of alcohol     Drug use: No     Sexual activity: Yes     Partners: Female     Birth control/protection: None   Other Topics Concern     Parent/sibling w/ CABG, MI or angioplasty before 65F 55M? Not Asked   Social History Narrative    8/18/2020 --Lives in [place] with his wife and 15 yo son (Raghu).  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     Social Determinants of Health     Financial Resource Strain: Not on file   Food Insecurity: Not on file   Transportation Needs: Not on file   Physical Activity: Not on file   Stress: Not on file   Social Connections: Not on file   Intimate Partner Violence: Not on  "file   Housing Stability: Not on file            Medications:     Current Outpatient Medications   Medication     famotidine (PEPCID) 20 MG tablet     albuterol (PROAIR HFA/PROVENTIL HFA/VENTOLIN HFA) 108 (90 Base) MCG/ACT inhaler     amylase-lipase-protease (ZENPEP) 25220-14542 units CPEP     azithromycin (ZITHROMAX) 500 MG tablet     aztreonam lysine (CAYSTON) 75 MG SOLR     Cholecalciferol (VITAMIN D) 2000 UNITS CAPS     Continuous Blood Gluc  (DEXCOM G6 ) SAVI     Continuous Blood Gluc  (DEXCOM G6 ) SAVI     Continuous Blood Gluc  (FREESTYLE SHARMAINE 2 READER) SAVI     Continuous Blood Gluc Sensor (DEXCOM G6 SENSOR) MISC     Continuous Blood Gluc Sensor (DEXCOM G6 SENSOR) MISC     Continuous Blood Gluc Sensor (FREESTYLE SHARMAINE 2 SENSOR) MISC     Continuous Blood Gluc Transmit (DEXCOM G6 TRANSMITTER) MISC     Continuous Blood Gluc Transmit (DEXCOM G6 TRANSMITTER) MISC     elexacaftor-tezacaftor-ivacaftor & ivacaftor (TRIKAFTA) 100-50-75 & 150 MG tablet pack     hydrochlorothiazide (MICROZIDE) 12.5 MG capsule     multivitamin CF formula (AQUADEKS) chewable tablet     pantoprazole (PROTONIX) 40 MG EC tablet     Norah Altera Nebulizer System [facility]     Respiratory Therapy Supplies (NORAH ALTERA NEBULIZER HANDSET) MIS     Respiratory Therapy Supplies (NORAH ALTERA NEBULIZER HANDSET) MISC     sodium chloride inhalant 7 % NEBU neb solution     No current facility-administered medications for this visit.            Physical Exam:   BP (!) 144/87   Pulse 69   Ht 1.727 m (5' 8\")   Wt 74.4 kg (164 lb)   SpO2 97%   BMI 24.94 kg/m      Constitutional:   Awake, alert and in no apparent distress     Eyes:   nonicteric     ENT:   oral mucosa moist without lesions, normal tm bilaterally, bilateral mucosa     Neck:   Supple without supraclavicular or cervical lymphadenopathy     Lungs:   Good air flow.  No crackles. No rhonchi.  No wheezes.     Cardiovascular:   Normal S1 and S2.  RRR.  No " murmur, gallop or rub.     Abdomen:   NABS, soft, nontender, nondistended.      Musculoskeletal:   No edema, digital clubbing present     Neurologic:   Alert and conversant.     Skin:   Warm, dry.  No rash on limited exam.             Data:   All laboratory and imaging data reviewed.      Results from the last week:  Recent Results (from the past 168 hour(s))   General PFT Lab (Please always keep checked)    Collection Time: 07/25/23  8:36 AM   Result Value Ref Range    FVC-Pred 4.25 L    FVC-Pre 3.99 L    FVC-%Pred-Pre 93 %    FEV1-Pre 2.81 L    FEV1-%Pred-Pre 83 %    FEV1FVC-Pred 79 %    FEV1FVC-Pre 71 %    FEFMax-Pred 9.25 L/sec    FEFMax-Pre 8.45 L/sec    FEFMax-%Pred-Pre 91 %    FEF2575-Pred 2.98 L/sec    FEF2575-Pre 1.73 L/sec    TSX4478-%Pred-Pre 58 %    ExpTime-Pre 9.93 sec    FIFMax-Pre 7.11 L/sec    FEV1FEV6-Pred 80 %    FEV1FEV6-Pre 74 %       PFT interpretation:   Spirometry interpretation:  The spirometry is normal.  When compared to 5/11/2022, the FEV1 and FVC have increased.  The testing meets ATS criteria.    Severity Z-score*   Normal > -1.645   Mild -1.65 to -2.5   Moderate -2.5 to -4   Severe < -4   * accounts for sex, age, height, ancestry     Use z-score to assess airflow obstruction, restriction and DLCO  - FEV1 z-score for airflow obstruction  - TLC z-score for restriction  - DLCO z-score for diffusion defect    Pulmonary exacerbation: absent    50 minutes spent by me on the date of the encounter doing chart review, history and exam, documentation and further activities per the note  Time documented is excluding time spent for PFT interpretation.      Pt unable to stay for nutrition visit following provider encounter today, will attempt at next CF clinic appt. AVS contains contact information for in-between visit communication as needed.       Alondra Vitale MS, RD, LD (pager [phone])  Cystic Fibrosis/Lung Transplant Dietitian      -Available Mon-Thurs 8 AM-4:30 PM. On Fridays please contact  pager [phone] (Willa Romero RD) and on weekends/holidays contact coverage dietitian at pager [phone] (inpatient use only).       Again, thank you for allowing me to participate in the care of your patient.        Sincerely,        Siobhan Munoz MD

## 2023-07-30 LAB
BACTERIA SPEC CULT: ABNORMAL
BACTERIA SPEC CULT: ABNORMAL

## 2023-09-25 DIAGNOSIS — E84.9 CF (CYSTIC FIBROSIS) (H): ICD-10-CM

## 2023-09-25 RX ORDER — ELEXACAFTOR, TEZACAFTOR, AND IVACAFTOR 100-50-75
KIT ORAL
Qty: 84 TABLET | Refills: 1 | Status: SHIPPED | OUTPATIENT
Start: 2023-09-25 | End: 2023-11-22

## 2023-10-02 DIAGNOSIS — E84.9 CYSTIC FIBROSIS (H): ICD-10-CM

## 2023-10-02 RX ORDER — PANCRELIPASE LIPASE, PANCRELIPASE PROTEASE, PANCRELIPASE AMYLASE 20000; 63000; 84000 [USP'U]/1; [USP'U]/1; [USP'U]/1
CAPSULE, DELAYED RELEASE ORAL
Qty: 2900 CAPSULE | Refills: 3 | Status: SHIPPED | OUTPATIENT
Start: 2023-10-02 | End: 2024-06-19

## 2023-10-24 DIAGNOSIS — E10.9 TYPE 1 DIABETES MELLITUS (H): Primary | ICD-10-CM

## 2023-10-24 RX ORDER — ACYCLOVIR 400 MG/1
TABLET ORAL
Qty: 1 EACH | Refills: 0 | Status: SHIPPED | OUTPATIENT
Start: 2023-10-24

## 2023-10-24 RX ORDER — ACYCLOVIR 400 MG/1
TABLET ORAL
Qty: 3 EACH | Refills: 5 | Status: SHIPPED | OUTPATIENT
Start: 2023-10-24 | End: 2024-05-02

## 2023-11-21 DIAGNOSIS — E84.9 CF (CYSTIC FIBROSIS) (H): ICD-10-CM

## 2023-11-22 RX ORDER — ELEXACAFTOR, TEZACAFTOR, AND IVACAFTOR 100-50-75
KIT ORAL
Qty: 84 TABLET | Refills: 3 | Status: SHIPPED | OUTPATIENT
Start: 2023-11-22 | End: 2024-03-01

## 2023-12-02 ENCOUNTER — HEALTH MAINTENANCE LETTER (OUTPATIENT)
Age: 58
End: 2023-12-02

## 2023-12-26 ENCOUNTER — TELEPHONE (OUTPATIENT)
Dept: PULMONOLOGY | Facility: CLINIC | Age: 58
End: 2023-12-26
Payer: COMMERCIAL

## 2023-12-26 NOTE — TELEPHONE ENCOUNTER
PA Initiation    [facility]: [facility]  Ins[facility] Company:    Pharmacy Filling the Rx:    Filling Pharmacy Phone:    Filling Pharmacy Fax:    Start Date: 12/26/2023

## 2023-12-28 NOTE — TELEPHONE ENCOUNTER
Prior Authorization Approval    Medication: ZENPEP PO  Authorization Effective Date: 12/27/2023  Authorization Expiration Date: 12/25/2024  Approved Dose/Quantity: 2900 for 87 ds   Reference #: [id number]   Insurance Company: Other (see comments)Comment:  [employer] [place]  Expected CoPay: $    CoPay Card Available:      Financial Assistance Needed:   Which Pharmacy is filling the prescription: [facility] - [address]  Pharmacy Notified:   Patient Notified:

## 2024-01-05 DIAGNOSIS — E84.0 CYSTIC FIBROSIS WITH PULMONARY MANIFESTATIONS (H): ICD-10-CM

## 2024-01-05 DIAGNOSIS — K86.89 PANCREATIC INSUFFICIENCY: ICD-10-CM

## 2024-01-05 DIAGNOSIS — E55.9 VITAMIN D DEFICIENCY: ICD-10-CM

## 2024-01-05 DIAGNOSIS — K21.9 GASTROESOPHAGEAL REFLUX DISEASE WITHOUT ESOPHAGITIS: ICD-10-CM

## 2024-01-08 RX ORDER — PANTOPRAZOLE SODIUM 40 MG/1
40 TABLET, DELAYED RELEASE ORAL 2 TIMES DAILY
Qty: 180 TABLET | Refills: 3 | Status: SHIPPED | OUTPATIENT
Start: 2024-01-08

## 2024-01-22 DIAGNOSIS — E84.0 CYSTIC FIBROSIS WITH PULMONARY MANIFESTATIONS (H): ICD-10-CM

## 2024-01-22 DIAGNOSIS — K21.9 GASTROESOPHAGEAL REFLUX DISEASE WITHOUT ESOPHAGITIS: ICD-10-CM

## 2024-01-22 DIAGNOSIS — K86.89 PANCREATIC INSUFFICIENCY: ICD-10-CM

## 2024-01-22 DIAGNOSIS — E55.9 VITAMIN D DEFICIENCY: ICD-10-CM

## 2024-01-22 RX ORDER — AZITHROMYCIN 500 MG/1
TABLET, FILM COATED ORAL
Qty: 39 TABLET | Refills: 3 | Status: SHIPPED | OUTPATIENT
Start: 2024-01-22

## 2024-03-01 ENCOUNTER — TELEPHONE (OUTPATIENT)
Dept: PULMONOLOGY | Facility: CLINIC | Age: 59
End: 2024-03-01
Payer: COMMERCIAL

## 2024-03-01 ENCOUNTER — MYC MEDICAL ADVICE (OUTPATIENT)
Dept: PULMONOLOGY | Facility: CLINIC | Age: 59
End: 2024-03-01
Payer: COMMERCIAL

## 2024-03-01 ENCOUNTER — MYC REFILL (OUTPATIENT)
Dept: PULMONOLOGY | Facility: CLINIC | Age: 59
End: 2024-03-01
Payer: COMMERCIAL

## 2024-03-01 DIAGNOSIS — E84.9 CF (CYSTIC FIBROSIS) (H): ICD-10-CM

## 2024-03-01 RX ORDER — ELEXACAFTOR, TEZACAFTOR, AND IVACAFTOR 100-50-75
KIT ORAL
Qty: 84 TABLET | Refills: 1 | Status: SHIPPED | OUTPATIENT
Start: 2024-03-01 | End: 2024-04-09

## 2024-03-01 NOTE — TELEPHONE ENCOUNTER
RN called patient to discuss his Trikafta lab. He had sent results via [employer] which were not entered into the system because the actual results were not sent from the local lab in [place]. RN let pharmacists and Dr Munoz know about those labs and a 2 month bridge supply of Trikafta was sent. RN let pt know that Dr Munoz would like local labs this month in order to discuss it at next visit in April. Pt is agreeable. Order sent to [place]. Pt will let us know when he gets labs so that we can follow up with results.  Blanca Mullins RN

## 2024-03-01 NOTE — TELEPHONE ENCOUNTER
Per discussion with Dr. Siobhan Munoz MD, Michele to have local Trikafta labs prior to upcoming appt on 4/9/24. Dr. Siobhan Munoz MD approves two month Trikafta supply to bridge to next appointment.      Lachelle Mojica, PharmD   Medication Therapy Management   Cystic Fibrosis Pharmacist

## 2024-03-13 DIAGNOSIS — E10.9 TYPE 1 DIABETES MELLITUS (H): ICD-10-CM

## 2024-03-19 ENCOUNTER — MYC MEDICAL ADVICE (OUTPATIENT)
Dept: ENDOCRINOLOGY | Facility: CLINIC | Age: 59
End: 2024-03-19
Payer: COMMERCIAL

## 2024-03-19 ENCOUNTER — TELEPHONE (OUTPATIENT)
Dept: ENDOCRINOLOGY | Facility: CLINIC | Age: 59
End: 2024-03-19
Payer: COMMERCIAL

## 2024-03-19 RX ORDER — ACYCLOVIR 400 MG/1
TABLET ORAL
OUTPATIENT
Start: 2024-03-19

## 2024-03-19 NOTE — TELEPHONE ENCOUNTER
Caity Obrien MD Teats, Angela J, RN; [facility] Endocrinology Adult Csc 2 hours ago (12:46 PM)     AM  Needs clinic appointment for further refills.            Medication Refill (DEXCOM_G7 SENSOR)  (Newest Message First)  View All Conversations on this Encounter  Anaid Crook  [facility] Endocrinology Adult [facility] 29 minutes ago (2:53 PM)     GH  Pt will call back to schedule   Anaid Crook on 3/19/2024 at 2:53 PM            Note      Lucero Puente, RN  Clinic [id number] 42 minutes ago (2:40 PM)     AT  Needs office visit.  Please call Pt to schedule.

## 2024-03-19 NOTE — TELEPHONE ENCOUNTER
Sent Mychart (1st Attempt) and Patient Contacted for the patient to call back and schedule the following:    Appointment type: return cystic fibrosis   Provider: krista   Return date: first available   Specialty phone number: [phone] or [phone]  Additional appointment(s) needed:   Additonal Notes:  CF DIABETES ADULT (EPIC DEPARTMENT)     Spoke with pt but was not a good time to schedule and will call back to schedule     Anaid Crook on 3/19/2024 at 2:53 PM

## 2024-03-19 NOTE — TELEPHONE ENCOUNTER
DEXCOM_G7 SENSOR   Last Written Prescription Date:  10/24/2023  Last Fill Quantity: 3,   # refills: 5  Last Office Visit : 12/13/2022  Future Office visit:  None    Routing refill request to provider for review/approval because:  Second Request  Pt needs updated office visit.  Last seen Dec 2022  Refer clinic and Provider for review     Lucero Puente RN  Central Triage Red Flags/Med Refills

## 2024-04-05 ENCOUNTER — CLINICAL UPDATE (OUTPATIENT)
Dept: PHARMACY | Facility: CLINIC | Age: 59
End: 2024-04-05
Payer: COMMERCIAL

## 2024-04-05 DIAGNOSIS — E84.9 CYSTIC FIBROSIS (H): Primary | ICD-10-CM

## 2024-04-05 PROCEDURE — 99207 PR NO CHARGE LOS: CPT | Performed by: PHARMACIST

## 2024-04-08 NOTE — PATIENT INSTRUCTIONS
Cystic Fibrosis Self-Care Plan    RECOMMENDATIONS:   Help us provide the best possible care. If you receive a questionnaire from the CF Foundation about your clinic experience today please fill it out.  It should take less than 5 minutes. Let us know what we are doing well and how we can improve.  Michele, It was great to see you today.  The plan from today:  --colonoscopy this year  --DEXA scan at next visit  Great job with self cares!      YOUR GOAL:  Stay safe and well.  Enjoy the spring weather!      Cystic Fibrosis :    Suzie Raya  [phone]  [place] [facility] Nurse line:  Jodi    [phone]     [facility] Fax Number:      [phone]         Cystic Fibrosis Respiratory Therapists:   Yaz Barragan                          [phone]          Consuelo Bonilla   [phone]  Cystic Fibrosis Dietitians:              Willa Romero              [phone]                            Alondra Vitale                        [phone]   Cystic Fibrosis Diabetes Nurse:    Livia Mondragon               [phone]    Cystic Fibrosis Social Workers:     Tiffany Mustafa              [phone]                     Rayna Díaz               [phone]  Cystic Fibrosis Pharmacists:           Lachelle Mojica                              [phone] (pager)         Liset Cee      [phone]   Cystic Fibrosis Genetic Counselor:   Charisse Reveles    [phone]    [place] [facility] website:  [email or web address]    We have recently learned about an albuterol neb solution shortage due to a manufacturing delay. There is still a small supply coming in but not enough to meet the current demand. We do not yet have an estimate for when this will become widely available again.    We our asking our CF community to do the following:    Please take time to check your supply of albuterol neb solution at home. We recommend keeping at least a 2-week supply of  albuterol nebs at home in case of illness.    2.  If you have an albuterol inhaler at home, you can use 4 puffs of the inhaler with a spacer in place of the nebulized albuterol at the start of your treatments. It is important to use a spacer for the best technique. If you do not have a spacer at home or have questions on technique, we will be happy to review and send one to your home address. Please also take a moment to check that your albuterol HFA inhaler is available and not .  inhalers may be less effective as the medication loses its potency or power. In some instances your team may suggest another alternative instead of an albuterol inhaler.    3.  Please take care in requesting refills. Albuterol neb solution is a life-saving medication for patients having severe asthma attacks and other emergency respiratory conditions. Let s work together to make sure that albuterol neb solution is available to those who need it urgently.    Reach out to your care team with questions and to confirm your planned alternative for albuterol nebs. [employer] will be the fastest way to connect. If possible, please reserve the nursing line for more urgent concerns as we are short on nursing staff.    Here are some reputable sites with more information:    https://www.cidrap.[facility].edu/resilient-drug-supply/us-liquid-albuterol-wykzqylr-irnrtfdy-fwsdxu-after-major-supplier-shuts-down    https://www.[employer].Medium//health/albuterol-shortage    https://www.ashp.org/drug-shortages/current-shortages/drug-shortage-detail.aspx?ff=879&loginreturnUrl=SSOCheckOnly       MRN: [id number]   Clinic Date: 2024   Patient: Michele Altamirano     Annual Studies:   IGG   Date Value Ref Range Status   2018 883 695 - 1,620 mg/dL Final     Immunoglobulin G   Date Value Ref Range Status   2022 779 610 - 1,616 mg/dL Final     Insulin   Date Value Ref Range Status   2018 36.2 (H) 3 - 25 mU/L Final     Comment:     Starting  7/13/2017, insulin results will decrease by approximately 37%   compared to insulin results reported in EPIC between (12/16/2016-7/12/2017),   and should be interpreted accordingly. Insulin results reported prior to   12/16/16 are comparable to current insulin results and therefore no adjustment   is needed.       There are no preventive care reminders to display for this patient.    Pulmonary Function Tests  FEV1: amount of air you can blow out in 1 second  FVC: total amount of air you can take in and blow out    Your Goals:             Latest Ref Rng & Units 7/25/2023     8:36 AM   PFT   FVC L 3.99  P   FEV1 L 2.81  P   FVC% % 93  P   FEV1% % 83  P      P Preliminary result          Airway Clearance: The Most Important Way to Keep Your Lungs Healthy  Vest Settings:   Hill-Rom Frequencies: 8, 9, 10 Pressure 10 Then, Frequencies 18, 19, 20 Pressure 6     [employer]Tech: Quick Start with Pressure of     Do each frequency for 5 minutes; Deflate vest after each frequency & cough 3 times before beginning the next setting.    Vest and Neb Therapy should be done 1 times/day.    Good Nutrition Can Improve Lung Function and Overall Health    Take ALL of your vitamins with food    Take 1/2 of your enzymes before EVERY meal/snack and the other 1/2 mid-meal/snack    Wt Readings from Last 3 Encounters:   07/25/23 74.4 kg (164 lb)   11/22/22 77.1 kg (170 lb)   05/11/22 74.7 kg (164 lb 10.9 oz)       There is no height or weight on file to calculate BMI.         National CF Foundation Recommendations for BMI in CF Adults: Women: at least 22 Men: at least 23        Controlling Blood Sugars Helps Prevent Lung Infections & Improves Nutrition  Test blood sugar:    In the morning before eating (goal is )    2 hours after a meal (goal is less than 150)    When pre-meal glucose is greater than 150 add correction    At bedtime (if less than 100 eat a snack with 15 grams of carbohydrates  Last A1C Results:   Hemoglobin A1C   Date  Value Ref Range Status   05/11/2022 5.9 (H) 0.0 - 5.6 % Final     Comment:     Normal <5.7%   Prediabetes 5.7-6.4%    Diabetes 6.5% or higher     Note: Adopted from ADA consensus guidelines.   11/03/2020 6.1 (A) 0 - 5.6 % Final         If diabetic, measure A1C every 6 months. Goal: Under 7%    Staying Healthy  Research:  If you are interested in learning about research opportunities or have questions, please contact the CF Research Team at [phone] or [email or web address].    CF Foundation:  Compass is a personalized resource service to help you with the insurance, financial, legal and other issues you are facing.  It's free, confidential and available to anyone with CF.  Ask your  for more information or contact Compass directly at 147-MOKKVLB ([phone]) or compass@cff.org, or learn more at cff.org/compass.

## 2024-04-08 NOTE — PROGRESS NOTES
[facility]  April 9, 2024         Assessment and Plan:   Michele Altamirano is a 58 year old male with cystic fibrosis.      Cystic fibrosis with normal spirometry:  Michele reports from a pulmonary point of view that he is doing well.  He has little in the way of pulmonary symptomatology.  He is able to exercise without limitation related to his lungs.  He historically has grown out Pseudomonas in his sputum.  His pulmonary function test today do show a slight decrease in FEV but an increase in FVC.  Michele does not participate in bronchial drainage or nebulized therapies at this time given his lack of symptomatology.  At this time I recommended to Michele:  -- He should certainly continue to do his vest and nebulized therapy if he were to have any worsening symptomatology  -- He is no longer consistently doing inhalational hypertonic saline  -- He should continue to remain active is his form of bronchial drainage therapy walking 2 miles per day     2. Pancreatic Insufficiency/GI: Michele has no new symptoms consistent with worsening malabsorption.  He historically has had some difficulty with gastroesophageal reflux, so has remained on a PPI and H2 blocker.  He is now off the H2 blocker and describes good control of his symptoms. Michele has longstanding abdominal fullness after eating.  This is unchanged.  - continue the present dose of pancreatic enzymes  - continue vitamin supplementation.     3.  CFTR modulator therapy: Michele is on Trikafta and tolerating well.  He has hepatic panel and CK completed 4/3/2024.     4.  Abnormal glucose tolerance: Michele has a history of abnormal glucose tolerance.  He uses a continuous glucose monitor.  He describes blood sugars averaging in the 130-135s.  He is managing his blood sugars with his diet.  I would encourage him to follow up with endocrine     5.  Osteoarthritis: Michele reports that he due to receive steroid injections to some of  his hand joints.  He still is struggling with some back discomfort from degenerative disc disease.       6.  Psychosocial: Michele continues to work full-time.  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  His son is doing well at [employer].  He discuss possibly going part time in the future.      7. HCM:  Michele will schedule colonoscopy at his local clinic.  He will obtain a DEXA at his next visit.    Immunizations: UTD  Colonoscopy: due now (2018)  Last DEXA: 2018  Last hepatic panel: 4/2024    Annual studies due: review  Recent Labs   Lab Test 05/11/22  1107        Siobhan Munoz MD MPH  Associate Professor of Medicine  Pulmonary, Allergy, Critical Care and Sleep Medicine        Interval History:     Michele denies cough, sputum or chest congestion.  He does not have shortness of breath.  He does not do vest therapy.         Review of Systems:     CONSTITUTIONAL: no fever, no chills, no sweats, no change in weight, no change in energy, no change in appetite    INTEGUMENTARY/SKIN: no rash, no obvious new lesions    ENT/MOUTH: no sore throat, no new sinus pain, no new nasal drainage, no new nasal congestion, no ear ringing     RESPIRATORY: see interval history    CV: no chest pain, no palpitations, no peripheral edema    GI: no nausea, no vomiting, no change in stools, no fatty stools, controlled GERD    : negative urinary    MUSCULOSKELETAL: see above    ENDOCRINE: blood sugars with adequate control    NEURO:  No headache    SLEEP: no issues--better not that he does not go to work early    PSYCHIATRIC: mood stable--pretty good, spaz at work          Past Medical and Surgical History:     Past Medical History:   Diagnosis Date    CAP (community acquired pneumonia)     2011    CF (cystic fibrosis) (H)     diagnosed 1969, malnutrition, staph empyema    Chronic knee pain     Diverticular disease 2018    see on colonoscopy    GERD (gastroesophageal reflux disease)     egd in the past    Pancreatic  insufficiency      Past Surgical History:   Procedure Laterality Date    ENDOSCOPIC SINUS SURGERY      removal of mucocele behind eye, 1992    OPTICAL TRACKING SYSTEM ENDOSCOPIC SINUS SURGERY Bilateral 11/28/2016    Procedure: OPTICAL TRACKING SYSTEM ENDOSCOPIC SINUS SURGERY;  Surgeon: Harriett Cosby MD;  Location:  OR           Family History:     Family History   Problem Relation Age of Onset    Lipids Father     Cardiovascular Father     Diabetes Maternal Grandmother         type 2            Social History:     Social History     Socioeconomic History    Marital status:      Spouse name: Not on file    Number of children: 1    Years of education: Not on file    Highest education level: Not on file   Occupational History    Occupation:    Tobacco Use    Smoking status: Never    Smokeless tobacco: Never   Substance and Sexual Activity    Alcohol use: No     Alcohol/week: 0.0 standard drinks of alcohol    Drug use: No    Sexual activity: Yes     Partners: Female     Birth control/protection: None   Other Topics Concern    Parent/sibling w/ CABG, MI or angioplasty before 65F 55M? Not Asked   Social History Narrative    8/18/2020 --Lives in [place] with his wife and 15 yo son (Raghu).  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     Social Determinants of Health     Financial Resource Strain: Not on file   Food Insecurity: Not on file   Transportation Needs: Not on file   Physical Activity: Not on file   Stress: Not on file   Social Connections: Not on file   Interpersonal Safety: Not on file   Housing Stability: Not on file            Medications:     Current Outpatient Medications   Medication Sig Dispense Refill    azithromycin (ZITHROMAX) 500 MG tablet TAKE 1 TABLET BY MOUTH EVERY  MONDAY, WEDNESDAY, AND FRIDAY  MORNING 39 tablet 3    elexacaftor-tezacaftor-ivacaftor & ivacaftor (TRIKAFTA) 100-50-75 & 150 MG tablet pack Take 2 orange  "tablets in the morning and 1 light blue tablet in the evening. Swallow whole with fat-containing food. 84 tablet 5    hydrochlorothiazide (MICROZIDE) 12.5 MG capsule Take 12.5 mg by mouth daily      lipase-protease-amylase (ZENPEP) 37331-38333-50958 units CPEP TAKE 8 CAPSULE BY MOUTH 3 TIMES  DAILY WITH MEALS AND 3 CAPSULES  BY MOUTH WITH SNACKS. ALLOW FOR  3 MEALS 3 SNACKS PER DAY 2900 capsule 3    multivitamin CF formula (AQUADEKS) chewable tablet Take 2 tablets by mouth daily 60 tablet 3    pantoprazole (PROTONIX) 40 MG EC tablet TAKE 1 TABLET BY MOUTH TWICE  DAILY 180 tablet 3    albuterol (PROAIR HFA/PROVENTIL HFA/VENTOLIN HFA) 108 (90 Base) MCG/ACT inhaler USE 2 INHALATIONS ORALLY   EVERY 4 HOURS AS NEEDED FORSHORTNESS OF BREATH/DYSPNEAOR WHEEZING 54 g 3    Continuous Blood Gluc  (DEXCOM G7 ) SAVI Use to read blood sugars as per 's instructions. 1 each 0    Continuous Blood Gluc Sensor (DEXCOM G7 SENSOR) MISC Change every 10 days. 3 each 5    Norah Altera Nebulizer System MISC 1 Units 3 times daily 28 days on, 28 days off. (Patient not taking: Reported on 4/9/2024) 1 each 1    Respiratory Therapy Supplies (NORAH ALTERA NEBULIZER HANDSET) MISC 1 Device 3 times daily 28 days on, 28 days off. (Patient not taking: Reported on 4/9/2024) 1 each 5    sodium chloride inhalant 7 % NEBU neb solution TAKE 4 MLS BY NEBULIZATION DAILY 720 mL 3     No current facility-administered medications for this visit.            Physical Exam:   /84   Pulse 71   Ht 1.75 m (5' 8.9\")   Wt 69.4 kg (153 lb)   SpO2 96%   BMI 22.66 kg/m      Constitutional:   Awake, alert and in no apparent distress     Eyes:   nonicteric     ENT:   oral mucosa moist without lesions, normal tm bilaterally, bilateral mucosa normal     Neck:   Supple without supraclavicular or cervical lymphadenopathy     Lungs:   Good air flow.  No crackles. No rhonchi.  No wheezes.     Cardiovascular:   Normal S1 and S2.  RRR.  No " murmur, gallop or rub.     Abdomen:   NABS, soft, nontender, nondistended.      Musculoskeletal:   No edema, digital clubbing present     Neurologic:   Alert and conversant.     Skin:   Warm, dry.  No rash on limited exam.             Data:   All laboratory and imaging data reviewed.      Results from the last week:  Recent Results (from the past 168 hour(s))   Hepatic function panel    Collection Time: 04/03/24  5:14 PM   Result Value Ref Range    Protein Total (External) 7.0 6.4 - 8.3 g/dL    Albumin (External) 4.6 3.7 - 5.2 g/dL    AST (External) 38 17 - 59 U/L    Alk Phosphatase (External) 80 53 - 128 U/L    Bilirubin Total (External) 1.2 0.0 - 1.5 mg/dL    Bilirubin Direct (External) 0.2 0.0 - 0.3 mg/dL    ALT (External) 44 <50 U/L   CK total    Collection Time: 04/03/24  5:14 PM   Result Value Ref Range    CK (External) 126 0 - 170 U/L   General PFT Lab (Please always keep checked)    Collection Time: 04/09/24  6:53 AM   Result Value Ref Range    FVC-Pred 4.20 L    FVC-Pre 4.12 L    FVC-%Pred-Pre 98 %    FEV1-Pre 2.69 L    FEV1-%Pred-Pre 81 %    FEV1FVC-Pred 79 %    FEV1FVC-Pre 65 %    FEFMax-Pred 9.15 L/sec    FEFMax-Pre 8.82 L/sec    FEFMax-%Pred-Pre 96 %    FEF2575-Pred 2.90 L/sec    FEF2575-Pre 1.34 L/sec    QGM4141-%Pred-Pre 46 %    ExpTime-Pre 9.37 sec    FIFMax-Pre 8.08 L/sec    FEV1FEV6-Pred 79 %    FEV1FEV6-Pre 69 %       PFT interpretation:   Spirometry interpretation:  The spirometry is normal.  When compared to 7/25/2023, the FEV1 and FVC have little change.  The testing meets ATS criteria.    Severity Z-score*   Normal > -1.645   Mild -1.65 to -2.5   Moderate -2.5 to -4   Severe < -4   * accounts for sex, age, height, ancestry     Use z-score to assess airflow obstruction, restriction and DLCO  - FEV1 z-score for airflow obstruction  - TLC z-score for restriction  - DLCO z-score for diffusion defect    Pulmonary exacerbation: absent    55 minutes spent by me on the date of the encounter doing  chart review, history and exam, documentation and further activities per the note  Time documented is excluding time spent for PFT interpretation.

## 2024-04-09 ENCOUNTER — OFFICE VISIT (OUTPATIENT)
Dept: PHARMACY | Facility: CLINIC | Age: 59
End: 2024-04-09
Payer: COMMERCIAL

## 2024-04-09 ENCOUNTER — OFFICE VISIT (OUTPATIENT)
Dept: PULMONOLOGY | Facility: CLINIC | Age: 59
End: 2024-04-09
Attending: INTERNAL MEDICINE
Payer: COMMERCIAL

## 2024-04-09 VITALS
BODY MASS INDEX: 22.66 KG/M2 | WEIGHT: 153 LBS | HEART RATE: 71 BPM | SYSTOLIC BLOOD PRESSURE: 132 MMHG | DIASTOLIC BLOOD PRESSURE: 84 MMHG | HEIGHT: 69 IN | OXYGEN SATURATION: 96 %

## 2024-04-09 DIAGNOSIS — E84.9 CYSTIC FIBROSIS (H): Primary | ICD-10-CM

## 2024-04-09 DIAGNOSIS — E84.9 CF (CYSTIC FIBROSIS) (H): Primary | ICD-10-CM

## 2024-04-09 DIAGNOSIS — K86.89 PANCREATIC INSUFFICIENCY: ICD-10-CM

## 2024-04-09 DIAGNOSIS — E84.9 CYSTIC FIBROSIS (H): ICD-10-CM

## 2024-04-09 DIAGNOSIS — R73.02 IMPAIRED GLUCOSE TOLERANCE: ICD-10-CM

## 2024-04-09 DIAGNOSIS — E84.0 CYSTIC FIBROSIS WITH PULMONARY MANIFESTATIONS (H): Primary | ICD-10-CM

## 2024-04-09 LAB
EXPTIME-PRE: 9.37 SEC
FEF2575-%PRED-PRE: 46 %
FEF2575-PRE: 1.34 L/SEC
FEF2575-PRED: 2.9 L/SEC
FEFMAX-%PRED-PRE: 96 %
FEFMAX-PRE: 8.82 L/SEC
FEFMAX-PRED: 9.15 L/SEC
FEV1-%PRED-PRE: 81 %
FEV1-PRE: 2.69 L
FEV1FEV6-PRE: 69 %
FEV1FEV6-PRED: 79 %
FEV1FVC-PRE: 65 %
FEV1FVC-PRED: 79 %
FIFMAX-PRE: 8.08 L/SEC
FVC-%PRED-PRE: 98 %
FVC-PRE: 4.12 L
FVC-PRED: 4.2 L

## 2024-04-09 PROCEDURE — 99215 OFFICE O/P EST HI 40 MIN: CPT | Mod: 25 | Performed by: INTERNAL MEDICINE

## 2024-04-09 PROCEDURE — 87186 SC STD MICRODIL/AGAR DIL: CPT | Performed by: INTERNAL MEDICINE

## 2024-04-09 PROCEDURE — 94375 RESPIRATORY FLOW VOLUME LOOP: CPT | Performed by: INTERNAL MEDICINE

## 2024-04-09 PROCEDURE — 99207 PR NO CHARGE LOS: CPT | Performed by: PHARMACIST

## 2024-04-09 PROCEDURE — 99213 OFFICE O/P EST LOW 20 MIN: CPT | Performed by: INTERNAL MEDICINE

## 2024-04-09 ASSESSMENT — PAIN SCALES - GENERAL: PAINLEVEL: NO PAIN (0)

## 2024-04-09 NOTE — PROGRESS NOTES
Nutrition Note    Michele is due for annual nutrition assessment per CF clinic protocol.  Pt unable to stay for visit today, will attempt again at next follow-up visits.     Alondra Vitale MS, RD, LD, CACFD (pager [phone]/[employer])  Cystic Fibrosis/CF Lung Transplant Dietitian      -Available Mon-Thurs 8 AM-4:30 PM. On Fridays please contact Willa Romero RD and on weekends/holidays contact coverage dietitian via [employer] (inpatient use only).

## 2024-04-09 NOTE — LETTER
4/9/2024         RE: Michele Altamirano  [address] [address]        Dear Colleague,    Thank you for referring your patient, Michele Altamirano, to the [facility] [place]. Please see a copy of my visit note below.    [facility]  April 9, 2024         Assessment and Plan:   Michele Altamirano is a 58 year old male with cystic fibrosis.      Cystic fibrosis with normal spirometry:  Michele reports from a pulmonary point of view that he is doing well.  He has little in the way of pulmonary symptomatology.  He is able to exercise without limitation related to his lungs.  He historically has grown out Pseudomonas in his sputum.  His pulmonary function test today do show a slight decrease in FEV but an increase in FVC.  Michele does not participate in bronchial drainage or nebulized therapies at this time given his lack of symptomatology.  At this time I recommended to Michele:  -- He should certainly continue to do his vest and nebulized therapy if he were to have any worsening symptomatology  -- He is no longer consistently doing inhalational hypertonic saline  -- He should continue to remain active is his form of bronchial drainage therapy walking 2 miles per day     2. Pancreatic Insufficiency/GI: Michele has no new symptoms consistent with worsening malabsorption.  He historically has had some difficulty with gastroesophageal reflux, so has remained on a PPI and H2 blocker.  He is now off the H2 blocker and describes good control of his symptoms. Michele has longstanding abdominal fullness after eating.  This is unchanged.  - continue the present dose of pancreatic enzymes  - continue vitamin supplementation.     3.  CFTR modulator therapy: Michele is on Trikafta and tolerating well.  He has hepatic panel and CK completed 4/3/2024.     4.  Abnormal glucose tolerance: Michele has a history of abnormal glucose tolerance.  He uses a  continuous glucose monitor.  He describes blood sugars averaging in the 130-135s.  He is managing his blood sugars with his diet.  I would encourage him to follow up with endocrine     5.  Osteoarthritis: Michele reports that he due to receive steroid injections to some of his hand joints.  He still is struggling with some back discomfort from degenerative disc disease.       6.  Psychosocial: Michele continues to work full-time.  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  His son is doing well at [employer].  He discuss possibly going part time in the future.      7. HCM:  Michele will schedule colonoscopy at his local clinic.  He will obtain a DEXA at his next visit.    Immunizations: UTD  Colonoscopy: due now (2018)  Last DEXA: 2018  Last hepatic panel: 4/2024    Annual studies due: review  Recent Labs   Lab Test 05/11/22  1107        Siobhan Munoz MD MPH  Associate Professor of Medicine  Pulmonary, Allergy, Critical Care and Sleep Medicine        Interval History:     Michele denies cough, sputum or chest congestion.  He does not have shortness of breath.  He does not do vest therapy.         Review of Systems:     CONSTITUTIONAL: no fever, no chills, no sweats, no change in weight, no change in energy, no change in appetite    INTEGUMENTARY/SKIN: no rash, no obvious new lesions    ENT/MOUTH: no sore throat, no new sinus pain, no new nasal drainage, no new nasal congestion, no ear ringing     RESPIRATORY: see interval history    CV: no chest pain, no palpitations, no peripheral edema    GI: no nausea, no vomiting, no change in stools, no fatty stools, controlled GERD    : negative urinary    MUSCULOSKELETAL: see above    ENDOCRINE: blood sugars with adequate control    NEURO:  No headache    SLEEP: no issues--better not that he does not go to work early    PSYCHIATRIC: mood stable--pretty good, spaz at work          Past Medical and Surgical History:     Past Medical History:   Diagnosis Date     CAP (community acquired pneumonia)     2011    CF (cystic fibrosis) (H)     diagnosed 1969, malnutrition, staph empyema    Chronic knee pain     Diverticular disease 2018    see on colonoscopy    GERD (gastroesophageal reflux disease)     egd in the past    Pancreatic insufficiency      Past Surgical History:   Procedure Laterality Date    ENDOSCOPIC SINUS SURGERY      removal of mucocele behind eye, 1992    OPTICAL TRACKING SYSTEM ENDOSCOPIC SINUS SURGERY Bilateral 11/28/2016    Procedure: OPTICAL TRACKING SYSTEM ENDOSCOPIC SINUS SURGERY;  Surgeon: Harriett Cosby MD;  Location:  OR           Family History:     Family History   Problem Relation Age of Onset    Lipids Father     Cardiovascular Father     Diabetes Maternal Grandmother         type 2            Social History:     Social History     Socioeconomic History    Marital status:      Spouse name: Not on file    Number of children: 1    Years of education: Not on file    Highest education level: Not on file   Occupational History    Occupation:    Tobacco Use    Smoking status: Never    Smokeless tobacco: Never   Substance and Sexual Activity    Alcohol use: No     Alcohol/week: 0.0 standard drinks of alcohol    Drug use: No    Sexual activity: Yes     Partners: Female     Birth control/protection: None   Other Topics Concern    Parent/sibling w/ CABG, MI or angioplasty before 65F 55M? Not Asked   Social History Narrative    8/18/2020 --Lives in [place] with his wife and 15 yo son (Raghu).  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     Social Determinants of Health     Financial Resource Strain: Not on file   Food Insecurity: Not on file   Transportation Needs: Not on file   Physical Activity: Not on file   Stress: Not on file   Social Connections: Not on file   Interpersonal Safety: Not on file   Housing Stability: Not on file            Medications:     Current Outpatient  "Medications   Medication Sig Dispense Refill    azithromycin (ZITHROMAX) 500 MG tablet TAKE 1 TABLET BY MOUTH EVERY  MONDAY, WEDNESDAY, AND FRIDAY  MORNING 39 tablet 3    elexacaftor-tezacaftor-ivacaftor & ivacaftor (TRIKAFTA) 100-50-75 & 150 MG tablet pack Take 2 orange tablets in the morning and 1 light blue tablet in the evening. Swallow whole with fat-containing food. 84 tablet 5    hydrochlorothiazide (MICROZIDE) 12.5 MG capsule Take 12.5 mg by mouth daily      lipase-protease-amylase (ZENPEP) 64241-06252-43712 units CPEP TAKE 8 CAPSULE BY MOUTH 3 TIMES  DAILY WITH MEALS AND 3 CAPSULES  BY MOUTH WITH SNACKS. ALLOW FOR  3 MEALS 3 SNACKS PER DAY 2900 capsule 3    multivitamin CF formula (AQUADEKS) chewable tablet Take 2 tablets by mouth daily 60 tablet 3    pantoprazole (PROTONIX) 40 MG EC tablet TAKE 1 TABLET BY MOUTH TWICE  DAILY 180 tablet 3    albuterol (PROAIR HFA/PROVENTIL HFA/VENTOLIN HFA) 108 (90 Base) MCG/ACT inhaler USE 2 INHALATIONS ORALLY   EVERY 4 HOURS AS NEEDED FORSHORTNESS OF BREATH/DYSPNEAOR WHEEZING 54 g 3    Continuous Blood Gluc  (DEXCOM G7 ) SAVI Use to read blood sugars as per 's instructions. 1 each 0    Continuous Blood Gluc Sensor (DEXCOM G7 SENSOR) MISC Change every 10 days. 3 each 5    Norah Altera Nebulizer System MISC 1 Units 3 times daily 28 days on, 28 days off. (Patient not taking: Reported on 4/9/2024) 1 each 1    Respiratory Therapy Supplies (NORAH ALTERA NEBULIZER HANDSET) MISC 1 Device 3 times daily 28 days on, 28 days off. (Patient not taking: Reported on 4/9/2024) 1 each 5    sodium chloride inhalant 7 % NEBU neb solution TAKE 4 MLS BY NEBULIZATION DAILY 720 mL 3     No current facility-administered medications for this visit.            Physical Exam:   /84   Pulse 71   Ht 1.75 m (5' 8.9\")   Wt 69.4 kg (153 lb)   SpO2 96%   BMI 22.66 kg/m      Constitutional:   Awake, alert and in no apparent distress     Eyes:   nonicteric     ENT:   " oral mucosa moist without lesions, normal tm bilaterally, bilateral mucosa normal     Neck:   Supple without supraclavicular or cervical lymphadenopathy     Lungs:   Good air flow.  No crackles. No rhonchi.  No wheezes.     Cardiovascular:   Normal S1 and S2.  RRR.  No murmur, gallop or rub.     Abdomen:   NABS, soft, nontender, nondistended.      Musculoskeletal:   No edema, digital clubbing present     Neurologic:   Alert and conversant.     Skin:   Warm, dry.  No rash on limited exam.             Data:   All laboratory and imaging data reviewed.      Results from the last week:  Recent Results (from the past 168 hour(s))   Hepatic function panel    Collection Time: 04/03/24  5:14 PM   Result Value Ref Range    Protein Total (External) 7.0 6.4 - 8.3 g/dL    Albumin (External) 4.6 3.7 - 5.2 g/dL    AST (External) 38 17 - 59 U/L    Alk Phosphatase (External) 80 53 - 128 U/L    Bilirubin Total (External) 1.2 0.0 - 1.5 mg/dL    Bilirubin Direct (External) 0.2 0.0 - 0.3 mg/dL    ALT (External) 44 <50 U/L   CK total    Collection Time: 04/03/24  5:14 PM   Result Value Ref Range    CK (External) 126 0 - 170 U/L   General PFT Lab (Please always keep checked)    Collection Time: 04/09/24  6:53 AM   Result Value Ref Range    FVC-Pred 4.20 L    FVC-Pre 4.12 L    FVC-%Pred-Pre 98 %    FEV1-Pre 2.69 L    FEV1-%Pred-Pre 81 %    FEV1FVC-Pred 79 %    FEV1FVC-Pre 65 %    FEFMax-Pred 9.15 L/sec    FEFMax-Pre 8.82 L/sec    FEFMax-%Pred-Pre 96 %    FEF2575-Pred 2.90 L/sec    FEF2575-Pre 1.34 L/sec    FSH4940-%Pred-Pre 46 %    ExpTime-Pre 9.37 sec    FIFMax-Pre 8.08 L/sec    FEV1FEV6-Pred 79 %    FEV1FEV6-Pre 69 %       PFT interpretation:   Spirometry interpretation:  The spirometry is normal.  When compared to 7/25/2023, the FEV1 and FVC have little change.  The testing meets ATS criteria.    Severity Z-score*   Normal > -1.645   Mild -1.65 to -2.5   Moderate -2.5 to -4   Severe < -4   * accounts for sex, age, height, ancestry      Use z-score to assess airflow obstruction, restriction and DLCO  - FEV1 z-score for airflow obstruction  - TLC z-score for restriction  - DLCO z-score for diffusion defect    Pulmonary exacerbation: absent    55 minutes spent by me on the date of the encounter doing chart review, history and exam, documentation and further activities per the note  Time documented is excluding time spent for PFT interpretation.    Nutrition Note    Michele is due for annual nutrition assessment per CF clinic protocol.  Pt unable to stay for visit today, will attempt again at next follow-up visits.     Alondra Vitale MS, RD, LD, CACFD (pager [phone]/[email or web address])  Cystic Fibrosis/CF Lung Transplant Dietitian      -Available Mon-Thurs 8 AM-4:30 PM. On Fridays please contact Willa Romero RD and on weekends/holidays contact coverage dietitian via [employer] (inpatient use only).       Again, thank you for allowing me to participate in the care of your patient.        Sincerely,        Siobhan Munoz MD

## 2024-04-09 NOTE — PROGRESS NOTES
Disease State Management Encounter:                          Michele Altamirano is a 58 year old male coming in for a follow-up visit.      Reason for visit: Annual CF medication review.    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 Munoz to remain off inhaled antibiotic due to improvement in lung function.      Pancreatic Insufficiency/Nutrition:   Zenpep 20,000 taking 8 capsules with meals and 3 capsules with snacks  Acid reducer: pantoprazole 40 mg twice daily    Bowel regimen: none  Vitamins/Supplements: multivitamin CF formula 2 daily -contains 5000 international unit(s) vitamin d  Patient is not experiencing sign/symptoms of malabsorption.    CFRD:    SMBG: DexcomG7    Patient is not experiencing hypoglycemia  Recent symptoms of high blood sugar? none  Most recent HgA1C:   Lab Results   Component Value Date    A1C 5.9 05/11/2022    A1C 6.1 11/03/2020    A1C 5.9 07/14/2020   Patient follows with Dr. Obrien for endocrinology.    Today's Vitals: There were no vitals taken for this visit.    Assessment/Plan:    Per discussion, ok to refill Trikafta x 6 months (not 1 year). Continue Trikafta and recheck hepatic panel and CK in 6 months.     Follow-up: 3 months clinic visit    I spent 15 minutes with this patient today. All changes were made via verbal approval with Dr. Siobhan Munoz MD. A copy of the visit note was provided to the patient's provider(s).    A summary of these recommendations was given to the patient (see AVS from today's appointment with   Siobhan Munoz MD).    Nydia Rodriguez, PharmD  Medication Therapy Management Pharmacist       Medication Therapy Recommendations  No medication therapy recommendations to display

## 2024-04-09 NOTE — LETTER
PHYSICIAN ORDERS      DATE & TIME ISSUED: April 10, 2024 12:40 PM  PATIENT NAME: Michele Altamirano   : 1965     Formerly [facility] MR# [if applicable]  DIAGNOSIS:  Cystic Fibrosis E84.0    Please draw/collect the following labs in three months:      Basic Metabolic   Lipid Panel  Hepatic Panel  CK total  Iron  GGT  Hemoglobin A1C  Immunoglobulin  IgG, IgE  INR  Magnesium  Phosphorus  Testosterone-males only  TSH reflex to FT4 and T3  Vitamin A  Vitamin E  Vitamin D deficiency    CBC with diff and platelet  Erythrocyte sedimentation rate   Routine UA with microscopic   Albumin, Random Urine Quantitative with creat ratio       Any questions please call: [phone]    Please fax these results to [phone].

## 2024-04-09 NOTE — PATIENT INSTRUCTIONS
See provider AVS for a summary of recommendations from today's visit.  Nydia Rodriguez, PharmD  Cystic Fibrosis MTM Pharmacist  [facility]

## 2024-04-10 ENCOUNTER — TELEPHONE (OUTPATIENT)
Dept: PULMONOLOGY | Facility: CLINIC | Age: 59
End: 2024-04-10
Payer: COMMERCIAL

## 2024-04-10 NOTE — TELEPHONE ENCOUNTER
Left Voicemail (1st Attempt) for the patient to call back and schedule the following:    Appointment type: F  Provider: FLO  Return date: 07/09/2024  Specialty phone number: [phone]  Additional appointment(s) needed: CF LOOP  Additonal Notes: N/A

## 2024-04-14 LAB
BACTERIA SPEC CULT: ABNORMAL
BACTERIA SPEC CULT: ABNORMAL

## 2024-04-15 NOTE — TELEPHONE ENCOUNTER
Left Voicemail (2nd Attempt) for the patient to call back and schedule the following:    Appointment type: F  Provider: FLO  Return date: 07/09/2024  Specialty phone number: [phone]  Additional appointment(s) needed: CF LOOP, LABS, DEXA  Additonal Notes: N/A

## 2024-04-17 ENCOUNTER — TELEPHONE (OUTPATIENT)
Dept: ENDOCRINOLOGY | Facility: CLINIC | Age: 59
End: 2024-04-17
Payer: COMMERCIAL

## 2024-04-17 NOTE — TELEPHONE ENCOUNTER
PA Initiation    Medication: DEXCOM G6 SENSOR MISC  Insurance Company: Other (see comments)Comment:  [facility]  Pharmacy Filling the Rx: [facility] - [place], [place] - [address] [place] [address]  Filling Pharmacy Phone:    Filling Pharmacy Fax:    Start Date: 4/17/2024    [email or web address] ID # [id number]

## 2024-04-18 NOTE — TELEPHONE ENCOUNTER
Prior Authorization Approval    Medication: DEXCOM G6 SENSOR MISC  Authorization Effective Date: 4/18/2024  Authorization Expiration Date: 4/17/2025  Approved Dose/Quantity: 3 per 30 days  Reference #: [email or web address]   Insurance Company: Other (see comments)Comment:  [facility]  Expected CoPay: $    CoPay Card Available:      Financial Assistance Needed: no  Which Pharmacy is filling the prescription: [facility] - [address]  Pharmacy Notified: yes  Patient Notified: no

## 2024-05-01 ENCOUNTER — MYC MEDICAL ADVICE (OUTPATIENT)
Dept: ENDOCRINOLOGY | Facility: CLINIC | Age: 59
End: 2024-05-01
Payer: COMMERCIAL

## 2024-05-01 DIAGNOSIS — E10.9 TYPE 1 DIABETES MELLITUS (H): ICD-10-CM

## 2024-05-02 RX ORDER — ACYCLOVIR 400 MG/1
TABLET ORAL
Qty: 10 EACH | Refills: 0 | Status: SHIPPED | OUTPATIENT
Start: 2024-05-02 | End: 2024-07-09

## 2024-06-18 DIAGNOSIS — E84.9 CYSTIC FIBROSIS (H): ICD-10-CM

## 2024-06-19 RX ORDER — PANCRELIPASE LIPASE, PANCRELIPASE PROTEASE, PANCRELIPASE AMYLASE 20000; 63000; 84000 [USP'U]/1; [USP'U]/1; [USP'U]/1
CAPSULE, DELAYED RELEASE ORAL
Qty: 3000 CAPSULE | Refills: 3 | Status: SHIPPED | OUTPATIENT
Start: 2024-06-19

## 2024-06-27 ENCOUNTER — TELEPHONE (OUTPATIENT)
Dept: PULMONOLOGY | Facility: CLINIC | Age: 59
End: 2024-06-27
Payer: COMMERCIAL

## 2024-06-27 NOTE — TELEPHONE ENCOUNTER
Returned call to [facility], spoke with pharmacist regarding fax received for order #[id number].    Pharmacist stated the prescription has been filled, they do not need any further clarification.    Julissa Sandoval, BSN, RN  RN Care Coordinator [facility]

## 2024-06-29 ENCOUNTER — HEALTH MAINTENANCE LETTER (OUTPATIENT)
Age: 59
End: 2024-06-29

## 2024-07-06 DIAGNOSIS — E10.9 TYPE 1 DIABETES MELLITUS (H): ICD-10-CM

## 2024-07-09 RX ORDER — ACYCLOVIR 400 MG/1
TABLET ORAL
Qty: 10 EACH | Refills: 0 | Status: SHIPPED | OUTPATIENT
Start: 2024-07-09

## 2024-07-09 NOTE — TELEPHONE ENCOUNTER
LVD:  12/13/2022  [facility] Caity Gutierrez MD  Endocrinology, Diabetes, and Metabolism     Refilled per protocol.

## 2024-07-25 ENCOUNTER — TELEPHONE (OUTPATIENT)
Dept: PULMONOLOGY | Facility: CLINIC | Age: 59
End: 2024-07-25
Payer: COMMERCIAL

## 2024-07-25 NOTE — TELEPHONE ENCOUNTER
Prior Authorization Retail Medication Request    Medication/Dose:   Diagnosis and ICD code (if different than what is on RX):  N/A  New/renewal/insurance change PA/secondary ins. PA:  Previously Tried and Failed:  pantoprazole once daily, famotidine, ranitidine  Rationale:  needs pantoprazole twice daily therapy for management of reflux symptoms secondary to cystic fibrosis    Insurance   Primary: [employer] [place] of [place]  Insurance ID:  [id number]      Secondary (if applicable):N/A    Pharmacy Information (if different than what is on RX)  Name:  N/A

## 2024-07-30 NOTE — TELEPHONE ENCOUNTER
Response from CM states this cannot be done through that website and must be completed via Prompt[employer].    Reinitiated

## 2024-07-30 NOTE — TELEPHONE ENCOUNTER
Prior Authorization Approval    Medication: PANTOPRAZOLE SODIUM 40 MG PO [facility]  Authorization Effective Date: 7/30/2024  Authorization Expiration Date: 7/30/2025  Approved Dose/Quantity:   Reference #:     Insurance Company: Other (see comments)  Expected CoPay: $    CoPay Card Available:      Financial Assistance Needed:   Which Pharmacy is filling the prescription: [facility] - [place], [place] - [address]  Pharmacy Notified: Yes  Patient Notified:

## 2024-07-31 NOTE — TELEPHONE ENCOUNTER
Art sent to patient to update.    Liset Cee, PharmD  Cystic Fibrosis MTM Pharmacist  [facility]  Voicemail: [phone]

## 2024-08-30 NOTE — PROGRESS NOTES
Outcome for 08/30/24 12:51 PM: Mychart message sent  Maria Elena Parson MA  Outcome for 09/09/24 9:42 AM: Left Voicemail   Maria Elena Parson MA  Outcome for 09/09/24 10:23 AM: Data obtained via Dexcom website  Shyla Esquivel LPN

## 2024-09-09 ENCOUNTER — TELEPHONE (OUTPATIENT)
Dept: ENDOCRINOLOGY | Facility: CLINIC | Age: 59
End: 2024-09-09
Payer: COMMERCIAL

## 2024-09-09 NOTE — TELEPHONE ENCOUNTER
Called patient and left voicemail. Patient has appointment on  9/10/24 . Need to collect 14 days of blood sugar readings if patient is using meter, or if patient is using CGM, need to get patients device uploaded to retrieve report for provider to review.  Maria Elena Parson MA

## 2024-09-10 ENCOUNTER — VIRTUAL VISIT (OUTPATIENT)
Dept: ENDOCRINOLOGY | Facility: CLINIC | Age: 59
End: 2024-09-10
Attending: INTERNAL MEDICINE
Payer: COMMERCIAL

## 2024-09-10 DIAGNOSIS — E08.9 DIABETES MELLITUS RELATED TO CYSTIC FIBROSIS (H): Primary | ICD-10-CM

## 2024-09-10 DIAGNOSIS — E84.8 DIABETES MELLITUS RELATED TO CYSTIC FIBROSIS (H): Primary | ICD-10-CM

## 2024-09-10 PROCEDURE — 99214 OFFICE O/P EST MOD 30 MIN: CPT | Mod: 95 | Performed by: INTERNAL MEDICINE

## 2024-09-10 NOTE — PROGRESS NOTES
Michele Altamirano  is being evaluated via a billable video visit.      CF Endocrinology Consultation:  Diabetes  :   Patient: Michele Altamirano MRN# [id number]   YOB: 1965 Age: 57 year old   Date of Visit: 09/10/2024     Dear Dr. Siobhan Munoz:    I had the pleasure of seeing your patient, Michele Altamirano in the [facility] Endocrinology Clinic, [facility]  for a consultation regarding impaired glucose tolerance.           Problem list:     Patient Active Problem List    Diagnosis Date Noted    Gastroesophageal reflux disease 01/06/2021     Priority: Medium    Impaired glucose tolerance 01/06/2021     Priority: Medium    Knee pain 01/06/2021     Priority: Medium    Recurrent sinusitis 01/06/2021     Priority: Medium    Restless legs syndrome 01/06/2021     Priority: Medium    Blanca's neuroma, left 11/16/2017     Priority: Medium    Abnormal glucose tolerance test 11/20/2015     Priority: Medium    Vitamin D deficiency 09/24/2015     Priority: Medium    Screening for diabetes mellitus (DM) 09/24/2015     Priority: Medium    Screening for hyperlipidemia 09/24/2015     Priority: Medium    Lipid screening 09/24/2015     Priority: Medium    Pancreatic insufficiency 08/18/2015     Priority: Medium    Cystic fibrosis with pulmonary manifestations (H) 02/24/2012     Priority: Medium     SWEAT TEST:  Date: 12/3/69            Laboratory:   Sample #1:      Unknown mg           120mmol/L Cl      GENOTYPING:  Date: 10/02/12       Laboratory: [facility] through [facility] Mutation Analysis Program  Genotype: Delta F508/Delta F508  Poly T Variant:                 HPI:   Michele is a 57 year old male with impaired glucose tolerance.    I have reviewed the available past laboratory evaluations, imaging studies, and medical records available to me at this visit.    History was obtained from the patient and the medical record.  I have reviewed the notes of the pulmonary care team entered into the medical record     Patient  has history of impaired glucose tolerance.  He has had an A1c in the past which was 6.6% in the diabetes range.  Most recent glucose tolerance test was done in the setting of a research study  7/16/2021 2-hour glucose on OGTT was 193  June 22, 2023 OGTT done during research study 2-hour glucose was 205    He has history of reactive hypoglycemia.  He is on Trikafta and overall feeling well  Has history of pancreatic insufficiency    He is using Dexcom G7 CGM regularly and finds it very helpful  He uses CGM data to regulate carb intake while trying to maintain calorie intake goal    Over the last 2 weeks average sensor glucose 131, GMI 6.4%, time in range 92%, 0% low  Reports occasional postprandial lows that tend to occur if he exercises after breakfast    Outside A1c 11/21/2023 6%  A1c:  Hemoglobin A1C   Date Value Ref Range Status   05/11/2022 5.9 (H) 0.0 - 5.6 % Final     Comment:     Normal <5.7%   Prediabetes 5.7-6.4%    Diabetes 6.5% or higher     Note: Adopted from ADA consensus guidelines.   11/03/2020 6.1 (A) 0 - 5.6 % Final      Result was discussed at today's visit.     Current insulin regimen:   None          Past Medical History:     Past Medical History:   Diagnosis Date    CAP (community acquired pneumonia)     2011    CF (cystic fibrosis) (H)     diagnosed 1969, malnutrition, staph empyema    Chronic knee pain     Diverticular disease 2018    see on colonoscopy    GERD (gastroesophageal reflux disease)     egd in the past    Pancreatic insufficiency             Past Surgical History:     Past Surgical History:   Procedure Laterality Date    ENDOSCOPIC SINUS SURGERY      removal of mucocele behind eye, 1992    OPTICAL TRACKING SYSTEM ENDOSCOPIC SINUS SURGERY Bilateral 11/28/2016    Procedure: OPTICAL TRACKING SYSTEM ENDOSCOPIC SINUS SURGERY;  Surgeon: Harriett Cosby MD;  Location:  OR               Social History:     Social History     Social History Narrative    8/18/2020 --Lives in [place] with  his wife and 15 yo son (Raghu).  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              Family History:     Family History   Problem Relation Age of Onset    Lipids Father     Cardiovascular Father     Diabetes Maternal Grandmother         type 2            Allergies:   No Known Allergies          Medications:     Current Outpatient Rx   Medication Sig Dispense Refill    azithromycin (ZITHROMAX) 500 MG tablet TAKE 1 TABLET BY MOUTH EVERY  MONDAY, WEDNESDAY, AND FRIDAY  MORNING 39 tablet 3    Continuous Blood Gluc  (DEXCOM G7 ) SAVI Use to read blood sugars as per 's instructions. 1 each 0    Continuous Glucose Sensor (DEXCOM G7 SENSOR) MISC CHANGE SENSOR EVERY 10 DAYS.*PLEASE SCHEDULE APPT. FOR REFILLS 10 each 0    elexacaftor-tezacaftor-ivacaftor & ivacaftor (TRIKAFTA) 100-50-75 & 150 MG tablet pack Take 2 orange tablets in the morning and 1 light blue tablet in the evening. Swallow whole with fat-containing food. 84 tablet 5    hydrochlorothiazide (MICROZIDE) 12.5 MG capsule Take 12.5 mg by mouth daily      multivitamin CF formula (AQUADEKS) chewable tablet Take 2 tablets by mouth daily 60 tablet 3    pantoprazole (PROTONIX) 40 MG EC tablet TAKE 1 TABLET BY MOUTH TWICE  DAILY 180 tablet 3    ZENPEP 58991-48681 units CPEP TAKE 8 CAPSULES BY MOUTH 3 TIMES DAILY WITH MEALS AND 3 CAPSULES  BY MOUTH WITH SNACKS. ALLOW FOR  3 MEALS 3 SNACKS PER DAY 3000 capsule 3    albuterol (PROAIR HFA/PROVENTIL HFA/VENTOLIN HFA) 108 (90 Base) MCG/ACT inhaler USE 2 INHALATIONS ORALLY   EVERY 4 HOURS AS NEEDED FORSHORTNESS OF BREATH/DYSPNEAOR WHEEZING 54 g 3    Norah Altera Nebulizer System MISC 1 Units 3 times daily 28 days on, 28 days off. (Patient not taking: Reported on 4/9/2024) 1 each 1    Respiratory Therapy Supplies (NORAH ALTERA NEBULIZER HANDSET) MISC 1 Device 3 times daily 28 days on, 28 days off. (Patient not taking: Reported on  4/9/2024) 1 each 5    sodium chloride inhalant 7 % NEBU neb solution TAKE 4 MLS BY NEBULIZATION DAILY 720 mL 3             Review of Systems:             Physical Exam:   There were no vitals taken for this visit.  Growth %ile SmartLinks can only be used for patients less than 20 years old.  Height: Data Unavailable, Facility age limit for growth %gabriela is 20 years.  Weight: 0 lbs 0 oz, Facility age limit for growth %gabriela is 20 years.  BMI: There is no height or weight on file to calculate BMI., No height and weight on file for this encounter.      GENERAL: Healthy, alert and no distress  EYES: Eyes grossly normal to inspection.  No discharge or erythema, or obvious scleral/conjunctival abnormalities.  RESP: No audible wheeze, cough, or visible cyanosis.  No visible retractions or increased work of breathing.    NEURO: Cranial nerves grossly intact.  Mentation and speech appropriate for age.  PSYCH: Mentation appears normal, affect normal/bright, judgement and insight intact, normal speech and appearance well-groomed.        Laboratory results:     TSH   Date Value Ref Range Status   05/11/2022 1.28 0.40 - 4.00 mU/L Final   11/03/2020 1.66 mcU/mL Final     Testosterone Total   Date Value Ref Range Status   05/11/2022 537 240 - 950 ng/dL Final   11/03/2020 721 ng/dL Final     Cholesterol   Date Value Ref Range Status   05/11/2022 173 <200 mg/dL Final   11/03/2020 222 mg/dL Final     Albumin Urine mg/L   Date Value Ref Range Status   05/11/2022 11 mg/L Final   02/09/2018 5 mg/L Final     Triglycerides   Date Value Ref Range Status   05/11/2022 109 <150 mg/dL Final   11/03/2020 184 mg/dL Final     HDL Cholesterol   Date Value Ref Range Status   11/03/2020 72 mg/dL Final     Direct Measure HDL   Date Value Ref Range Status   05/11/2022 63 >=40 mg/dL Final     LDL Cholesterol Calculated   Date Value Ref Range Status   05/11/2022 88 <=100 mg/dL Final   11/03/2020 113 mg/dL Final     Cholesterol/HDL Ratio   Date Value Ref  "Range Status   09/24/2015 2.3 0.0 - 5.0 Final     Non HDL Cholesterol   Date Value Ref Range Status   05/11/2022 110 <130 mg/dL Final   02/09/2018 96 <130 mg/dL Final     Lab Results   Component Value Date    A1C 5.9 05/11/2022    A1C 6.1 11/03/2020    A1C 5.9 07/14/2020    A1C 6.3 02/09/2018    A1C 6.3 10/18/2016    A1C 6.1 07/19/2016    No results found for: HEMOGLOBINA1        CF  Diabetes Health Maintenance    Date of Diabetes Diagnosis: Impaired glucose tolerance, A1c 6.6% in 2015    Special Notes (if any): Postprandial hypoglycemia    Date Last Eye Exam:      Date Last Dental Appointment:     Dates of Episodes Severe* Hypoglycemia (month/year, cumulative, ongoing, assess each visit):    *Severe=patient unconscious, seizure, unable to help self    Last 25-Vitamin D (every year):     Last DXA, lowest Z-score (every 2 years):        ?Bisphosphonates (yes/no):     Last Urine Microalbumin (every year):      No results found for: \"MICROALBUMIN\"    Last Testosterone:   Testosterone Total   Date Value Ref Range Status   05/11/2022 537 240 - 950 ng/dL Final   11/03/2020 721 ng/dL Final            Assessment and Plan:   Michele is a 58 year old male with CF, pancreatic insufficiency and history of impaired glucose tolerance    Cystic fibrosis related diabetes:  Overall glucose readings are in range based on CGM data without requiring any medications for diabetes  Patient would like to continue using CGM  Continue to observe without starting any diabetes medications    Postprandial hypoglycemia:  Overall he is aware of activities diet that may precipitate postprandial hypoglycemia    Return to clinic in 1 year    Sincerely,    GIANNI Juarez    Video visit done using [employer]  Video visit start time 9:15 AM  Video visit end time 9:48 AM  Patient location: Home  Provider location: On-site    Time: I spent 30 minutes on the date of the encounter preparing to see patient (including chart review and preparation), obtaining and " or reviewing additional medical history, performing an evaluation, documenting clinical information in the electronic health record, independently interpreting results, communicating results to the patient, and/or coordinating care.     Note: Chart documentation done in part with Dragon Voice Recognition software. Although reviewed after completion, some word and grammatical errors may remain.  Please consider this when interpreting information in this chart

## 2024-09-10 NOTE — NURSING NOTE
Current patient location:  [place]     Is the patient currently in the state of [place]? YES    Visit mode:VIDEO    If the visit is dropped, the patient can be reconnected by: VIDEO VISIT: Text to cell phone:   Telephone Information:   Mobile [phone]       Will anyone else be joining the visit? NO  (If patient encounters technical issues they should call [phone] :[phone])    How would you like to obtain your AVS? MyChart    Are changes needed to the allergy or medication list? Yes pt is not taking albuterol inhaler, or the sodium chloride neb solution     Are refills needed on medications prescribed by this physician? YES Dexcom- pt will discuss with provider     Rooming Documentation:  Questionnaire(s) completed      Reason for visit: RECHECK (Return cystic fibrosis )    Savita LAMBERTF

## 2024-09-10 NOTE — LETTER
9/10/2024       RE: Michele Altamirano  [address] [id number]     Dear Colleague,    Thank you for referring your patient, Michele Altamirano, to the [facility] [place] at [facility]. Please see a copy of my visit note below.    Outcome for 08/30/24 12:51 PM: Mychart message sent  Maria Elena Parson MA  Outcome for 09/09/24 9:42 AM: Left Voicemail   Maria Elena Parson MA  Outcome for 09/09/24 10:23 AM: Data obtained via Dexcom website  Shyla Esquivel LPN             Michele Altamirano  is being evaluated via a billable video visit.      CF Endocrinology Consultation:  Diabetes  :   Patient: Michele Altamirano MRN# [id number]   YOB: 1965 Age: 57 year old   Date of Visit: 09/10/2024     Dear Dr. Siobhan Munoz:    I had the pleasure of seeing your patient, Michele Altamirano in the [facility] Endocrinology Clinic, [facility]  for a consultation regarding impaired glucose tolerance.           Problem list:     Patient Active Problem List    Diagnosis Date Noted     Gastroesophageal reflux disease 01/06/2021     Priority: Medium     Impaired glucose tolerance 01/06/2021     Priority: Medium     Knee pain 01/06/2021     Priority: Medium     Recurrent sinusitis 01/06/2021     Priority: Medium     Restless legs syndrome 01/06/2021     Priority: Medium     Blanca's neuroma, left 11/16/2017     Priority: Medium     Abnormal glucose tolerance test 11/20/2015     Priority: Medium     Vitamin D deficiency 09/24/2015     Priority: Medium     Screening for diabetes mellitus (DM) 09/24/2015     Priority: Medium     Screening for hyperlipidemia 09/24/2015     Priority: Medium     Lipid screening 09/24/2015     Priority: Medium     Pancreatic insufficiency 08/18/2015     Priority: Medium     Cystic fibrosis with pulmonary manifestations (H) 02/24/2012     Priority: Medium     SWEAT TEST:  Date: 12/3/69            Laboratory:   Sample #1:      Unknown mg            120mmol/L Cl      GENOTYPING:  Date: 10/02/12       Laboratory: [facility] through [facility] Mutation Analysis Program  Genotype: Delta F508/Delta F508  Poly T Variant:                 HPI:   Michele is a 57 year old male with impaired glucose tolerance.    I have reviewed the available past laboratory evaluations, imaging studies, and medical records available to me at this visit.    History was obtained from the patient and the medical record.  I have reviewed the notes of the pulmonary care team entered into the medical record     Patient has history of impaired glucose tolerance.  He has had an A1c in the past which was 6.6% in the diabetes range.  Most recent glucose tolerance test was done in the setting of a research study  7/16/2021 2-hour glucose on OGTT was 193  June 22, 2023 OGTT done during research study 2-hour glucose was 205    He has history of reactive hypoglycemia.  He is on Trikafta and overall feeling well  Has history of pancreatic insufficiency    He is using Dexcom G7 CGM regularly and finds it very helpful  He uses CGM data to regulate carb intake while trying to maintain calorie intake goal    Over the last 2 weeks average sensor glucose 131, GMI 6.4%, time in range 92%, 0% low  Reports occasional postprandial lows that tend to occur if he exercises after breakfast    Outside A1c 11/21/2023 6%  A1c:  Hemoglobin A1C   Date Value Ref Range Status   05/11/2022 5.9 (H) 0.0 - 5.6 % Final     Comment:     Normal <5.7%   Prediabetes 5.7-6.4%    Diabetes 6.5% or higher     Note: Adopted from ADA consensus guidelines.   11/03/2020 6.1 (A) 0 - 5.6 % Final      Result was discussed at today's visit.     Current insulin regimen:   None          Past Medical History:     Past Medical History:   Diagnosis Date     CAP (community acquired pneumonia)     2011     CF (cystic fibrosis) (H)     diagnosed 1969, malnutrition, staph empyema     Chronic knee pain      Diverticular disease 2018    see on colonoscopy      GERD (gastroesophageal reflux disease)     egd in the past     Pancreatic insufficiency             Past Surgical History:     Past Surgical History:   Procedure Laterality Date     ENDOSCOPIC SINUS SURGERY      removal of mucocele behind eye, 1992     OPTICAL TRACKING SYSTEM ENDOSCOPIC SINUS SURGERY Bilateral 11/28/2016    Procedure: OPTICAL TRACKING SYSTEM ENDOSCOPIC SINUS SURGERY;  Surgeon: Harriett Cosby MD;  Location:  OR               Social History:     Social History     Social History Narrative    8/18/2020 --Lives in [place] with his wife and 15 yo son (Raghu).  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              Family History:     Family History   Problem Relation Age of Onset     Lipids Father      Cardiovascular Father      Diabetes Maternal Grandmother         type 2            Allergies:   No Known Allergies          Medications:     Current Outpatient Rx   Medication Sig Dispense Refill     azithromycin (ZITHROMAX) 500 MG tablet TAKE 1 TABLET BY MOUTH EVERY  MONDAY, WEDNESDAY, AND FRIDAY  MORNING 39 tablet 3     Continuous Blood Gluc  (DEXCOM G7 ) SAVI Use to read blood sugars as per 's instructions. 1 each 0     Continuous Glucose Sensor (DEXCOM G7 SENSOR) MISC CHANGE SENSOR EVERY 10 DAYS.*PLEASE SCHEDULE APPT. FOR REFILLS 10 each 0     elexacaftor-tezacaftor-ivacaftor & ivacaftor (TRIKAFTA) 100-50-75 & 150 MG tablet pack Take 2 orange tablets in the morning and 1 light blue tablet in the evening. Swallow whole with fat-containing food. 84 tablet 5     hydrochlorothiazide (MICROZIDE) 12.5 MG capsule Take 12.5 mg by mouth daily       multivitamin CF formula (AQUADEKS) chewable tablet Take 2 tablets by mouth daily 60 tablet 3     pantoprazole (PROTONIX) 40 MG EC tablet TAKE 1 TABLET BY MOUTH TWICE  DAILY 180 tablet 3     ZENPEP 33467-03056 units CPEP TAKE 8 CAPSULES BY MOUTH 3 TIMES DAILY WITH MEALS  AND 3 CAPSULES  BY MOUTH WITH SNACKS. ALLOW FOR  3 MEALS 3 SNACKS PER DAY 3000 capsule 3     albuterol (PROAIR HFA/PROVENTIL HFA/VENTOLIN HFA) 108 (90 Base) MCG/ACT inhaler USE 2 INHALATIONS ORALLY   EVERY 4 HOURS AS NEEDED FORSHORTNESS OF BREATH/DYSPNEAOR WHEEZING 54 g 3     Norah Altera Nebulizer System MISC 1 Units 3 times daily 28 days on, 28 days off. (Patient not taking: Reported on 4/9/2024) 1 each 1     Respiratory Therapy Supplies (NORAH ALTERA NEBULIZER HANDSET) MISC 1 Device 3 times daily 28 days on, 28 days off. (Patient not taking: Reported on 4/9/2024) 1 each 5     sodium chloride inhalant 7 % NEBU neb solution TAKE 4 MLS BY NEBULIZATION DAILY 720 mL 3             Review of Systems:             Physical Exam:   There were no vitals taken for this visit.  Growth %ile SmartLinks can only be used for patients less than 20 years old.  Height: Data Unavailable, Facility age limit for growth %gabriela is 20 years.  Weight: 0 lbs 0 oz, Facility age limit for growth %gabriela is 20 years.  BMI: There is no height or weight on file to calculate BMI., No height and weight on file for this encounter.      GENERAL: Healthy, alert and no distress  EYES: Eyes grossly normal to inspection.  No discharge or erythema, or obvious scleral/conjunctival abnormalities.  RESP: No audible wheeze, cough, or visible cyanosis.  No visible retractions or increased work of breathing.    NEURO: Cranial nerves grossly intact.  Mentation and speech appropriate for age.  PSYCH: Mentation appears normal, affect normal/bright, judgement and insight intact, normal speech and appearance well-groomed.        Laboratory results:     TSH   Date Value Ref Range Status   05/11/2022 1.28 0.40 - 4.00 mU/L Final   11/03/2020 1.66 mcU/mL Final     Testosterone Total   Date Value Ref Range Status   05/11/2022 537 240 - 950 ng/dL Final   11/03/2020 721 ng/dL Final     Cholesterol   Date Value Ref Range Status   05/11/2022 173 <200 mg/dL Final   11/03/2020 222  "mg/dL Final     Albumin Urine mg/L   Date Value Ref Range Status   05/11/2022 11 mg/L Final   02/09/2018 5 mg/L Final     Triglycerides   Date Value Ref Range Status   05/11/2022 109 <150 mg/dL Final   11/03/2020 184 mg/dL Final     HDL Cholesterol   Date Value Ref Range Status   11/03/2020 72 mg/dL Final     Direct Measure HDL   Date Value Ref Range Status   05/11/2022 63 >=40 mg/dL Final     LDL Cholesterol Calculated   Date Value Ref Range Status   05/11/2022 88 <=100 mg/dL Final   11/03/2020 113 mg/dL Final     Cholesterol/HDL Ratio   Date Value Ref Range Status   09/24/2015 2.3 0.0 - 5.0 Final     Non HDL Cholesterol   Date Value Ref Range Status   05/11/2022 110 <130 mg/dL Final   02/09/2018 96 <130 mg/dL Final     Lab Results   Component Value Date    A1C 5.9 05/11/2022    A1C 6.1 11/03/2020    A1C 5.9 07/14/2020    A1C 6.3 02/09/2018    A1C 6.3 10/18/2016    A1C 6.1 07/19/2016    No results found for: HEMOGLOBINA1        CF  Diabetes Health Maintenance    Date of Diabetes Diagnosis: Impaired glucose tolerance, A1c 6.6% in 2015    Special Notes (if any): Postprandial hypoglycemia    Date Last Eye Exam:      Date Last Dental Appointment:     Dates of Episodes Severe* Hypoglycemia (month/year, cumulative, ongoing, assess each visit):    *Severe=patient unconscious, seizure, unable to help self    Last 25-Vitamin D (every year):     Last DXA, lowest Z-score (every 2 years):        ?Bisphosphonates (yes/no):     Last Urine Microalbumin (every year):      No results found for: \"MICROALBUMIN\"    Last Testosterone:   Testosterone Total   Date Value Ref Range Status   05/11/2022 537 240 - 950 ng/dL Final   11/03/2020 721 ng/dL Final            Assessment and Plan:   Michele is a 58 year old male with CF, pancreatic insufficiency and history of impaired glucose tolerance    Cystic fibrosis related diabetes:  Overall glucose readings are in range based on CGM data without requiring any medications for diabetes  Patient " would like to continue using CGM  Continue to observe without starting any diabetes medications    Postprandial hypoglycemia:  Overall he is aware of activities diet that may precipitate postprandial hypoglycemia    Return to clinic in 1 year    Sincerely,    GIANNI Juarez    Video visit done using [employer]  Video visit start time 9:15 AM  Video visit end time 9:48 AM  Patient location: Home  Provider location: On-site    Time: I spent 30 minutes on the date of the encounter preparing to see patient (including chart review and preparation), obtaining and or reviewing additional medical history, performing an evaluation, documenting clinical information in the electronic health record, independently interpreting results, communicating results to the patient, and/or coordinating care.     Note: Chart documentation done in part with Dragon Voice Recognition software. Although reviewed after completion, some word and grammatical errors may remain.  Please consider this when interpreting information in this chart                Again, thank you for allowing me to participate in the care of your patient.      Sincerely,    Caity Obrien MD

## 2024-10-08 DIAGNOSIS — E84.0 CYSTIC FIBROSIS WITH PULMONARY MANIFESTATIONS (H): ICD-10-CM

## 2024-10-08 RX ORDER — ELEXACAFTOR, TEZACAFTOR, AND IVACAFTOR 100-50-75
KIT ORAL
Qty: 84 TABLET | Refills: 0 | Status: SHIPPED | OUTPATIENT
Start: 2024-10-08 | End: 2024-11-07

## 2024-10-14 DIAGNOSIS — E10.9 TYPE 1 DIABETES MELLITUS (H): ICD-10-CM

## 2024-10-16 RX ORDER — ACYCLOVIR 400 MG/1
TABLET ORAL
Qty: 10 EACH | Refills: 3 | Status: SHIPPED | OUTPATIENT
Start: 2024-10-16

## 2024-10-16 NOTE — TELEPHONE ENCOUNTER
LVD:  9/10/2024  [facility] Caity Gutierrez MD  Endocrinology, Diabetes, and Metabolism     Refilled per protocol.

## 2024-11-07 DIAGNOSIS — E84.0 CYSTIC FIBROSIS WITH PULMONARY MANIFESTATIONS (H): ICD-10-CM

## 2024-11-07 RX ORDER — ELEXACAFTOR, TEZACAFTOR, AND IVACAFTOR 100-50-75
KIT ORAL
Qty: 84 TABLET | Refills: 0 | Status: SHIPPED | OUTPATIENT
Start: 2024-11-07

## 2024-12-03 DIAGNOSIS — E84.0 CYSTIC FIBROSIS WITH PULMONARY MANIFESTATIONS (H): ICD-10-CM

## 2024-12-03 RX ORDER — ELEXACAFTOR, TEZACAFTOR, AND IVACAFTOR 100-50-75
KIT ORAL
Qty: 84 TABLET | Refills: 0 | Status: SHIPPED | OUTPATIENT
Start: 2024-12-03

## 2024-12-03 NOTE — TELEPHONE ENCOUNTER
Per Dr. Siobhan Munoz MD, will send 1 month refill. Michele to complete Trikafta labs in the next month for additional refills.    Lachelle Mojica PharmD   Cystic Fibrosis MTM Pharmacist  [facility]

## 2024-12-04 ENCOUNTER — TELEPHONE (OUTPATIENT)
Dept: PULMONOLOGY | Facility: CLINIC | Age: 59
End: 2024-12-04
Payer: COMMERCIAL

## 2024-12-04 NOTE — TELEPHONE ENCOUNTER
Prior Authorization Approval    Medication: ZENPEP [id number] UNITS PO CPEP  Authorization Effective Date: 12/4/2024  Authorization Expiration Date: 12/4/2025  Approved Dose/Quantity: 990 caps per 30 days  Reference #: [id number]   Insurance Company: Other (see comments) BCB[employer]A PAIGE  Expected CoPay: $    CoPay Card Available:      Financial Assistance Needed: Unknown  Which Pharmacy is filling the prescription: [facility] - [address]  Pharmacy Notified: Not needed  Patient Notified: Not needed

## 2024-12-04 NOTE — TELEPHONE ENCOUNTER
PA Initiation    Medication: ZENPEP [id number] UNITS PO CPEP  Insurance Company: Other (see comments)  Pharmacy Filling the Rx: [facility] - [address]  Filling Pharmacy Phone:    Filling Pharmacy Fax:    Start Date: 12/4/2024    [phone]      [email or web address]

## 2024-12-15 DIAGNOSIS — K21.9 GASTROESOPHAGEAL REFLUX DISEASE WITHOUT ESOPHAGITIS: ICD-10-CM

## 2024-12-15 DIAGNOSIS — E84.0 CYSTIC FIBROSIS WITH PULMONARY MANIFESTATIONS (H): ICD-10-CM

## 2024-12-15 DIAGNOSIS — E55.9 VITAMIN D DEFICIENCY: ICD-10-CM

## 2024-12-15 DIAGNOSIS — K86.89 PANCREATIC INSUFFICIENCY: ICD-10-CM

## 2024-12-16 ENCOUNTER — CLINICAL UPDATE (OUTPATIENT)
Dept: PHARMACY | Facility: CLINIC | Age: 59
End: 2024-12-16
Payer: COMMERCIAL

## 2024-12-16 DIAGNOSIS — E84.9 CYSTIC FIBROSIS (H): Primary | ICD-10-CM

## 2024-12-16 PROCEDURE — 99207 PR NO CHARGE LOS: CPT | Performed by: PHARMACIST

## 2024-12-16 RX ORDER — ELEXACAFTOR, TEZACAFTOR, AND IVACAFTOR 100-50-75
KIT ORAL
Qty: 84 TABLET | Refills: 2 | Status: SHIPPED | OUTPATIENT
Start: 2024-12-16

## 2024-12-16 RX ORDER — PANTOPRAZOLE SODIUM 40 MG/1
40 TABLET, DELAYED RELEASE ORAL 2 TIMES DAILY
Qty: 180 TABLET | Refills: 3 | Status: SHIPPED | OUTPATIENT
Start: 2024-12-16

## 2024-12-16 RX ORDER — AZITHROMYCIN 500 MG/1
TABLET, FILM COATED ORAL
Qty: 39 TABLET | Refills: 3 | Status: SHIPPED | OUTPATIENT
Start: 2024-12-16

## 2024-12-16 NOTE — PROGRESS NOTES
Clinical Update:                                                    A chart review was conducted for Michele Altamirano.    Reason for Chart Review: Trikafta 6 Month Lab Monitoring     Discussion: Michele has been on Trikafta since 7/20/2020.  Per chart review, patient continues full dose Trikafta .    Labs were reviewed from  12/13/24  at  [facility] . All modulator labs are within normal limits.      Plan:  1. Continue Trikafta 2 orange daily  2. Recheck hepatic panel and CK in 6 months         Lachelle Mojica, PharmD   Cystic Fibrosis MTM Pharmacist  [facility]

## 2024-12-16 NOTE — TELEPHONE ENCOUNTER
Due for [employer] labs, will be getting at his local clinic. Sent Meg Mojica, PharmD   Cystic Fibrosis MTM Pharmacist  [facility] [place]

## 2025-01-02 ENCOUNTER — TELEPHONE (OUTPATIENT)
Dept: PULMONOLOGY | Facility: CLINIC | Age: 60
End: 2025-01-02
Payer: COMMERCIAL

## 2025-01-02 NOTE — TELEPHONE ENCOUNTER
Prior Authorization Retail Medication Request    Medication/Dose: Trikafta 100-50-75 & 150mg  Diagnosis and ICD code (if different than what is on RX):    New/renewal/insurance change PA/secondary ins. PA:  Previously Tried and Failed:    Rationale:      Insurance   Primary:   Insurance ID:      Secondary (if applicable):  Insurance ID:      Pharmacy Information (if different than what is on RX)  Name:    Phone:    Fax:    Clinic Information  Preferred routing pool for dept communication:

## 2025-01-02 NOTE — TELEPHONE ENCOUNTER
PA Initiation    Medication: TRIKAFTA 100-50-75 & 150 MG PO TBPK  Insurance Company: Other (see comments)  Pharmacy Filling the Rx: [facility] - [place], [place] - [address] [facility]  Filling Pharmacy Phone:    Filling Pharmacy Fax:    Start Date: 1/2/2025        [email or web address]    EOC ID [id number]

## 2025-01-02 NOTE — TELEPHONE ENCOUNTER
Prior Authorization Approval    Medication: TRIKAFTA 100-50-75 & 150 MG PO TBPK  Authorization Effective Date: 1/2/2025  Authorization Expiration Date: 1/2/2026  Approved Dose/Quantity: 84 per 28 days  Reference #: [id number]   Insurance Company: Other (see comments)  Expected CoPay: $    CoPay Card Available:      Financial Assistance Needed: Unknown  Which Pharmacy is filling the prescription: [facility] - [address]  Pharmacy Notified: Not needed  Patient Notified: Not needed         I will STOP taking the medications listed below when I get home from the hospital:    metoprolol succinate 25 mg oral tablet, extended release  -- 1 tab(s) by mouth once a day

## 2025-01-27 DIAGNOSIS — K21.9 GASTROESOPHAGEAL REFLUX DISEASE WITHOUT ESOPHAGITIS: ICD-10-CM

## 2025-01-27 DIAGNOSIS — E55.9 VITAMIN D DEFICIENCY: ICD-10-CM

## 2025-01-27 DIAGNOSIS — E84.0 CYSTIC FIBROSIS WITH PULMONARY MANIFESTATIONS (H): ICD-10-CM

## 2025-01-27 DIAGNOSIS — K86.89 PANCREATIC INSUFFICIENCY: ICD-10-CM

## 2025-01-27 RX ORDER — PANTOPRAZOLE SODIUM 40 MG/1
40 TABLET, DELAYED RELEASE ORAL 2 TIMES DAILY
Qty: 204 TABLET | Refills: 3 | Status: SHIPPED | OUTPATIENT
Start: 2025-01-27

## 2025-01-30 ENCOUNTER — TELEPHONE (OUTPATIENT)
Dept: PULMONOLOGY | Facility: CLINIC | Age: 60
End: 2025-01-30
Payer: COMMERCIAL

## 2025-01-30 NOTE — TELEPHONE ENCOUNTER
Contacted [facility] directly at [phone] and spoke with Jenae in benefits department. She confirmed that no prior authorization is required and Zenpep claim pays at 3000 capsules for a 91 day supply. Information provided to patient.      Lachelle Mojica, PharmD   Cystic Fibrosis MTM Pharmacist  [facility]

## 2025-01-30 NOTE — TELEPHONE ENCOUNTER
Message from Michele that pharmacy told him PA is needed for cost amount. Called [facility] ([phone]) spoke to Vickie and claim is rejecting for maximum day supply. [facility] is billing 30 ds and that would need to be need to filled at local pharmacy. [facility] needs to bill 35 day supply or greater. Per her test claim over 35 day supply the claim pays. Rx sent for 102 day supply on 6/19/2024.

## 2025-03-03 NOTE — PROGRESS NOTES
[facility]  March 4, 2025         Assessment and Plan:   Michele Altamirano is a 59 year old male with cystic fibrosis.    CF lung disease with normal spirometry: Michele reports from a pulmonary point of view that he is doing well.  He has little in the way of pulmonary symptomatology.  He is able to exercise without limitation related to his lungs.  Although he is exercising less currently.  He historically has grown out Pseudomonas in his sputum.  His pulmonary function test today are stable compared to previous.  Michele does not participate in bronchial drainage or nebulized therapies at this time given his lack of symptomatology.  At this time I recommended to Michele:  -- He should certainly continue to do his vest and nebulized therapy if he were to have any worsening symptomatology  -- He is no longer doing inhalational hypertonic saline  -- He should continue to remain active is his form of bronchial drainage therapy     2. Pancreatic Insufficiency/GI: Michele has no new symptoms consistent with worsening malabsorption.  He historically has had some difficulty with gastroesophageal reflux, so has remained on a PPI and H2 blocker.  He is now off the H2 blocker and describes good control of his symptoms. Michele has longstanding abdominal fullness after eating.  This was not discussed today.  - continue the present dose of pancreatic enzymes  - continue vitamin supplementation.     3.  CFTR modulator therapy: Michele is on Trikafta and tolerating well.  He has hepatic panel and CK completed 12/2024.  We will repeat them at his next visit.     4.  Abnormal glucose tolerance: Michele has a history of abnormal glucose tolerance.  He uses a continuous glucose monitor.  He describes blood sugars averaging in the 100-135s.  He does have a post prandial peak, that improves quickly.  He is managing his blood sugars with his diet.  He does work with endocrine     5.  Osteoarthritis:  Michele reports recent bilateral shoulder pain that responded to cortisone injections.       6.  Psychosocial: Michele continues to work full-time.  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  His son is doing well at [employer].  He is nervous about [facility] because of insurance coverage.       7. HCM:  Michele will schedule colonoscopy at his local clinic.  He will obtain a DEXA at his next visit.     Immunizations: UTD  Colonoscopy: due now (2018)  Last DEXA: 2018  Last hepatic panel: 12/2024  Last sputum culture:  Collected 4/9/2024  8:03 AM    Specimen Information: Throat; Swab   1 Result Note  Culture 2+ Normal tessa   1+ Pseudomonas aeruginosa Abnormal         Annual studies due: review  Recent Labs   Lab Test 05/11/22  1107        Siobhan Munoz MD MPH  Associate Professor of Medicine  Pulmonary, Allergy, Critical Care and Sleep Medicine        Interval History:     Michele denies cough, sputum or chest congestion.  He does not do vest, nebs or inhalers.  His activity tolerance is good.         Review of Systems:     CONSTITUTIONAL: no fever, no chills, no sweats, no change in weight, no change in energy, no change in appetite--really good    INTEGUMENTARY/SKIN: no rash, no obvious new lesions    ENT/MOUTH: no sore throat, no new sinus pain, no new nasal drainage, no new nasal congestion, no ear ringing     RESPIRATORY: see interval history    CV: no chest pain, no palpitations, no peripheral edema    GI: no nausea, no vomiting, no change in stools, no fatty stools, no GERD    : negative urinary    MUSCULOSKELETAL: bilateral shoulder pain that was managed.    ENDOCRINE: no hypoglycemia, blood sugars with adequate control    NEURO:  rare headache    SLEEP: struggles falling asleep    PSYCHIATRIC: mood stable--same old          Past Medical and Surgical History:     Past Medical History:   Diagnosis Date    CAP (community acquired pneumonia)     2011    CF (cystic fibrosis) (H)     diagnosed  1969, malnutrition, staph empyema    Chronic knee pain     Diverticular disease 2018    see on colonoscopy    GERD (gastroesophageal reflux disease)     egd in the past    Pancreatic insufficiency      Past Surgical History:   Procedure Laterality Date    ENDOSCOPIC SINUS SURGERY      removal of mucocele behind eye, 1992    OPTICAL TRACKING SYSTEM ENDOSCOPIC SINUS SURGERY Bilateral 11/28/2016    Procedure: OPTICAL TRACKING SYSTEM ENDOSCOPIC SINUS SURGERY;  Surgeon: Harriett Cosby MD;  Location:  OR           Family History:     Family History   Problem Relation Age of Onset    Lipids Father     Cardiovascular Father     Diabetes Maternal Grandmother         type 2            Social History:     Social History     Socioeconomic History    Marital status:      Spouse name: Not on file    Number of children: 1    Years of education: Not on file    Highest education level: Not on file   Occupational History    Occupation:    Tobacco Use    Smoking status: Never    Smokeless tobacco: Never   Substance and Sexual Activity    Alcohol use: No     Alcohol/week: 0.0 standard drinks of alcohol    Drug use: No    Sexual activity: Yes     Partners: Female     Birth control/protection: None   Other Topics Concern    Parent/sibling w/ CABG, MI or angioplasty before 65F 55M? Not Asked   Social History Narrative    8/18/2020 --Lives in [place] with his wife and 15 yo son (Raghu).  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     Social Drivers of Health     Financial Resource Strain: Not on file   Food Insecurity: Not on file   Transportation Needs: Not on file   Physical Activity: Not on file   Stress: Not on file   Social Connections: Not on file   Interpersonal Safety: Not on file   Housing Stability: Not on file            Medications:     Current Outpatient Medications   Medication Sig Dispense Refill    azithromycin (ZITHROMAX) 500 MG tablet TAKE 1  "TABLET BY MOUTH EVERY  MONDAY, WEDNESDAY, AND FRIDAY IN THE MORNING 39 tablet 3    Continuous Blood Gluc  (DEXCOM G7 ) SAVI Use to read blood sugars as per 's instructions. 1 each 0    Continuous Glucose Sensor (DEXCOM G7 SENSOR) MISC CHANGE SENSOR EVERY 10 DAYS 10 each 3    elexacaftor-tezacaftor-ivacaftor & ivacaftor (TRIKAFTA) 100-50-75 & 150 MG tablet pack TAKE 2 ORANGE TABLETS BY MOUTH  IN THE MORNING AND 1 BLUE TABLET IN THE EVENING WITH  FAT-CONTAINING FOOD (12 HOURS  APART). 3/4: labwork due June 2025 for additional refills 84 tablet 2    hydrochlorothiazide (MICROZIDE) 12.5 MG capsule Take 12.5 mg by mouth daily      multivitamin CF formula (AQUADEKS) chewable tablet Take 2 tablets by mouth daily 60 tablet 3    pantoprazole (PROTONIX) 40 MG EC tablet Take 1 tablet (40 mg) by mouth 2 times daily. 204 tablet 3    ZENPEP 53281-42857 units CPEP TAKE 8 CAPSULES BY MOUTH 3 TIMES DAILY WITH MEALS AND 3 CAPSULES  BY MOUTH WITH SNACKS. ALLOW FOR  3 MEALS 3 SNACKS PER DAY 3000 capsule 3    albuterol (PROAIR HFA/PROVENTIL HFA/VENTOLIN HFA) 108 (90 Base) MCG/ACT inhaler USE 2 INHALATIONS ORALLY   EVERY 4 HOURS AS NEEDED FORSHORTNESS OF BREATH/DYSPNEAOR WHEEZING (Patient not taking: Reported on 3/4/2025) 54 g 3    Respiratory Therapy Supplies (EDU ALTERA NEBULIZER HANDSET) MISC 1 Device 3 times daily 28 days on, 28 days off. (Patient not taking: Reported on 3/4/2025) 1 each 5    sodium chloride inhalant 7 % NEBU neb solution TAKE 4 MLS BY NEBULIZATION DAILY (Patient not taking: Reported on 3/4/2025) 720 mL 3     No current facility-administered medications for this visit.            Physical Exam:   BP (!) 143/83   Pulse 71   Ht 1.735 m (5' 8.31\")   Wt 70.4 kg (155 lb 3.3 oz)   SpO2 96%   BMI 23.39 kg/m      Constitutional:   Awake, alert and in no apparent distress     Eyes:   nonicteric     ENT:   oral mucosa moist without lesions, normal tm bilaterally, bilateral mucosa normal "     Neck:   Supple without supraclavicular or cervical lymphadenopathy     Lungs:   Fair air flow.  No crackles. No rhonchi.  No wheezes.     Cardiovascular:   Normal S1 and S2.  RRR.  No murmur, gallop or rub.     Abdomen:   NABS, soft, nontender, nondistended.      Musculoskeletal:   No edema, digital clubbing present     Neurologic:   Alert and conversant.     Skin:   Warm, dry.  No rash on limited exam.             Data:   All laboratory and imaging data reviewed.      Results from the last week:  Recent Results (from the past week)   General PFT Lab (Please always keep checked)    Collection Time: 03/04/25  8:51 AM   Result Value Ref Range    FVC-Pred 4.08 L    FVC-Pre 3.97 L    FVC-%Pred-Pre 97 %    FEV1-Pre 2.60 L    FEV1-%Pred-Pre 80 %    FEV1FVC-Pred 79 %    FEV1FVC-Pre 65 %    FEFMax-Pred 8.96 L/sec    FEFMax-Pre 7.98 L/sec    FEFMax-%Pred-Pre 89 %    FEF2575-Pred 2.81 L/sec    FEF2575-Pre 1.12 L/sec    PVA4428-%Pred-Pre 39 %    ExpTime-Pre 11.41 sec    FIFMax-Pre 6.54 L/sec    FEV1FEV6-Pred 79 %    FEV1FEV6-Pre 70 %       PFT interpretation:   Spirometry interpretation:  The spirometry is normal.  When compared to 4/9/2024, the FEV1 and FVC have little change.  The testing meets ATS criteria.    Severity Z-score*   Normal > -1.645   Mild -1.65 to -2.5   Moderate -2.5 to -4   Severe < -4   * accounts for sex, age, height, ancestry     Use z-score to assess airflow obstruction, restriction and DLCO  - FEV1 z-score for airflow obstruction  - TLC z-score for restriction  - DLCO z-score for diffusion defect    Pulmonary exacerbation: absent    50 minutes spent by me on the date of the encounter doing chart review, history and exam, documentation and further activities per the note  Time documented is excluding time spent for PFT interpretation.

## 2025-03-03 NOTE — PATIENT INSTRUCTIONS
Cystic Fibrosis Self-Care Plan    RECOMMENDATIONS:     Michele, It was great to see you today.  The plan from today:  --colonoscopy sometime soon  --check blood pressure at home  --primary care appointment  --you need better control of your blood pressure  --dexa scan at next visit  Great job with self cares!    YOUR GOAL:  Stay safe and well. Enjoy the winter weather!      Cystic Fibrosis :    Suzie Raya   [phone]  [place] [facility] [place] Nurse line:  Guille Barney  [phone]     [facility] [place] Fax Number:      [phone]         Cystic Fibrosis Respiratory Therapists:   Yaz Barragan                          [phone]          Consuelo Bonilla   [phone]  Cystic Fibrosis Dietitians:              Willa Romero              [phone]                            Alondra Vitale                        [phone]   Cystic Fibrosis Diabetes Nurse:    Livia Mondragon               [phone]    Cystic Fibrosis Social Workers:     Tiffany Mustafa              [phone]                     Rayna Díaz               [phone]  Cystic Fibrosis Pharmacists:           Lachelle Mojica                              [phone]         Liset Cee      [phone]   Cystic Fibrosis Genetic Counselor:   Charisse Reveles    [phone]    [facility] [place] website:  [email or web address]    We have recently learned about an albuterol neb solution shortage due to a manufacturing delay. There is still a small supply coming in but not enough to meet the current demand. We do not yet have an estimate for when this will become widely available again.    We our asking our CF community to do the following:    Please take time to check your supply of albuterol neb solution at home. We recommend keeping at least a 2-week supply of albuterol nebs at home in case of illness.    2.  If you have an albuterol inhaler at home, you can use 4 puffs of the inhaler with a  spacer in place of the nebulized albuterol at the start of your treatments. It is important to use a spacer for the best technique. If you do not have a spacer at home or have questions on technique, we will be happy to review and send one to your home address. Please also take a moment to check that your albuterol HFA inhaler is available and not .  inhalers may be less effective as the medication loses its potency or power. In some instances your team may suggest another alternative instead of an albuterol inhaler.    3.  Please take care in requesting refills. Albuterol neb solution is a life-saving medication for patients having severe asthma attacks and other emergency respiratory conditions. Let s work together to make sure that albuterol neb solution is available to those who need it urgently.    Reach out to your care team with questions and to confirm your planned alternative for albuterol nebs. Canary Calendarhart will be the fastest way to connect. If possible, please reserve the nursing line for more urgent concerns as we are short on nursing staff.    Here are some reputable sites with more information:    https://www.[email or web address]/resilient-drug-supply/us-liquid-albuterol-yygoxwzd-kyzawquj-oqtvpo-after-[email or web address]-shuts-down    https://www.[employer]//health/albuterol-shortage    https://www.ashp.org/drug-shortages/current-shortages/drug-shortage-detail.aspx?ff=310&loginreturnUrl=SSOCheckOnly       MRN: [id number]   Clinic Date: March 3, 2025   Patient: Michele Altamirano     Annual Studies:   IGG   Date Value Ref Range Status   2018 883 695 - 1,620 mg/dL Final     Immunoglobulin G   Date Value Ref Range Status   2022 779 610 - 1,616 mg/dL Final     Insulin   Date Value Ref Range Status   2018 36.2 (H) 3 - 25 mU/L Final     Comment:     Starting 2017, insulin results will decrease by approximately 37%   compared to insulin results reported in EPIC between  (12/16/2016-7/12/2017),   and should be interpreted accordingly. Insulin results reported prior to   12/16/16 are comparable to current insulin results and therefore no adjustment   is needed.       There are no preventive care reminders to display for this patient.    Pulmonary Function Tests  FEV1: amount of air you can blow out in 1 second  FVC: total amount of air you can take in and blow out    Your Goals:             Latest Ref Rng & Units 4/9/2024     6:53 AM   PFT   FVC L 4.12  P   FEV1 L 2.69  P   FVC% % 98  P   FEV1% % 81  P      P Preliminary result          Airway Clearance: The Most Important Way to Keep Your Lungs Healthy  Vest Settings:   Hill-Rom Frequencies: 8, 9, 10 Pressure 10 Then, Frequencies 18, 19, 20 Pressure 6     RespirTech: Quick Start with Pressure of     Do each frequency for 5 minutes; Deflate vest after each frequency & cough 3 times before beginning the next setting.    Vest and Neb Therapy should be done 1 times/day.    Good Nutrition Can Improve Lung Function and Overall Health    Take ALL of your vitamins with food    Take 1/2 of your enzymes before EVERY meal/snack and the other 1/2 mid-meal/snack    Wt Readings from Last 3 Encounters:   04/09/24 69.4 kg (153 lb)   07/25/23 74.4 kg (164 lb)   11/22/22 77.1 kg (170 lb)       There is no height or weight on file to calculate BMI.         National CF Foundation Recommendations for BMI in CF Adults: Women: at least 22 Men: at least 23        Controlling Blood Sugars Helps Prevent Lung Infections & Improves Nutrition  Test blood sugar:    In the morning before eating (goal is )    2 hours after a meal (goal is less than 150)    When pre-meal glucose is greater than 150 add correction    At bedtime (if less than 100 eat a snack with 15 grams of carbohydrates  Last A1C Results:   Hemoglobin A1C   Date Value Ref Range Status   05/11/2022 5.9 (H) 0.0 - 5.6 % Final     Comment:     Normal <5.7%   Prediabetes 5.7-6.4%    Diabetes  6.5% or higher     Note: Adopted from ADA consensus guidelines.   11/03/2020 6.1 (A) 0 - 5.6 % Final         If diabetic, measure A1C every 6 months. Goal: Under 7%    Staying Healthy  Research:  If you are interested in learning about research opportunities or have questions, please contact the CF Research Team at [phone] or [email or web address].    CF Foundation:  Compass is a personalized resource service to help you with the insurance, financial, legal and other issues you are facing.  It's free, confidential and available to anyone with CF.  Ask your  for more information or contact Compass directly at 250-COMPASS ([phone]) or compass@cff.org, or learn more at cff.org/compass.

## 2025-03-04 ENCOUNTER — OFFICE VISIT (OUTPATIENT)
Dept: PULMONOLOGY | Facility: CLINIC | Age: 60
End: 2025-03-04
Attending: INTERNAL MEDICINE
Payer: COMMERCIAL

## 2025-03-04 VITALS
HEART RATE: 71 BPM | OXYGEN SATURATION: 96 % | SYSTOLIC BLOOD PRESSURE: 143 MMHG | HEIGHT: 68 IN | WEIGHT: 155.2 LBS | DIASTOLIC BLOOD PRESSURE: 83 MMHG | BODY MASS INDEX: 23.52 KG/M2

## 2025-03-04 DIAGNOSIS — E84.0 CYSTIC FIBROSIS WITH PULMONARY MANIFESTATIONS (H): Primary | ICD-10-CM

## 2025-03-04 DIAGNOSIS — K86.89 PANCREATIC INSUFFICIENCY: ICD-10-CM

## 2025-03-04 DIAGNOSIS — E84.9 CYSTIC FIBROSIS (H): Primary | ICD-10-CM

## 2025-03-04 LAB
EXPTIME-PRE: 11.41 SEC
FEF2575-%PRED-PRE: 39 %
FEF2575-PRE: 1.12 L/SEC
FEF2575-PRED: 2.81 L/SEC
FEFMAX-%PRED-PRE: 89 %
FEFMAX-PRE: 7.98 L/SEC
FEFMAX-PRED: 8.96 L/SEC
FEV1-%PRED-PRE: 80 %
FEV1-PRE: 2.6 L
FEV1FEV6-PRE: 70 %
FEV1FEV6-PRED: 79 %
FEV1FVC-PRE: 65 %
FEV1FVC-PRED: 79 %
FIFMAX-PRE: 6.54 L/SEC
FVC-%PRED-PRE: 97 %
FVC-PRE: 3.97 L
FVC-PRED: 4.08 L

## 2025-03-04 PROCEDURE — 99213 OFFICE O/P EST LOW 20 MIN: CPT | Performed by: INTERNAL MEDICINE

## 2025-03-04 PROCEDURE — 3077F SYST BP >= 140 MM HG: CPT | Performed by: INTERNAL MEDICINE

## 2025-03-04 PROCEDURE — 87070 CULTURE OTHR SPECIMN AEROBIC: CPT | Performed by: INTERNAL MEDICINE

## 2025-03-04 PROCEDURE — 99215 OFFICE O/P EST HI 40 MIN: CPT | Mod: 25 | Performed by: INTERNAL MEDICINE

## 2025-03-04 PROCEDURE — 3079F DIAST BP 80-89 MM HG: CPT | Performed by: INTERNAL MEDICINE

## 2025-03-04 PROCEDURE — 94375 RESPIRATORY FLOW VOLUME LOOP: CPT | Performed by: INTERNAL MEDICINE

## 2025-03-04 PROCEDURE — 1126F AMNT PAIN NOTED NONE PRSNT: CPT | Performed by: INTERNAL MEDICINE

## 2025-03-04 RX ORDER — ELEXACAFTOR, TEZACAFTOR, AND IVACAFTOR 100-50-75
KIT ORAL
Qty: 84 TABLET | Refills: 2 | Status: SHIPPED | OUTPATIENT
Start: 2025-03-04

## 2025-03-04 ASSESSMENT — PAIN SCALES - GENERAL: PAINLEVEL_OUTOF10: NO PAIN (0)

## 2025-03-04 NOTE — PROGRESS NOTES
Michele Altamirano comes into clinic today at the request of Dr. Munoz Ordering Provider for spirometry       Rian Ha, RRT

## 2025-03-04 NOTE — NURSING NOTE
Chief Complaint   Patient presents with    Follow Up     Return CF        Vitals were taken, medications reconciled.    Jaskaran Carbajal, Clinic Assistant   9:44 AM

## 2025-03-04 NOTE — LETTER
3/4/2025      Michele Altamirano  [address] [address]      Dear Colleague,    Thank you for referring your patient, Michele Altamirano, to the [facility] [place]. Please see a copy of my visit note below.    [facility]  March 4, 2025         Assessment and Plan:   Michele Altamirano is a 59 year old male with cystic fibrosis.    CF lung disease with normal spirometry: Michele reports from a pulmonary point of view that he is doing well.  He has little in the way of pulmonary symptomatology.  He is able to exercise without limitation related to his lungs.  Although he is exercising less currently.  He historically has grown out Pseudomonas in his sputum.  His pulmonary function test today are stable compared to previous.  Michele does not participate in bronchial drainage or nebulized therapies at this time given his lack of symptomatology.  At this time I recommended to Michele:  -- He should certainly continue to do his vest and nebulized therapy if he were to have any worsening symptomatology  -- He is no longer doing inhalational hypertonic saline  -- He should continue to remain active is his form of bronchial drainage therapy     2. Pancreatic Insufficiency/GI: Michele has no new symptoms consistent with worsening malabsorption.  He historically has had some difficulty with gastroesophageal reflux, so has remained on a PPI and H2 blocker.  He is now off the H2 blocker and describes good control of his symptoms. Michele has longstanding abdominal fullness after eating.  This was not discussed today.  - continue the present dose of pancreatic enzymes  - continue vitamin supplementation.     3.  CFTR modulator therapy: Michele is on Trikafta and tolerating well.  He has hepatic panel and CK completed 12/2024.  We will repeat them at his next visit.     4.  Abnormal glucose tolerance: Michele has a history of abnormal glucose  tolerance.  He uses a continuous glucose monitor.  He describes blood sugars averaging in the 100-135s.  He does have a post prandial peak, that improves quickly.  He is managing his blood sugars with his diet.  He does work with endocrine     5.  Osteoarthritis: Michele reports recent bilateral shoulder pain that responded to cortisone injections.       6.  Psychosocial: Michele continues to work full-time.  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  His son is doing well at [employer].  He is nervous about half-way because of insurance coverage.       7. HCM:  Michele will schedule colonoscopy at his local clinic.  He will obtain a DEXA at his next visit.     Immunizations: UTD  Colonoscopy: due now (2018)  Last DEXA: 2018  Last hepatic panel: 12/2024  Last sputum culture:  Collected 4/9/2024  8:03 AM    Specimen Information: Throat; Swab   1 Result Note  Culture 2+ Normal tessa   1+ Pseudomonas aeruginosa Abnormal         Annual studies due: review  Recent Labs   Lab Test 05/11/22  1107        Siobhan Munoz MD MPH  Associate Professor of Medicine  Pulmonary, Allergy, Critical Care and Sleep Medicine        Interval History:     Michele denies cough, sputum or chest congestion.  He does not do vest, nebs or inhalers.  His activity tolerance is good.         Review of Systems:     CONSTITUTIONAL: no fever, no chills, no sweats, no change in weight, no change in energy, no change in appetite--really good    INTEGUMENTARY/SKIN: no rash, no obvious new lesions    ENT/MOUTH: no sore throat, no new sinus pain, no new nasal drainage, no new nasal congestion, no ear ringing     RESPIRATORY: see interval history    CV: no chest pain, no palpitations, no peripheral edema    GI: no nausea, no vomiting, no change in stools, no fatty stools, no GERD    : negative urinary    MUSCULOSKELETAL: bilateral shoulder pain that was managed.    ENDOCRINE: no hypoglycemia, blood sugars with adequate control    NEURO:   rare headache    SLEEP: struggles falling asleep    PSYCHIATRIC: mood stable--same old          Past Medical and Surgical History:     Past Medical History:   Diagnosis Date     CAP (community acquired pneumonia)     2011     CF (cystic fibrosis) (H)     diagnosed 1969, malnutrition, staph empyema     Chronic knee pain      Diverticular disease 2018    see on colonoscopy     GERD (gastroesophageal reflux disease)     egd in the past     Pancreatic insufficiency      Past Surgical History:   Procedure Laterality Date     ENDOSCOPIC SINUS SURGERY      removal of mucocele behind eye, 1992     OPTICAL TRACKING SYSTEM ENDOSCOPIC SINUS SURGERY Bilateral 11/28/2016    Procedure: OPTICAL TRACKING SYSTEM ENDOSCOPIC SINUS SURGERY;  Surgeon: Harriett Cosby MD;  Location:  OR           Family History:     Family History   Problem Relation Age of Onset     Lipids Father      Cardiovascular Father      Diabetes Maternal Grandmother         type 2            Social History:     Social History     Socioeconomic History     Marital status:      Spouse name: Not on file     Number of children: 1     Years of education: Not on file     Highest education level: Not on file   Occupational History     Occupation:    Tobacco Use     Smoking status: Never     Smokeless tobacco: Never   Substance and Sexual Activity     Alcohol use: No     Alcohol/week: 0.0 standard drinks of alcohol     Drug use: No     Sexual activity: Yes     Partners: Female     Birth control/protection: None   Other Topics Concern     Parent/sibling w/ CABG, MI or angioplasty before 65F 55M? Not Asked   Social History Narrative    8/18/2020 --Lives in [place] with his wife and 15 yo son (Raghu).  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     Social Drivers of Health     Financial Resource Strain: Not on file   Food Insecurity: Not on file   Transportation Needs: Not on file   Physical  Activity: Not on file   Stress: Not on file   Social Connections: Not on file   Interpersonal Safety: Not on file   Housing Stability: Not on file            Medications:     Current Outpatient Medications   Medication Sig Dispense Refill     azithromycin (ZITHROMAX) 500 MG tablet TAKE 1 TABLET BY MOUTH EVERY  MONDAY, WEDNESDAY, AND FRIDAY IN THE MORNING 39 tablet 3     Continuous Blood Gluc  (DEXCOM G7 ) SAVI Use to read blood sugars as per 's instructions. 1 each 0     Continuous Glucose Sensor (DEXCOM G7 SENSOR) MISC CHANGE SENSOR EVERY 10 DAYS 10 each 3     elexacaftor-tezacaftor-ivacaftor & ivacaftor (TRIKAFTA) 100-50-75 & 150 MG tablet pack TAKE 2 ORANGE TABLETS BY MOUTH  IN THE MORNING AND 1 BLUE TABLET IN THE EVENING WITH  FAT-CONTAINING FOOD (12 HOURS  APART). 3/4: labwork due June 2025 for additional refills 84 tablet 2     hydrochlorothiazide (MICROZIDE) 12.5 MG capsule Take 12.5 mg by mouth daily       multivitamin CF formula (AQUADEKS) chewable tablet Take 2 tablets by mouth daily 60 tablet 3     pantoprazole (PROTONIX) 40 MG EC tablet Take 1 tablet (40 mg) by mouth 2 times daily. 204 tablet 3     ZENPEP 35665-66247 units CPEP TAKE 8 CAPSULES BY MOUTH 3 TIMES DAILY WITH MEALS AND 3 CAPSULES  BY MOUTH WITH SNACKS. ALLOW FOR  3 MEALS 3 SNACKS PER DAY 3000 capsule 3     albuterol (PROAIR HFA/PROVENTIL HFA/VENTOLIN HFA) 108 (90 Base) MCG/ACT inhaler USE 2 INHALATIONS ORALLY   EVERY 4 HOURS AS NEEDED FORSHORTNESS OF BREATH/DYSPNEAOR WHEEZING (Patient not taking: Reported on 3/4/2025) 54 g 3     Respiratory Therapy Supplies (EDU ALTERA NEBULIZER HANDSET) MISC 1 Device 3 times daily 28 days on, 28 days off. (Patient not taking: Reported on 3/4/2025) 1 each 5     sodium chloride inhalant 7 % NEBU neb solution TAKE 4 MLS BY NEBULIZATION DAILY (Patient not taking: Reported on 3/4/2025) 720 mL 3     No current facility-administered medications for this visit.            Physical Exam:  "  BP (!) 143/83   Pulse 71   Ht 1.735 m (5' 8.31\")   Wt 70.4 kg (155 lb 3.3 oz)   SpO2 96%   BMI 23.39 kg/m      Constitutional:   Awake, alert and in no apparent distress     Eyes:   nonicteric     ENT:   oral mucosa moist without lesions, normal tm bilaterally, bilateral mucosa normal     Neck:   Supple without supraclavicular or cervical lymphadenopathy     Lungs:   Fair air flow.  No crackles. No rhonchi.  No wheezes.     Cardiovascular:   Normal S1 and S2.  RRR.  No murmur, gallop or rub.     Abdomen:   NABS, soft, nontender, nondistended.      Musculoskeletal:   No edema, digital clubbing present     Neurologic:   Alert and conversant.     Skin:   Warm, dry.  No rash on limited exam.             Data:   All laboratory and imaging data reviewed.      Results from the last week:  Recent Results (from the past week)   General PFT Lab (Please always keep checked)    Collection Time: 03/04/25  8:51 AM   Result Value Ref Range    FVC-Pred 4.08 L    FVC-Pre 3.97 L    FVC-%Pred-Pre 97 %    FEV1-Pre 2.60 L    FEV1-%Pred-Pre 80 %    FEV1FVC-Pred 79 %    FEV1FVC-Pre 65 %    FEFMax-Pred 8.96 L/sec    FEFMax-Pre 7.98 L/sec    FEFMax-%Pred-Pre 89 %    FEF2575-Pred 2.81 L/sec    FEF2575-Pre 1.12 L/sec    ZNP6150-%Pred-Pre 39 %    ExpTime-Pre 11.41 sec    FIFMax-Pre 6.54 L/sec    FEV1FEV6-Pred 79 %    FEV1FEV6-Pre 70 %       PFT interpretation:   Spirometry interpretation:  The spirometry is normal.  When compared to 4/9/2024, the FEV1 and FVC have little change.  The testing meets ATS criteria.    Severity Z-score*   Normal > -1.645   Mild -1.65 to -2.5   Moderate -2.5 to -4   Severe < -4   * accounts for sex, age, height, ancestry     Use z-score to assess airflow obstruction, restriction and DLCO  - FEV1 z-score for airflow obstruction  - TLC z-score for restriction  - DLCO z-score for diffusion defect    Pulmonary exacerbation: absent    50 minutes spent by me on the date of the encounter doing chart review, history " and exam, documentation and further activities per the note  Time documented is excluding time spent for PFT interpretation.    Nutrition Note    Michele is due for annual nutrition assessment per CF clinic protocol. Pt declined to see ancillary staff today via [employer] message.     RD will remain available.     Willa Romero RD, LD, CACFD  Cystic Fibrosis/Lung Transplant Dietitian  Available via [employer]             Respiratory Therapy Note:     Pt due for annual respiratory assessment, however he declined to stay and see ancillary staff. Will attempt to see at upcoming clinic visits as able.       Again, thank you for allowing me to participate in the care of your patient.        Sincerely,        Siobhan Munoz MD    Electronically signed

## 2025-03-04 NOTE — PROGRESS NOTES
Nutrition Note    Michele is due for annual nutrition assessment per CF clinic protocol. Pt declined to see ancillary staff today via [employer] message.     RD will remain available.     Willa Romero RD, LD, CACFD  Cystic Fibrosis/Lung Transplant Dietitian  Available via [employer]

## 2025-03-04 NOTE — PROGRESS NOTES
Respiratory Therapy Note:     Pt due for annual respiratory assessment, however he declined to stay and see ancillary staff. Will attempt to see at upcoming clinic visits as able.

## 2025-03-05 ENCOUNTER — TELEPHONE (OUTPATIENT)
Dept: PULMONOLOGY | Facility: CLINIC | Age: 60
End: 2025-03-05
Payer: COMMERCIAL

## 2025-03-05 NOTE — TELEPHONE ENCOUNTER
Patient Contacted for the patient to call back and schedule the following:    Appointment type: [facility]  Provider: FLO  Return date: 10/14/2025  Specialty phone number: [phone]  Additional appointment(s) needed: DEXA  Additonal Notes: Patient declined to schedule dexa

## 2025-03-06 LAB
BACTERIA SPEC CULT: ABNORMAL
BACTERIA SPEC CULT: ABNORMAL

## 2025-03-09 LAB
BACTERIA SPEC CULT: ABNORMAL
BACTERIA SPEC CULT: ABNORMAL

## 2025-05-20 NOTE — PROGRESS NOTES
[facility]  [facility]  October 24, 2018         Assessment and Plan:   Michele Altamirano is a 52 year old male with cystic fibrosis.    1. CF lung disease with moderate obstruction:  Michele does report that he has been doing well.  He does feel that his only adverse effect from being on Orkambi therapy is hypertension.  From a pulmonary point of view, he noticed no significant change.  He had been remaining quite active until he had a hamstring pull a couple months ago.  From a pulmonary point of view, he does report that his cough frequency and sputum volume are fairly stable.  He is tolerating inhalational Cayston well.  He only rarely does vest therapy.  Michele continues to show evidence of Pseudomonas and Staph in his sputum.  At this time, I would recommend:   -- That he continue on his present bronchial drainage therapy.   -- He should continue on his Cayston inhalational therapy.     2.  CFTR modulator therapy.  Michele and I did discuss changing to Symdeko from Orkambi.  After conversation with our pharmacist and some education he is wishing to make that change.  He will need to have his liver function tests monitored again every 3 months.  We can certainly arrange to have this done at his local clinic.     3.  Hypertension.  Michele has had worsening of his blood pressure since initiating Orkambi.  He is now on hydrochlorothiazide with fair control.  He was instructed to get a home unit so that he can check his blood pressure during the week.  He will follow it pre-initiation of change to Symdeko and then post.  I did ask him to follow up with his primary care physician regarding increasing his dose of hydrochlorothiazide.     4.  Hamstring injury.  Michele is managing this without event.  He reports overall it is improved.     5.  History of impaired glucose tolerance.  Michele describes only rare testing of his blood sugars.  When he did test it during a bowel prep recently it was  [Fever] : no fever as high as 225 with sugary drinks.  He has no concerning symptoms per him.     6.  Psychosocial.  Michele is .  His wife also works full-time.  They have a son, Raghu, who is 14.  He discussed today how he will probably retire in 4 years' time.  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     7. Pancreatic Insufficiency:  The patient has no new symptoms consistent with worsening malabsorption.    - continue the present dose of pancreatic enzymes  - continue vitamin supplementation.    HCM: 2/2019  Immunizations:Flu shot 2 weeks ago  Colonoscopy: performed this year--diverticular disease    Siobhan Munoz MD MPH  Associate Professor of Medicine  Pulmonary, Allergy, Critical Care and Sleep Medicine      Interval History:     Michele does report that his cough frequency and sputum volume are consistent.  He is always producing green sputum and produces more when he does inhalation of Cayston.  He does try to do Cayston every other month.  He describes only the rare use of the vest.  His activity tolerance does remain quite good.          Review of Systems:     CONSTITUTIONAL: no fever, no chills, no change in weight, no change in energy, no change in appetite    INTEGUMENTARY/SKIN: no rash, no obvious new lesions    ENT/MOUTH: no sore throat, no new sinus pain, no new nasal drainage, no hearing loss, no ear ringing     RESPIRATORY: see interval history    CV: no chest pain, no palpitations, no peripheral edema, no orthopnea, no PND    GI: no nausea, no vomiting, no change in stools, no fatty stools, ++ GERD, no abdominal pain    : no dysuria, no urinary frequency    MUSCULOSKELETAL: recent ham string pull    ENDOCRINE: no excessive thirst, blood sugars not checked    NEURO:  No headache, no numbness, no tingling    SLEEP: no issues    PSYCHIATRIC: mood stable          Past Medical and Surgical History:     Past Medical History:   Diagnosis Date     CAP  (community acquired pneumonia)     2011     CF (cystic fibrosis) (H)     diagnosed 1969, malnutrition, staph empyema     Chronic knee pain      Diverticular disease 2018    see on colonoscopy     GERD (gastroesophageal reflux disease)     egd in the past     Pancreatic insufficiency      Past Surgical History:   Procedure Laterality Date     ENDOSCOPIC SINUS SURGERY      removal of mucocele behind eye, 1992     OPTICAL TRACKING SYSTEM ENDOSCOPIC SINUS SURGERY Bilateral 11/28/2016    Procedure: OPTICAL TRACKING SYSTEM ENDOSCOPIC SINUS SURGERY;  Surgeon: Harriett Cosby MD;  Location: [facility] OR           Family History:     Family History   Problem Relation Age of Onset     Lipids Father      Cardiovascular Father      Diabetes Maternal Grandmother      type 2            Social History:     Social History     Social History     Marital status:      Spouse name: N/A     Number of children: 1     Years of education: N/A     Occupational History           Social History Main Topics     Smoking status: Never Smoker     Smokeless tobacco: Never Used     Alcohol use No     Drug use: No     Sexual activity: Yes     Partners: Female     Birth control/ protection: None     Other Topics Concern     Not on file     Social History Narrative    Nov 2015--Lives in [place] with his wife and 10 yo son.  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            Medications:     Current Outpatient Prescriptions   Medication     albuterol (PROAIR HFA, PROVENTIL HFA, VENTOLIN HFA) 108 (90 BASE) MCG/ACT inhaler     amylase-lipase-protease (ZENPEP) 02748-97409 units CPEP     azithromycin (ZITHROMAX) 500 MG tablet     aztreonam lysine (CAYSTON) 75 MG SOLR     Cholecalciferol (VITAMIN D) 2000 UNITS CAPS     hydrochlorothiazide (MICROZIDE) 12.5 MG capsule     lumacaftor-ivacaftor (ORKAMBI) 200-125 MG tablet     multivitamin CF formula (AQUADEKS) chewable tablet      [Chills] : no chills "pantoprazole (PROTONIX) 40 MG EC tablet     EDU ALTERA NEBULIZER SYSTEM MISC     ranitidine (ZANTAC) 150 MG tablet     Respiratory Therapy Supplies (EDU ALTERA NEBULIZER HANDSET) MISC     sodium chloride inhalant 7 % NEBU neb solution     ZENPEP 23888 UNITS CPEP per EC capsule     No current facility-administered medications for this visit.             Physical Exam:   BP (!) 145/93  Pulse 69  Resp 17  Ht 1.75 m (5' 8.9\")  Wt 71 kg (156 lb 8.4 oz)  SpO2 98%  BMI 23.18 kg/m2    Constitutional:   Awake, alert and in no apparent distress     Eyes:   nonicteric     ENT:   oral mucosa moist without lesions, normal tm bilaterally, bilateral mucosal edema      Neck:   Supple without supraclavicular or cervical lymphadenopathy     Lungs:   Good air flow.  No crackles. No rhonchi.  No wheezes.     Cardiovascular:   Normal S1 and S2.  RRR.  No murmur, gallop or rub.     Abdomen:   NABS, soft, nontender, nondistended.       Musculoskeletal:   No edema, digital clubbing present     Neurologic:   Alert and conversant.     Skin:   Warm, dry.  No rash on limited exam.             Data:   All laboratory and imaging data reviewed.    Cystic Fibrosis Culture  Specimen Description   Date Value Ref Range Status   02/09/2018 Sputum  Final   12/22/2017 Sputum  Final   01/12/2016 Sputum  Final   01/12/2016 Sputum  Final   01/12/2016 Sputum  Final    Culture Micro   Date Value Ref Range Status   02/09/2018 Moderate growth  Normal tessa    Final   02/09/2018 (A)  Final    Moderate growth  Pseudomonas aeruginosa, mucoid strain     02/09/2018 Moderate growth  Pseudomonas aeruginosa   (A)  Final   02/09/2018 Light growth  Staphylococcus aureus   (A)  Final        Recent Results (from the past 168 hour(s))   General PFT Lab (Please always keep checked)    Collection Time: 10/24/18  8:35 AM   Result Value Ref Range    FVC-Pred 4.74 L    FVC-Pre 3.82 L    FVC-%Pred-Pre 80 %    FEV1-Pre 2.51 L    FEV1-%Pred-Pre 67 %    FEV1FVC-Pred 77 %    " [SOB] : no shortness of breath FEV1FVC-Pre 66 %    FEFMax-Pred 9.48 L/sec    FEFMax-Pre 7.82 L/sec    FEFMax-%Pred-Pre 82 %    FEF2575-Pred 3.37 L/sec    FEF2575-Pre 1.35 L/sec    PFG1229-%Pred-Pre 39 %    ExpTime-Pre 10.83 sec    FIFMax-Pre 7.43 L/sec    FEV1FEV6-Pred 80 %    FEV1FEV6-Pre 70 %       PFT: Moderate obstructive lung disease.  When compared to 2/90/2018, the FEV1 and FVC have little change.  The decrease in FVC may represent air trapping v. restrictive physiology.  Lung volumes would be necessary to determine.    Pulmonary exacerbation: absent       [Palpitations] : no palpitations

## 2025-06-01 DIAGNOSIS — E84.0 CYSTIC FIBROSIS WITH PULMONARY MANIFESTATIONS (H): ICD-10-CM

## 2025-06-02 RX ORDER — ELEXACAFTOR, TEZACAFTOR, AND IVACAFTOR 100-50-75
KIT ORAL
Qty: 84 TABLET | Refills: 0 | Status: SHIPPED | OUTPATIENT
Start: 2025-06-02

## 2025-06-22 ENCOUNTER — HEALTH MAINTENANCE LETTER (OUTPATIENT)
Age: 60
End: 2025-06-22

## 2025-06-25 ENCOUNTER — TELEPHONE (OUTPATIENT)
Dept: ENDOCRINOLOGY | Facility: CLINIC | Age: 60
End: 2025-06-25
Payer: COMMERCIAL

## 2025-06-25 NOTE — TELEPHONE ENCOUNTER
Patient Contacted for the patient to call back and schedule the following:    Appointment type: [facility]  Provider: RENE  Return date: SEPT 2025  Specialty phone number: [phone]  Additional appointment(s) needed: NA  Additonal Notes: NA

## 2025-06-29 DIAGNOSIS — E84.0 CYSTIC FIBROSIS WITH PULMONARY MANIFESTATIONS (H): ICD-10-CM

## 2025-06-30 DIAGNOSIS — E84.9 CYSTIC FIBROSIS (H): ICD-10-CM

## 2025-06-30 RX ORDER — PANCRELIPASE LIPASE, PANCRELIPASE PROTEASE, PANCRELIPASE AMYLASE 20000; 63000; 84000 [USP'U]/1; [USP'U]/1; [USP'U]/1
CAPSULE, DELAYED RELEASE ORAL
Qty: 3000 CAPSULE | Refills: 3 | Status: SHIPPED | OUTPATIENT
Start: 2025-06-30

## 2025-06-30 NOTE — TELEPHONE ENCOUNTER
"Medication Refill                                                     Refill request receive for: Zenpep    Last refill: 6/19/2024    Last Office Visit: 3/4/2025    Future appt scheduled? Yes:  Date 10/14/2025     POC for medication if indicated from last note including duration of treatment if applicable: \"continue the present dose of pancreatic enzymes\"      RECENT LABS/VITALS                                                        Lab Results   Component Value Date    AST 31 05/11/2022    AST 44 03/01/2021     Lab Results   Component Value Date    ALT 46 05/11/2022    ALT 50 03/01/2021     Creatinine   Date Value Ref Range Status   05/11/2022 0.90 0.66 - 1.25 mg/dL Final   11/03/2020 0.9 mg/dL Final   ]  Alkaline Phosphatase   Date/Time Value Ref Range Status   05/11/2022 11:07 AM 78 40 - 150 U/L Final   03/01/2021 04:32 PM 98 U/L Final     No results found for: \"LABAPCBCDIFF\"                   "

## 2025-07-02 RX ORDER — ELEXACAFTOR, TEZACAFTOR, AND IVACAFTOR 100-50-75
KIT ORAL
Qty: 84 TABLET | Refills: 0 | OUTPATIENT
Start: 2025-07-02

## 2025-07-03 DIAGNOSIS — E84.0 CYSTIC FIBROSIS WITH PULMONARY MANIFESTATIONS (H): ICD-10-CM

## 2025-07-03 RX ORDER — ELEXACAFTOR, TEZACAFTOR, AND IVACAFTOR 100-50-75
KIT ORAL
Qty: 84 TABLET | Refills: 0 | Status: SHIPPED | OUTPATIENT
Start: 2025-07-03

## 2025-07-08 ENCOUNTER — CLINICAL UPDATE (OUTPATIENT)
Dept: PHARMACY | Facility: CLINIC | Age: 60
End: 2025-07-08
Payer: COMMERCIAL

## 2025-07-08 DIAGNOSIS — E84.9 CYSTIC FIBROSIS (H): Primary | ICD-10-CM

## 2025-07-08 PROCEDURE — 99207 PR NO CHARGE LOS: CPT | Performed by: PHARMACIST

## 2025-07-08 NOTE — PROGRESS NOTES
Clinical Update:                                                    A chart review was conducted for Michele Altamirano.    Reason for Chart Review: Trikafta 6 Month Lab Monitoring     Discussion: Michele has been on Trikafta since 7/20/2020.  Per chart review, patient continues full dose Trikafta .    Labs were reviewed from 7/3/25 at [facility]. All modulator labs are within normal limits.      Plan:  1. Continue Trikafta 2 orange daily (refills on file; mychart previously sent)  2. Recheck hepatic panel in 6 months  with annuals        Liset eCe, PharmD  Cystic Fibrosis MTM Pharmacist  [facility]  Voicemail: [phone]

## 2025-07-13 ENCOUNTER — HEALTH MAINTENANCE LETTER (OUTPATIENT)
Age: 60
End: 2025-07-13

## 2025-07-24 DIAGNOSIS — E84.0 CYSTIC FIBROSIS WITH PULMONARY MANIFESTATIONS (H): ICD-10-CM

## 2025-07-24 RX ORDER — ELEXACAFTOR, TEZACAFTOR, AND IVACAFTOR 100-50-75
KIT ORAL
Qty: 84 TABLET | Refills: 2 | Status: SHIPPED | OUTPATIENT
Start: 2025-07-24

## 2025-07-24 RX ORDER — ELEXACAFTOR, TEZACAFTOR, AND IVACAFTOR 100-50-75
KIT ORAL
Qty: 84 TABLET | Refills: 4 | OUTPATIENT
Start: 2025-07-24 | End: 2025-07-24

## 2025-08-04 ENCOUNTER — MYC MEDICAL ADVICE (OUTPATIENT)
Dept: PULMONOLOGY | Facility: CLINIC | Age: 60
End: 2025-08-04
Payer: COMMERCIAL

## 2025-08-04 DIAGNOSIS — E84.0 CYSTIC FIBROSIS WITH PULMONARY MANIFESTATIONS (H): Primary | ICD-10-CM

## 2025-08-04 RX ORDER — LEVALBUTEROL INHALATION SOLUTION 1.25 MG/3ML
1 SOLUTION RESPIRATORY (INHALATION) EVERY 4 HOURS PRN
Qty: 90 ML | Refills: 0 | Status: SHIPPED | OUTPATIENT
Start: 2025-08-04

## 2025-08-04 RX ORDER — CIPROFLOXACIN 750 MG/1
750 TABLET, FILM COATED ORAL 2 TIMES DAILY
Qty: 20 TABLET | Refills: 0 | Status: SHIPPED | OUTPATIENT
Start: 2025-08-04 | End: 2025-08-14

## 2025-08-14 ENCOUNTER — MYC REFILL (OUTPATIENT)
Dept: ENDOCRINOLOGY | Facility: CLINIC | Age: 60
End: 2025-08-14
Payer: COMMERCIAL

## 2025-08-14 DIAGNOSIS — E10.9 TYPE 1 DIABETES MELLITUS (H): ICD-10-CM

## 2025-08-14 RX ORDER — ACYCLOVIR 400 MG/1
TABLET ORAL
Qty: 10 EACH | Refills: 3 | OUTPATIENT
Start: 2025-08-14